# Patient Record
Sex: MALE | Race: BLACK OR AFRICAN AMERICAN | NOT HISPANIC OR LATINO | Employment: FULL TIME | URBAN - METROPOLITAN AREA
[De-identification: names, ages, dates, MRNs, and addresses within clinical notes are randomized per-mention and may not be internally consistent; named-entity substitution may affect disease eponyms.]

---

## 2018-12-19 ENCOUNTER — OUTSIDE PLACE OF SERVICE (OUTPATIENT)
Dept: CARDIOLOGY | Facility: CLINIC | Age: 48
End: 2018-12-19
Payer: MEDICAID

## 2018-12-19 PROCEDURE — 93306 TTE W/DOPPLER COMPLETE: CPT | Mod: 26,S$PBB,, | Performed by: STUDENT IN AN ORGANIZED HEALTH CARE EDUCATION/TRAINING PROGRAM

## 2020-02-07 ENCOUNTER — CLINICAL SUPPORT (OUTPATIENT)
Dept: CARDIOLOGY | Facility: CLINIC | Age: 50
End: 2020-02-07
Attending: INTERNAL MEDICINE
Payer: MEDICARE

## 2020-02-07 ENCOUNTER — HOSPITAL ENCOUNTER (OUTPATIENT)
Dept: TELEMEDICINE | Facility: HOSPITAL | Age: 50
Discharge: HOME | End: 2020-02-07
Payer: MEDICARE

## 2020-02-07 ENCOUNTER — OUTSIDE PLACE OF SERVICE (OUTPATIENT)
Dept: CARDIOLOGY | Facility: CLINIC | Age: 50
End: 2020-02-07
Payer: MEDICARE

## 2020-02-07 DIAGNOSIS — I63.9 CEREBROVASCULAR ACCIDENT (CVA), UNSPECIFIED MECHANISM: ICD-10-CM

## 2020-02-07 DIAGNOSIS — G45.9 TIA (TRANSIENT ISCHEMIC ATTACK): ICD-10-CM

## 2020-02-07 LAB
AORTIC VALVE CUSP SEPERATION: 2 CM
ASCENDING AORTA: 2.8 CM
AV INDEX (PROSTH): 0.67
AV MEAN GRADIENT: 4 MMHG
AV PEAK GRADIENT: 7 MMHG
AV REGURGITATION PRESSURE HALF TIME: 668 MS
AV VALVE AREA: 2.32 CM2
AV VELOCITY RATIO: 0.69
CV ECHO LV RWT: 0.41 CM
DOP CALC AO PEAK VEL: 1.3 M/S
DOP CALC AO VTI: 28.4 CM
DOP CALC LVOT AREA: 3.5 CM2
DOP CALC LVOT DIAMETER: 2.1 CM
DOP CALC LVOT PEAK VEL: 0.9 M/S
DOP CALC LVOT STROKE VOLUME: 65.78 CM3
DOP CALC MV VTI: 20 CM
DOP CALCLVOT PEAK VEL VTI: 19 CM
E WAVE DECELERATION TIME: 186 MSEC
E/A RATIO: 1.12
E/E' RATIO: 10 M/S
ECHO LV POSTERIOR WALL: 1.2 CM (ref 0.6–1.1)
FRACTIONAL SHORTENING: 22 % (ref 28–44)
INTERVENTRICULAR SEPTUM: 1.6 CM (ref 0.6–1.1)
IVC PROX: 1.5 CM
IVRT: 166 MSEC
LEFT INTERNAL DIMENSION IN SYSTOLE: 4.6 CM (ref 2.1–4)
LEFT VENTRICULAR INTERNAL DIMENSION IN DIASTOLE: 5.9 CM (ref 3.5–6)
LEFT VENTRICULAR MASS: 377.6 G
LV LATERAL E/E' RATIO: 11 M/S
LV SEPTAL E/E' RATIO: 9.17 M/S
MV A" WAVE DURATION": 125 MSEC
MV MEAN GRADIENT: 0 MMHG
MV PEAK A VEL: 0.49 M/S
MV PEAK E VEL: 0.55 M/S
MV PEAK GRADIENT: 1 MMHG
MV STENOSIS PRESSURE HALF TIME: 60 MS
MV VALVE AREA BY CONTINUITY EQUATION: 3.29 CM2
MV VALVE AREA P 1/2 METHOD: 3.67 CM2
PISA TR MAX VEL: 2.72 M/S
PROX AORTA: 2.3 CM
PULM VEIN A" WAVE DURATION": 129 MSEC
PULM VEIN S/D RATIO: 1.39
PV PEAK D VEL: 0.41 M/S
PV PEAK S VEL: 0.57 M/S
PV PEAK VELOCITY: 0.73 CM/S
RA PRESSURE: 3 MMHG
RIGHT VENTRICULAR END-DIASTOLIC DIMENSION: 2.9 CM
SINUS: 3.4 CM
STJ: 2.8 CM
TDI LATERAL: 0.05 M/S
TDI SEPTAL: 0.06 M/S
TDI: 0.06 M/S
TR MAX PG: 30 MMHG
TRICUSPID ANNULAR PLANE SYSTOLIC EXCURSION: 2.3 CM
TV REST PULMONARY ARTERY PRESSURE: 33 MMHG

## 2020-02-07 PROCEDURE — G0425 INPT/ED TELECONSULT30: HCPCS | Mod: GT,G0,, | Performed by: PSYCHIATRY & NEUROLOGY

## 2020-02-07 PROCEDURE — 93321 PR DOPPLER ECHO HEART,LIMITED,F/U: ICD-10-PCS | Mod: 26,,, | Performed by: INTERNAL MEDICINE

## 2020-02-07 PROCEDURE — G0425 PR INPT TELEHEALTH CONSULT 30M: ICD-10-PCS | Mod: GT,G0,, | Performed by: PSYCHIATRY & NEUROLOGY

## 2020-02-07 PROCEDURE — 93325 PR DOPPLER COLOR FLOW VELOCITY MAP: ICD-10-PCS | Mod: 26,,, | Performed by: INTERNAL MEDICINE

## 2020-02-07 PROCEDURE — 93308 ECHO (CUPID ONLY): ICD-10-PCS | Mod: 26,,, | Performed by: INTERNAL MEDICINE

## 2020-02-07 PROCEDURE — 93010 PR ELECTROCARDIOGRAM REPORT: ICD-10-PCS | Mod: S$GLB,,, | Performed by: STUDENT IN AN ORGANIZED HEALTH CARE EDUCATION/TRAINING PROGRAM

## 2020-02-07 PROCEDURE — 93325 DOPPLER ECHO COLOR FLOW MAPG: CPT | Mod: 26,,, | Performed by: INTERNAL MEDICINE

## 2020-02-07 PROCEDURE — 93308 TTE F-UP OR LMTD: CPT | Mod: 26,,, | Performed by: INTERNAL MEDICINE

## 2020-02-07 PROCEDURE — 93321 DOPPLER ECHO F-UP/LMTD STD: CPT | Mod: 26,,, | Performed by: INTERNAL MEDICINE

## 2020-02-07 PROCEDURE — 93010 ELECTROCARDIOGRAM REPORT: CPT | Mod: S$GLB,,, | Performed by: STUDENT IN AN ORGANIZED HEALTH CARE EDUCATION/TRAINING PROGRAM

## 2020-02-07 NOTE — CONSULTS
[facility]  Vascular Neurology  Comprehensive Stroke Center  Tele-Consultation Note      Consults    Consulting Provider: HELIO COE  Current Providers  No providers found    Patient Location: [facility] - TELEMEDIC[facility] Emergency Department  Spoke hospital nurse at bedside with patient assisting consultant.     Patient information was obtained from patient and ED nurse.         Assessment/Plan:   48 y/o with HTN, DM, presents with acute onset L sided weakness-numbness and slurred speech noticed upon awakening. Had similar symptoms yesterday which lasted 1 hour and resolved.  NIHSS 6, CTH without acute abnormality.  Suspect acute R brain infarct. No iv alteplase due to woke up stroke.  Recommend STAT CTA brain and neck searching for LVO and eligibility for EVT.  Transfer to main campus if CTA confirms LVO. Otherwise, patient can stay at the spoken facility or transfer to the nearest appropriate facility for completion of stroke work up.           STROKE DOCUMENTATION     Acute Stroke Times:   Acute Stroke Times   Last Known Normal Date: 02/07/20  Last Known Normal Time: 1900  Symptom Onset Date: (unclear, woke up with symptoms)  Symptom Onset Time: (unclear, woke up stroke)  Stroke Team Called Date: 02/07/20  Stroke Team Called Time: 0705  Stroke Team Arrival Date: 02/07/20  Stroke Team Arrival Time: 0710  CT Interpretation Time: 0710  Decision to Treat Time for Alteplase: (No iv alteplase)  Decision to Treat Time for IR: (Pending cta brain)    NIH Scale:  Interval: baseline  1a. Level of Consciousness: 0-->Alert, keenly responsive  1b. LOC Questions: 0-->Answers both questions correctly  1c. LOC Commands: 0-->Performs both tasks correctly  2. Best Gaze: 0-->Normal  3. Visual: 0-->No visual loss  4. Facial Palsy: 0-->Normal symmetrical movements  5a. Motor Arm, Left: 1-->Drift, limb holds 90 (or 45) degrees, but drifts down before full  10 seconds, does not hit bed or other support  5b. Motor Arm, Right: 0-->No drift, limb holds 90 (or 45) degrees for full 10 secs  6a. Motor Leg, Left: 2-->Some effort against gravity, leg falls to bed by 5 secs, but has some effort against gravity  6b. Motor Leg, Right: 0-->No drift, leg holds 30 degree position for full 5 secs  7. Limb Ataxia: 0-->Absent  8. Sensory: 1-->Mild-to-moderate sensory loss, patient feels pinprick is less sharp or is dull on the affected side, or there is a loss of superficial pain with pinprick, but patient is aware of being touched  9. Best Language: 0-->No aphasia, normal  10. Dysarthria: 2-->Severe dysarthria, patients speech is so slurred as to be unintelligible in the absence of or out of proportion to any dysphasia, or is mute/anarthric  11. Extinction and Inattention (formerly Neglect): 0-->No abnormality  Total (NIH Stroke Scale): 6     Modified Rios Score: 0  Nalini Coma Scale:15   ABCD2 Score:    [id number] Score:   HAS -BLED Score:   ICH Score:   Hunt & Eaton Classification:       Diagnoses: L sided weakness. Slurred speech.  No new Assessment & Plan notes have been filed under this hospital service since the last note was generated.  Service: Vascular Neurology      There were no vitals taken for this visit.  Alteplase Eligible?: No  Alteplase Recommendation: Alteplase not recommended due to Outside of treatment window   Possible Interventional Revascularization Candidate? pending CTA brain and neck    Disposition Recommendation: pending further studies    Subjective:     History of Present Illness: 50 y/o with HTN, DM, presents with acute onset L sided weakness-numbness and slurred speech noticed upon awakening. Had similar symptoms yesterday which lasted 1 hour and resolved.  No improving.         No notes on file      Woke up with symptoms?: yes    Recent bleeding noted: no  Does the patient take any Blood Thinners? no  Medications: Antiplatelets:  none      Past  Medical History: hypertension and diabetes    Past Surgical History: no major surgeries within the last 2 weeks    Family History: no relevant history    Social History: no smoking, no drinking, no drugs    Allergies: No Known Allergies No known drug allergies    Review of Systems   Constitutional: Negative for chills, diaphoresis and fever.   HENT: Negative for hearing loss, trouble swallowing and voice change.    Eyes: Negative for visual disturbance.   Respiratory: Negative for shortness of breath.    Cardiovascular: Negative for chest pain and palpitations.   Gastrointestinal: Negative for vomiting.   Endocrine: Negative for cold intolerance.   Genitourinary: Negative for hematuria.   Musculoskeletal: Negative for gait problem, neck pain and neck stiffness.   Skin: Negative for rash.   Allergic/Immunologic: Negative for immunocompromised state.   Neurological: Positive for speech difficulty, weakness and numbness. Negative for dizziness, facial asymmetry and headaches.   Hematological: Does not bruise/bleed easily.   Psychiatric/Behavioral: Negative for agitation, behavioral problems and confusion.     Objective:   Vitals: There were no vitals taken for this visit. BP: 152/93, Respiratory Rate: 17 and Heart Rate: 78    CT READ: Yes  No hemmorhage. No mass effect. No early infarct signs.     Physical Exam   Constitutional: He is oriented to person, place, and time. He appears well-developed and well-nourished.   HENT:   Head: Normocephalic and atraumatic.   Eyes: Pupils are equal, round, and reactive to light. EOM are normal.   Neck: Normal range of motion. Neck supple.   Cardiovascular: Normal rate and regular rhythm.   Pulmonary/Chest: No respiratory distress.   Abdominal: He exhibits no mass.   Genitourinary:   Genitourinary Comments: No performed   Musculoskeletal: He exhibits no edema or deformity.   Neurological: He is alert and oriented to person, place, and time. A sensory deficit is present. No cranial  nerve deficit. Coordination normal.   Skin: No rash noted. He is not diaphoretic. No erythema.   Psychiatric: He has a normal mood and affect. His behavior is normal.   Nursing note and vitals reviewed.            Recommended the emergency room physician to have a brief discussion with the patient and/or family if available regarding the risks and benefits of treatment, and to briefly document the occurrence of that discussion in his clinical encounter note.     The attending portion of this evaluation, treatment, and documentation was performed per Dario Jordan MD via audiovisual.    Billing code:  (moderate to severe stroke, large areas of edema, some mimics)    · This patient has a critical neurological condition/illness, with high morbidity and mortality.  · There is a high probability for acute neurological change leading to clinical and possibly life-threatening deterioration requiring highest level of physician preparedness for urgent intervention.  · Care was coordinated with other physicians involved in the patient's care.  · Radiologic studies and laboratory data were reviewed and interpreted, and plan of care was re-assessed based on the results.  · Diagnosis, treatment options and prognosis may have been discussed with the patient and/or family members or caregiver.  · Further advanced medical management and further evaluation is warranted for his care.      In your opinion, this was a: Tier 1 Van Negative    Consult End Time: 717 am    Dario Jordan MD  Comprehensive Stroke Center  Vascular Neurology   [facility]

## 2020-02-07 NOTE — SUBJECTIVE & OBJECTIVE
Woke up with symptoms?: yes    Recent bleeding noted: no  Does the patient take any Blood Thinners? no  Medications: Antiplatelets:  none      Past Medical History: hypertension and diabetes    Past Surgical History: no major surgeries within the last 2 weeks    Family History: no relevant history    Social History: no smoking, no drinking, no drugs    Allergies: No Known Allergies No known drug allergies    Review of Systems   Constitutional: Negative for chills, diaphoresis and fever.   HENT: Negative for hearing loss, trouble swallowing and voice change.    Eyes: Negative for visual disturbance.   Respiratory: Negative for shortness of breath.    Cardiovascular: Negative for chest pain and palpitations.   Gastrointestinal: Negative for vomiting.   Endocrine: Negative for cold intolerance.   Genitourinary: Negative for hematuria.   Musculoskeletal: Negative for gait problem, neck pain and neck stiffness.   Skin: Negative for rash.   Allergic/Immunologic: Negative for immunocompromised state.   Neurological: Positive for speech difficulty, weakness and numbness. Negative for dizziness, facial asymmetry and headaches.   Hematological: Does not bruise/bleed easily.   Psychiatric/Behavioral: Negative for agitation, behavioral problems and confusion.     Objective:   Vitals: There were no vitals taken for this visit. BP: 152/93, Respiratory Rate: 17 and Heart Rate: 78    CT READ: Yes  No hemmorhage. No mass effect. No early infarct signs.     Physical Exam   Constitutional: He is oriented to person, place, and time. He appears well-developed and well-nourished.   HENT:   Head: Normocephalic and atraumatic.   Eyes: Pupils are equal, round, and reactive to light. EOM are normal.   Neck: Normal range of motion. Neck supple.   Cardiovascular: Normal rate and regular rhythm.   Pulmonary/Chest: No respiratory distress.   Abdominal: He exhibits no mass.   Genitourinary:   Genitourinary Comments: No performed    Musculoskeletal: He exhibits no edema or deformity.   Neurological: He is alert and oriented to person, place, and time. A sensory deficit is present. No cranial nerve deficit. Coordination normal.   Skin: No rash noted. He is not diaphoretic. No erythema.   Psychiatric: He has a normal mood and affect. His behavior is normal.   Nursing note and vitals reviewed.

## 2020-03-11 ENCOUNTER — OUTSIDE PLACE OF SERVICE (OUTPATIENT)
Dept: CARDIOLOGY | Facility: CLINIC | Age: 50
End: 2020-03-11
Payer: MEDICARE

## 2020-03-11 PROCEDURE — 93010 ELECTROCARDIOGRAM REPORT: CPT | Mod: ,,, | Performed by: INTERNAL MEDICINE

## 2020-03-11 PROCEDURE — 93010 PR ELECTROCARDIOGRAM REPORT: ICD-10-PCS | Mod: ,,, | Performed by: INTERNAL MEDICINE

## 2020-08-06 ENCOUNTER — OUTSIDE PLACE OF SERVICE (OUTPATIENT)
Dept: CARDIOLOGY | Facility: CLINIC | Age: 50
End: 2020-08-06
Payer: MEDICARE

## 2020-08-06 PROCEDURE — 93010 ELECTROCARDIOGRAM REPORT: CPT | Mod: ,,, | Performed by: INTERNAL MEDICINE

## 2020-08-06 PROCEDURE — 93010 PR ELECTROCARDIOGRAM REPORT: ICD-10-PCS | Mod: ,,, | Performed by: INTERNAL MEDICINE

## 2022-09-12 ENCOUNTER — CLINICAL SUPPORT (OUTPATIENT)
Dept: CARDIOLOGY | Facility: CLINIC | Age: 52
End: 2022-09-12
Attending: INTERNAL MEDICINE
Payer: MEDICARE

## 2022-09-12 ENCOUNTER — DOCUMENTATION ONLY (OUTPATIENT)
Dept: HEPATOLOGY | Facility: HOSPITAL | Age: 52
End: 2022-09-12
Payer: MEDICARE

## 2022-09-12 ENCOUNTER — OUTSIDE PLACE OF SERVICE (OUTPATIENT)
Dept: CARDIOLOGY | Facility: CLINIC | Age: 52
End: 2022-09-12
Payer: MEDICARE

## 2022-09-12 VITALS — WEIGHT: 212.06 LBS | BODY MASS INDEX: 27.22 KG/M2 | HEIGHT: 74 IN

## 2022-09-12 DIAGNOSIS — D72.829 LEUKOCYTOSIS, UNSPECIFIED TYPE: ICD-10-CM

## 2022-09-12 LAB
AORTIC ROOT ANNULUS: 2.77 CM
AORTIC VALVE CUSP SEPERATION: 2 CM
ASCENDING AORTA: 2.83 CM
AV INDEX (PROSTH): 0.65
AV MEAN GRADIENT: 5 MMHG
AV PEAK GRADIENT: 9 MMHG
AV VALVE AREA: 3.02 CM2
AV VELOCITY RATIO: 0.73
BSA FOR ECHO PROCEDURE: 2.24 M2
CV ECHO LV RWT: 0.37 CM
DOP CALC AO PEAK VEL: 1.46 M/S
DOP CALC AO VTI: 26.9 CM
DOP CALC LVOT AREA: 4.7 CM2
DOP CALC LVOT DIAMETER: 2.44 CM
DOP CALC LVOT PEAK VEL: 1.07 M/S
DOP CALC LVOT STROKE VOLUME: 81.32 CM3
DOP CALC MV VTI: 14.9 CM
DOP CALCLVOT PEAK VEL VTI: 17.4 CM
E WAVE DECELERATION TIME: 171.18 MSEC
E/A RATIO: 1.04
E/E' RATIO: 12 M/S
ECHO LV POSTERIOR WALL: 1 CM (ref 0.6–1.1)
EJECTION FRACTION: 65 %
FRACTIONAL SHORTENING: 30 % (ref 28–44)
INTERVENTRICULAR SEPTUM: 1.45 CM (ref 0.6–1.1)
IVC DIAMETER: 1.13 CM
IVRT: 134.95 MSEC
LA MAJOR: 5.79 CM
LA MINOR: 4.47 CM
LEFT INTERNAL DIMENSION IN SYSTOLE: 3.77 CM (ref 2.1–4)
LEFT VENTRICLE DIASTOLIC VOLUME INDEX: 63.95 ML/M2
LEFT VENTRICLE DIASTOLIC VOLUME: 142.61 ML
LEFT VENTRICLE MASS INDEX: 123 G/M2
LEFT VENTRICLE SYSTOLIC VOLUME INDEX: 27.2 ML/M2
LEFT VENTRICLE SYSTOLIC VOLUME: 60.65 ML
LEFT VENTRICULAR INTERNAL DIMENSION IN DIASTOLE: 5.42 CM (ref 3.5–6)
LEFT VENTRICULAR MASS: 273.68 G
LV LATERAL E/E' RATIO: 12 M/S
LV SEPTAL E/E' RATIO: 12 M/S
LVOT MG: 2.07 MMHG
LVOT MV: 0.63 CM/S
MV A" WAVE DURATION": 114.19 MSEC
MV MEAN GRADIENT: 1 MMHG
MV PEAK A VEL: 0.69 M/S
MV PEAK E VEL: 0.72 M/S
MV PEAK GRADIENT: 2 MMHG
MV VALVE AREA BY CONTINUITY EQUATION: 5.46 CM2
PULM VEIN S/D RATIO: 1.61
PV PEAK D VEL: 0.46 M/S
PV PEAK S VEL: 0.74 M/S
PV PEAK VELOCITY: 1.04 CM/S
RA MAJOR: 4.83 CM
RA PRESSURE: 3 MMHG
RIGHT VENTRICULAR END-DIASTOLIC DIMENSION: 3.1 CM
RV TISSUE DOPPLER FREE WALL SYSTOLIC VELOCITY 1 (APICAL 4 CHAMBER VIEW): 0.02 CM/S
TDI LATERAL: 0.06 M/S
TDI SEPTAL: 0.06 M/S
TDI: 0.06 M/S
TV PEAK GRADIENT: 1.54 MMHG

## 2022-09-12 PROCEDURE — 93306 ECHO (CUPID ONLY): ICD-10-PCS | Mod: 26,,, | Performed by: INTERNAL MEDICINE

## 2022-09-12 PROCEDURE — 93306 TTE W/DOPPLER COMPLETE: CPT | Mod: 26,,, | Performed by: INTERNAL MEDICINE

## 2022-09-13 NOTE — PROGRESS NOTES
Outside Transfer Acceptance Note / Regional Referral Center    Referring facility: [facility]   Referring provider: TANA TAVERA  Accepting facility: [facility]  Accepting provider: Ekaterina Sellers MD  Admitting provider: Ekaterina Sellers MD  Reason for transfer:  Diabetic foot ulcer with abscess  Transfer diagnosis: Diabetic foot ulcer with abscess  Transfer specialty requested: Podiatry, ID  Transfer specialty notified: yes  Transfer level: NUMBER 1-5: 2  Bed type requested: Med Surg  Admission class or status: IP- Inpatient      Narrative     Transfer Diagnosis:  Diabetic foot ulcer with abscess  Reason for Transfer:  Diabetic foot ulcer with abscess  Consultants:  Podiatry, ID  Transferring Facility:  [facility]  Transferring Destination:     Mr. Henry Fernandez is a 52 y.o. male with poorly controlled T2DM, diabetic gastroparesis, history of CVA with left sided hemiparesis, and a known right diabetic foot ulcer (follows with wound care), who [facility] [place] [facility] for evaluation of nausea, vomiting, and diarrhea for 3 days.  He was initially admitted for diabetic gastroparesis.  Initial workup showed an elevated Lactic 3 that improved with IVF.  Throat Strep screen was positive and he was given IM Penicillin.  Wound care was consulted for his diabetic foot ulcer, who were concerned about an infection.  He was started on Vanc and Ceftriaxone, and then changed to Zosyn as his WBC increased from 17 to 21.  ESR >140,  Blood cultures returned positive for Staph schleiferi.  MRI of the RLE showed an 8.4cm dorsal fluid collection, with a possibilty of osteo.  Transfer is requested for a HLOC, for Podiatry and ID evaluation.    Consult Podiatry  Consult ID  Repeat cultures      Instructions      [facility]  Admit to Hospital Medicine  Upon patient arrival to floor, please contact Hospital Medicine on call.     Ekaterina Sellers MD, [facility]  Senior  Physician  [facility] Medicine

## 2022-09-14 ENCOUNTER — HOSPITAL ENCOUNTER (INPATIENT)
Facility: HOSPITAL | Age: 52
LOS: 9 days | Discharge: NURSING, REHAB OR LONG-TERM CARE | DRG: 623 | End: 2022-09-23
Attending: EMERGENCY MEDICINE | Admitting: INTERNAL MEDICINE
Payer: MEDICARE

## 2022-09-14 DIAGNOSIS — E11.621 DIABETIC FOOT ULCER: ICD-10-CM

## 2022-09-14 DIAGNOSIS — L97.509 DIABETIC FOOT ULCER: ICD-10-CM

## 2022-09-14 DIAGNOSIS — E11.621 DIABETIC ULCER OF RIGHT MIDFOOT ASSOCIATED WITH TYPE 2 DIABETES MELLITUS, WITH NECROSIS OF MUSCLE: ICD-10-CM

## 2022-09-14 DIAGNOSIS — L97.413 DIABETIC ULCER OF RIGHT MIDFOOT ASSOCIATED WITH TYPE 2 DIABETES MELLITUS, WITH NECROSIS OF MUSCLE: ICD-10-CM

## 2022-09-14 DIAGNOSIS — E11.621 DIABETIC ULCER OF RIGHT FOOT ASSOCIATED WITH TYPE 2 DIABETES MELLITUS, WITH BONE INVOLVEMENT WITHOUT EVIDENCE OF NECROSIS, UNSPECIFIED PART OF FOOT: Primary | ICD-10-CM

## 2022-09-14 DIAGNOSIS — L97.519 DIABETIC ULCER OF OTHER PART OF RIGHT FOOT ASSOCIATED WITH DIABETES MELLITUS DUE TO UNDERLYING CONDITION, UNSPECIFIED ULCER STAGE: ICD-10-CM

## 2022-09-14 DIAGNOSIS — M14.671 CHARCOT'S JOINT OF FOOT, RIGHT: ICD-10-CM

## 2022-09-14 DIAGNOSIS — E08.621 DIABETIC ULCER OF OTHER PART OF RIGHT FOOT ASSOCIATED WITH DIABETES MELLITUS DUE TO UNDERLYING CONDITION, UNSPECIFIED ULCER STAGE: ICD-10-CM

## 2022-09-14 DIAGNOSIS — L97.516 DIABETIC ULCER OF RIGHT FOOT ASSOCIATED WITH TYPE 2 DIABETES MELLITUS, WITH BONE INVOLVEMENT WITHOUT EVIDENCE OF NECROSIS, UNSPECIFIED PART OF FOOT: Primary | ICD-10-CM

## 2022-09-14 DIAGNOSIS — L02.611 ABSCESS OF FOOT WITHOUT TOES, RIGHT: ICD-10-CM

## 2022-09-14 DIAGNOSIS — R07.9 CHEST PAIN: ICD-10-CM

## 2022-09-14 PROBLEM — Z86.73 HISTORY OF STROKE: Status: ACTIVE | Noted: 2022-09-14

## 2022-09-14 PROBLEM — R78.81 BACTEREMIA: Status: ACTIVE | Noted: 2022-09-14

## 2022-09-14 LAB
ALBUMIN SERPL BCP-MCNC: 1.9 G/DL (ref 3.5–5.2)
ALP SERPL-CCNC: 98 U/L (ref 55–135)
ALT SERPL W/O P-5'-P-CCNC: 18 U/L (ref 10–44)
ANION GAP SERPL CALC-SCNC: 12 MMOL/L (ref 8–16)
APTT BLDCRRT: 27.9 SEC (ref 21–32)
AST SERPL-CCNC: 22 U/L (ref 10–40)
BASOPHILS NFR BLD: 0 % (ref 0–1.9)
BILIRUB SERPL-MCNC: 0.8 MG/DL (ref 0.1–1)
BUN SERPL-MCNC: 16 MG/DL (ref 6–20)
CALCIUM SERPL-MCNC: 8.3 MG/DL (ref 8.7–10.5)
CHLORIDE SERPL-SCNC: 100 MMOL/L (ref 95–110)
CO2 SERPL-SCNC: 21 MMOL/L (ref 23–29)
CREAT SERPL-MCNC: 1.7 MG/DL (ref 0.5–1.4)
CRP SERPL-MCNC: 204.6 MG/L (ref 0–8.2)
DIFFERENTIAL METHOD: ABNORMAL
EOSINOPHIL NFR BLD: 3 % (ref 0–8)
ERYTHROCYTE [DISTWIDTH] IN BLOOD BY AUTOMATED COUNT: 13.1 % (ref 11.5–14.5)
ERYTHROCYTE [SEDIMENTATION RATE] IN BLOOD BY WESTERGREN METHOD: 120 MM/HR (ref 0–10)
EST. GFR  (NO RACE VARIABLE): 48 ML/MIN/1.73 M^2
GLUCOSE SERPL-MCNC: 256 MG/DL (ref 70–110)
HCT VFR BLD AUTO: 32.5 % (ref 40–54)
HGB BLD-MCNC: 10.6 G/DL (ref 14–18)
IMM GRANULOCYTES # BLD AUTO: ABNORMAL K/UL (ref 0–0.04)
IMM GRANULOCYTES NFR BLD AUTO: ABNORMAL % (ref 0–0.5)
INR PPP: 1 (ref 0.8–1.2)
LACTATE SERPL-SCNC: 1.2 MMOL/L (ref 0.5–2.2)
LYMPHOCYTES NFR BLD: 9 % (ref 18–48)
MCH RBC QN AUTO: 29.1 PG (ref 27–31)
MCHC RBC AUTO-ENTMCNC: 32.6 G/DL (ref 32–36)
MCV RBC AUTO: 89 FL (ref 82–98)
MONOCYTES NFR BLD: 12 % (ref 4–15)
NEUTROPHILS NFR BLD: 76 % (ref 38–73)
NRBC BLD-RTO: 0 /100 WBC
PLATELET # BLD AUTO: 429 K/UL (ref 150–450)
PLATELET BLD QL SMEAR: ABNORMAL
PMV BLD AUTO: 8.7 FL (ref 9.2–12.9)
POCT GLUCOSE: 243 MG/DL (ref 70–110)
POCT GLUCOSE: 303 MG/DL (ref 70–110)
POTASSIUM SERPL-SCNC: 3.7 MMOL/L (ref 3.5–5.1)
PROCALCITONIN SERPL IA-MCNC: 0.65 NG/ML
PROT SERPL-MCNC: 6.9 G/DL (ref 6–8.4)
PROTHROMBIN TIME: 10.8 SEC (ref 9–12.5)
RBC # BLD AUTO: 3.64 M/UL (ref 4.6–6.2)
SODIUM SERPL-SCNC: 133 MMOL/L (ref 136–145)
WBC # BLD AUTO: 21.51 K/UL (ref 3.9–12.7)

## 2022-09-14 PROCEDURE — 10060 I&D ABSCESS SIMPLE/SINGLE: CPT | Mod: ,,, | Performed by: PODIATRIST

## 2022-09-14 PROCEDURE — 36415 COLL VENOUS BLD VENIPUNCTURE: CPT | Performed by: NURSE PRACTITIONER

## 2022-09-14 PROCEDURE — 86140 C-REACTIVE PROTEIN: CPT | Performed by: NURSE PRACTITIONER

## 2022-09-14 PROCEDURE — 85730 THROMBOPLASTIN TIME PARTIAL: CPT | Performed by: NURSE PRACTITIONER

## 2022-09-14 PROCEDURE — 21400001 HC TELEMETRY ROOM

## 2022-09-14 PROCEDURE — 83036 HEMOGLOBIN GLYCOSYLATED A1C: CPT | Performed by: NURSE PRACTITIONER

## 2022-09-14 PROCEDURE — 83605 ASSAY OF LACTIC ACID: CPT | Performed by: NURSE PRACTITIONER

## 2022-09-14 PROCEDURE — 63600175 PHARM REV CODE 636 W HCPCS: Performed by: NURSE PRACTITIONER

## 2022-09-14 PROCEDURE — 84145 PROCALCITONIN (PCT): CPT | Performed by: NURSE PRACTITIONER

## 2022-09-14 PROCEDURE — 10060 INCISION AND DRAINAGE: ICD-10-PCS | Mod: ,,, | Performed by: PODIATRIST

## 2022-09-14 PROCEDURE — 87081 CULTURE SCREEN ONLY: CPT | Performed by: INTERNAL MEDICINE

## 2022-09-14 PROCEDURE — 87040 BLOOD CULTURE FOR BACTERIA: CPT | Mod: 59 | Performed by: INTERNAL MEDICINE

## 2022-09-14 PROCEDURE — 99223 PR INITIAL HOSPITAL CARE,LEVL III: ICD-10-PCS | Mod: 25,,, | Performed by: PODIATRIST

## 2022-09-14 PROCEDURE — 99223 1ST HOSP IP/OBS HIGH 75: CPT | Mod: 25,,, | Performed by: PODIATRIST

## 2022-09-14 PROCEDURE — 25000003 PHARM REV CODE 250: Performed by: NURSE PRACTITIONER

## 2022-09-14 PROCEDURE — 25000003 PHARM REV CODE 250: Performed by: INTERNAL MEDICINE

## 2022-09-14 PROCEDURE — 80053 COMPREHEN METABOLIC PANEL: CPT | Performed by: NURSE PRACTITIONER

## 2022-09-14 PROCEDURE — 85027 COMPLETE CBC AUTOMATED: CPT | Performed by: NURSE PRACTITIONER

## 2022-09-14 PROCEDURE — 85007 BL SMEAR W/DIFF WBC COUNT: CPT | Performed by: NURSE PRACTITIONER

## 2022-09-14 PROCEDURE — 85651 RBC SED RATE NONAUTOMATED: CPT | Performed by: NURSE PRACTITIONER

## 2022-09-14 PROCEDURE — 63600175 PHARM REV CODE 636 W HCPCS: Performed by: INTERNAL MEDICINE

## 2022-09-14 PROCEDURE — 85610 PROTHROMBIN TIME: CPT | Performed by: NURSE PRACTITIONER

## 2022-09-14 PROCEDURE — 36415 COLL VENOUS BLD VENIPUNCTURE: CPT | Performed by: INTERNAL MEDICINE

## 2022-09-14 RX ORDER — IBUPROFEN 200 MG
24 TABLET ORAL
Status: DISCONTINUED | OUTPATIENT
Start: 2022-09-14 | End: 2022-09-23 | Stop reason: HOSPADM

## 2022-09-14 RX ORDER — AMLODIPINE BESYLATE 10 MG/1
10 TABLET ORAL DAILY
COMMUNITY
Start: 2022-06-08

## 2022-09-14 RX ORDER — ACETAMINOPHEN 325 MG/1
650 TABLET ORAL EVERY 4 HOURS PRN
Status: DISCONTINUED | OUTPATIENT
Start: 2022-09-14 | End: 2022-09-23 | Stop reason: HOSPADM

## 2022-09-14 RX ORDER — INSULIN ASPART 100 [IU]/ML
25 INJECTION, SOLUTION INTRAVENOUS; SUBCUTANEOUS
Status: ON HOLD | COMMUNITY
End: 2022-09-23 | Stop reason: HOSPADM

## 2022-09-14 RX ORDER — MAG HYDROX/ALUMINUM HYD/SIMETH 200-200-20
30 SUSPENSION, ORAL (FINAL DOSE FORM) ORAL 4 TIMES DAILY PRN
Status: DISCONTINUED | OUTPATIENT
Start: 2022-09-14 | End: 2022-09-23 | Stop reason: HOSPADM

## 2022-09-14 RX ORDER — SIMETHICONE 80 MG
1 TABLET,CHEWABLE ORAL 4 TIMES DAILY PRN
Status: DISCONTINUED | OUTPATIENT
Start: 2022-09-14 | End: 2022-09-23 | Stop reason: HOSPADM

## 2022-09-14 RX ORDER — POLYETHYLENE GLYCOL 3350 17 G/17G
17 POWDER, FOR SOLUTION ORAL DAILY PRN
Status: DISCONTINUED | OUTPATIENT
Start: 2022-09-14 | End: 2022-09-23 | Stop reason: HOSPADM

## 2022-09-14 RX ORDER — ATORVASTATIN CALCIUM 20 MG/1
TABLET, FILM COATED ORAL
COMMUNITY

## 2022-09-14 RX ORDER — NALOXONE HCL 0.4 MG/ML
0.02 VIAL (ML) INJECTION
Status: DISCONTINUED | OUTPATIENT
Start: 2022-09-14 | End: 2022-09-23 | Stop reason: HOSPADM

## 2022-09-14 RX ORDER — AMLODIPINE BESYLATE 10 MG/1
10 TABLET ORAL DAILY
Status: DISCONTINUED | OUTPATIENT
Start: 2022-09-15 | End: 2022-09-14

## 2022-09-14 RX ORDER — GLUCAGON 1 MG
1 KIT INJECTION
Status: DISCONTINUED | OUTPATIENT
Start: 2022-09-14 | End: 2022-09-23 | Stop reason: HOSPADM

## 2022-09-14 RX ORDER — CLOPIDOGREL BISULFATE 75 MG/1
TABLET ORAL
COMMUNITY

## 2022-09-14 RX ORDER — IBUPROFEN 200 MG
16 TABLET ORAL
Status: DISCONTINUED | OUTPATIENT
Start: 2022-09-14 | End: 2022-09-23 | Stop reason: HOSPADM

## 2022-09-14 RX ORDER — ATORVASTATIN CALCIUM 10 MG/1
20 TABLET, FILM COATED ORAL DAILY
Status: DISCONTINUED | OUTPATIENT
Start: 2022-09-15 | End: 2022-09-23 | Stop reason: HOSPADM

## 2022-09-14 RX ORDER — IPRATROPIUM BROMIDE AND ALBUTEROL SULFATE 2.5; .5 MG/3ML; MG/3ML
3 SOLUTION RESPIRATORY (INHALATION) EVERY 6 HOURS PRN
Status: DISCONTINUED | OUTPATIENT
Start: 2022-09-14 | End: 2022-09-23 | Stop reason: HOSPADM

## 2022-09-14 RX ORDER — INSULIN LISPRO 100 [IU]/ML
25 INJECTION, SOLUTION INTRAVENOUS; SUBCUTANEOUS
Status: ON HOLD | COMMUNITY
End: 2022-09-23 | Stop reason: HOSPADM

## 2022-09-14 RX ORDER — SODIUM CHLORIDE 0.9 % (FLUSH) 0.9 %
10 SYRINGE (ML) INJECTION EVERY 8 HOURS PRN
Status: DISCONTINUED | OUTPATIENT
Start: 2022-09-14 | End: 2022-09-23 | Stop reason: HOSPADM

## 2022-09-14 RX ORDER — ONDANSETRON 2 MG/ML
4 INJECTION INTRAMUSCULAR; INTRAVENOUS EVERY 8 HOURS PRN
Status: DISCONTINUED | OUTPATIENT
Start: 2022-09-14 | End: 2022-09-23 | Stop reason: HOSPADM

## 2022-09-14 RX ORDER — INSULIN ASPART 100 [IU]/ML
1-10 INJECTION, SOLUTION INTRAVENOUS; SUBCUTANEOUS
Status: DISCONTINUED | OUTPATIENT
Start: 2022-09-14 | End: 2022-09-23 | Stop reason: HOSPADM

## 2022-09-14 RX ORDER — AMLODIPINE BESYLATE 10 MG/1
10 TABLET ORAL DAILY
Status: DISCONTINUED | OUTPATIENT
Start: 2022-09-14 | End: 2022-09-23 | Stop reason: HOSPADM

## 2022-09-14 RX ORDER — TALC
6 POWDER (GRAM) TOPICAL NIGHTLY PRN
Status: DISCONTINUED | OUTPATIENT
Start: 2022-09-14 | End: 2022-09-23 | Stop reason: HOSPADM

## 2022-09-14 RX ADMIN — INSULIN DETEMIR 25 UNITS: 100 INJECTION, SOLUTION SUBCUTANEOUS at 09:09

## 2022-09-14 RX ADMIN — INSULIN ASPART 4 UNITS: 100 INJECTION, SOLUTION INTRAVENOUS; SUBCUTANEOUS at 09:09

## 2022-09-14 RX ADMIN — AMLODIPINE BESYLATE 10 MG: 10 TABLET ORAL at 05:09

## 2022-09-14 RX ADMIN — INSULIN ASPART 4 UNITS: 100 INJECTION, SOLUTION INTRAVENOUS; SUBCUTANEOUS at 05:09

## 2022-09-14 RX ADMIN — CEFTRIAXONE 2 G: 2 INJECTION, SOLUTION INTRAVENOUS at 05:09

## 2022-09-14 RX ADMIN — VANCOMYCIN HYDROCHLORIDE 2000 MG: 10 INJECTION, POWDER, LYOPHILIZED, FOR SOLUTION INTRAVENOUS at 06:09

## 2022-09-14 NOTE — H&P
[facility] Tele[facility] ([facility])  [facility] Medicine  History & Physical    Patient Name: Henry Fernandez  MRN: [id number]  Patient Class: IP- Inpatient  Admission Date: 9/14/2022  Attending Physician: Wyatt العراقي MD   Primary Care Provider: No Maher MD         Patient information was obtained from patient and ER records.     Subjective:     Principal Problem:Diabetic ulcer of right foot    Chief Complaint:      HPI: Mr Fernandez is a 52 year old male with PMHx of HTN, Stroke, DM, HLD and gastroparesis and cholecystectomy who [place] [facility] [place] [facility] for further treatment of Right diabetic foot wound. He ws transferred for Podiatry and Infectious Disease services. Denies associated symptoms of chest pain, shortness of breath, fever or chills. According to [facility] Medicine  MD, Blood cultures at previous hospital positive for Staph schleiferi. He was being treated with Rocephin and Vancomycin.  MRI of the RLE showed an 8.4cm dorsal fluid collection, with a possibilty of osteo. Patient is a full code, sighficant other and mother are surrogate Decision maker. Patient is being admitted under the care of [facility],.       Past Medical History:   Diagnosis Date    Diabetes mellitus     Gastroparesis     Hypertension     Stroke        Past Surgical History:   Procedure Laterality Date    CHOLECYSTECTOMY         Review of patient's allergies indicates:  No Known Allergies    No current facility-administered medications on file prior to encounter.     Current Outpatient Medications on File Prior to Encounter   Medication Sig    amLODIPine (NORVASC) 10 MG tablet Take 10 mg by mouth once daily.    atorvastatin (LIPITOR) 20 MG tablet atorvastatin 20 mg tablet    clopidogreL (PLAVIX) 75 mg tablet clopidogrel 75 mg tablet    insulin aspart U-100 (NOVOLOG) 100 unit/mL injection Inject 25 Units into the skin 3 (three) times daily before meals.    metoclopramide HCl (REGLAN) 10 MG  tablet Take 1 tablet (10 mg total) by mouth 4 (four) times daily.    ondansetron (ZOFRAN) 4 MG tablet Take 1 tablet (4 mg total) by mouth every 8 (eight) hours as needed for Nausea.    insulin glargine (LANTUS SOLOSTAR) 100 unit/mL (3 mL) InPn pen Inject 20 Units into the skin every evening. (Patient taking differently: Inject 50 Units into the skin every evening.)    insulin lispro (ADMELOG U-100 INSULIN LISPRO) 100 unit/mL injection 25 Units 3 (three) times daily after meals.    lisinopril (PRINIVIL,ZESTRIL) 20 MG tablet Take 1 tablet (20 mg total) by mouth once daily. (Patient taking differently: Take 40 mg by mouth once daily.)    ondansetron (ZOFRAN) 4 MG tablet Take 1 tablet (4 mg total) by mouth every 8 (eight) hours as needed.    ondansetron (ZOFRAN-ODT) 4 MG TbDL Take 1 tablet (4 mg total) by mouth every 8 (eight) hours as needed.    promethazine (PHENERGAN) 12.5 MG Tab Take 1 tablet (12.5 mg total) by mouth every 6 (six) hours as needed.    promethazine (PHENERGAN) 25 MG suppository Place 1 suppository (25 mg total) rectally every 6 (six) hours as needed for Nausea.    promethazine (PHENERGAN) 25 MG tablet Take 1 tablet (25 mg total) by mouth every 6 (six) hours as needed for Nausea.     Family History       Problem Relation (Age of Onset)    No Known Problems Mother, Father          Tobacco Use    Smoking status: Never    Smokeless tobacco: Never   Substance and Sexual Activity    Alcohol use: No    Drug use: No    Sexual activity: Yes     Partners: Female     Review of Systems   Constitutional:  Negative for chills, diaphoresis, fatigue and fever.   HENT:  Negative for congestion, ear discharge, rhinorrhea, sinus pressure, sore throat and trouble swallowing.    Eyes:  Negative for discharge and visual disturbance.   Respiratory:  Negative for apnea, cough, choking, chest tightness, shortness of breath, wheezing and stridor.    Cardiovascular:  Positive for leg swelling (right leg swelling).  Negative for chest pain and palpitations.   Gastrointestinal:  Negative for abdominal distention, abdominal pain, diarrhea, nausea and vomiting.   Endocrine: Negative for cold intolerance and heat intolerance.   Genitourinary:  Negative for dysuria, frequency and hematuria.   Musculoskeletal:  Negative for arthralgias, back pain, myalgias and neck pain.        Left side weakness    Skin:  Positive for wound (right foot diabetic wound). Negative for pallor and rash.   Neurological:  Negative for dizziness, seizures, syncope, weakness and headaches.   Psychiatric/Behavioral:  Negative for agitation, confusion and sleep disturbance.    Objective:     Vital Signs (Most Recent):  Temp: 97.1 °F (36.2 °C) (22 1400)  Pulse: 95 (22 1400)  Resp: 16 (22)  BP: (!) 174/92 (22)  SpO2: 97 % (22)   Vital Signs (24h Range):  Temp:  [97.1 °F (36.2 °C)-98.6 °F (37 °C)] 97.1 °F (36.2 °C)  Pulse:  [] 95  Resp:  [16-18] 16  SpO2:  [97 %-99 %] 97 %  BP: (140-174)/(88-97) 174/92     Weight: 106.6 kg (235 lb)  Body mass index is 30.17 kg/m².    Physical Exam  Vitals and nursing note reviewed.   Eyes:      General:         Right eye: No discharge.         Left eye: No discharge.   Cardiovascular:      Heart sounds: No murmur heard.    No friction rub. No gallop.   Pulmonary:      Effort: No respiratory distress.      Breath sounds: No stridor. No wheezing or rhonchi.   Chest:      Chest wall: No tenderness.   Abdominal:      General: There is no distension.      Palpations: There is no mass.      Tenderness: There is no abdominal tenderness. There is no right CVA tenderness, left CVA tenderness, guarding or rebound.      Hernia: No hernia is present.   Musculoskeletal:         General: No swelling, tenderness, deformity or signs of injury.      Right lower le+ Edema present.      Left lower leg: No edema.      Comments: Rt foot with kerlix dressing    Skin:     Coloration: Skin is not  jaundiced or pale.      Findings: No bruising, erythema, lesion or rash.             Assessment/Plan:     * Diabetic ulcer of right foot with abscess   Patient's FSGs are uncontrolled due to hyperglycemia on current medication regimen.  Last A1c reviewed-   Lab Results   Component Value Date    HGBA1C 13.5 (H) 04/03/2016     Most recent fingerstick glucose reviewed-   Recent Labs   Lab 09/14/22  1653   POCTGLUCOSE 243*     Current correctional scale  High  Maintain anti-hyperglycemic dose as follows-   Antihyperglycemics (From admission, onward)    Start     Stop Route Frequency Ordered    09/14/22 1758  insulin aspart U-100 pen 1-10 Units         -- SubQ Before meals & nightly PRN 09/14/22 1658        Hold Oral hypoglycemics while patient is in the hospital.    Consult Podiatry   Arterial ultrasound of lower extremities   Wound care     Bacteremia  Blood culture at previous hospital positive for Staph Schleiferi   Repeat Blood culture   Consult ID   Continue Vanco and Rocephin       History of stroke  History of Stroke with  Left sided weakness   Continue Statin   Lipid panel   Hold Plavix for possible surgery     Hypertension    Continue home meds   Hydralazine IV PRN     Diabetes mellitus  Patient's FSGs are uncontrolled due to hyperglycemia on current medication regimen.  Last A1c reviewed-   Lab Results   Component Value Date    HGBA1C 13.5 (H) 04/03/2016     Most recent fingerstick glucose reviewed-   Recent Labs   Lab 09/14/22  1653   POCTGLUCOSE 243*     Current correctional scale  Medium  Maintain anti-hyperglycemic dose as follows-   Antihyperglycemics (From admission, onward)    Start     Stop Route Frequency Ordered    09/14/22 2100  insulin detemir U-100 pen 25 Units         -- SubQ Nightly 09/14/22 1701    09/14/22 1758  insulin aspart U-100 pen 1-10 Units         -- SubQ Before meals & nightly PRN 09/14/22 1658        Hold Oral hypoglycemics while patient is in the hospital.    Diabetic  gastroparesis  Patient's FSGs are uncontrolled due to hyperglycemia on current medication regimen.  Last A1c reviewed-   Lab Results   Component Value Date    HGBA1C 13.5 (H) 04/03/2016     Most recent fingerstick glucose reviewed-   Recent Labs   Lab 09/14/22  1653   POCTGLUCOSE 243*     Current correctional scale  Medium  Maintain anti-hyperglycemic dose as follows-   Antihyperglycemics (From admission, onward)    Start     Stop Route Frequency Ordered    09/14/22 2100  insulin detemir U-100 pen 25 Units         -- SubQ Nightly 09/14/22 1701    09/14/22 1758  insulin aspart U-100 pen 1-10 Units         -- SubQ Before meals & nightly PRN 09/14/22 1658        Hold Oral hypoglycemics while patient is in the hospital.    Antemetic as needed     VTE Risk Mitigation (From admission, onward)         Ordered     IP VTE HIGH RISK PATIENT  Once         09/14/22 1652     Place sequential compression device  Until discontinued         09/14/22 1652                   Wero Alfonso NP  Department of Hospital Medicine   [facility] - Telemetry ([facility])

## 2022-09-14 NOTE — ASSESSMENT & PLAN NOTE
Blood culture at previous hospital positive for Staph Schleiferi   Repeat Blood culture   Consult ID   Continue Vanco and Rocephin

## 2022-09-14 NOTE — ASSESSMENT & PLAN NOTE
Patient's FSGs are uncontrolled due to hyperglycemia on current medication regimen.  Last A1c reviewed-   Lab Results   Component Value Date    HGBA1C 13.5 (H) 04/03/2016     Most recent fingerstick glucose reviewed-   Recent Labs   Lab 09/14/22  1653   POCTGLUCOSE 243*     Current correctional scale  Medium  Maintain anti-hyperglycemic dose as follows-   Antihyperglycemics (From admission, onward)    Start     Stop Route Frequency Ordered    09/14/22 2100  insulin detemir U-100 pen 25 Units         -- SubQ Nightly 09/14/22 1701    09/14/22 1758  insulin aspart U-100 pen 1-10 Units         -- SubQ Before meals & nightly PRN 09/14/22 1658        Hold Oral hypoglycemics while patient is in the hospital.

## 2022-09-14 NOTE — ASSESSMENT & PLAN NOTE
Patient's FSGs are uncontrolled due to hyperglycemia on current medication regimen.  Last A1c reviewed-   Lab Results   Component Value Date    HGBA1C 13.5 (H) 04/03/2016     Most recent fingerstick glucose reviewed-   Recent Labs   Lab 09/14/22  1653   POCTGLUCOSE 243*     Current correctional scale  High  Maintain anti-hyperglycemic dose as follows-   Antihyperglycemics (From admission, onward)    Start     Stop Route Frequency Ordered    09/14/22 1758  insulin aspart U-100 pen 1-10 Units         -- SubQ Before meals & nightly PRN 09/14/22 1658        Hold Oral hypoglycemics while patient is in the hospital.    Consult Podiatry   Arterial ultrasound of lower extremities

## 2022-09-14 NOTE — SUBJECTIVE & OBJECTIVE
Past Medical History:   Diagnosis Date    Diabetes mellitus     Gastroparesis     Hypertension     Stroke        Past Surgical History:   Procedure Laterality Date    CHOLECYSTECTOMY         Review of patient's allergies indicates:  No Known Allergies    No current facility-administered medications on file prior to encounter.     Current Outpatient Medications on File Prior to Encounter   Medication Sig    amLODIPine (NORVASC) 10 MG tablet Take 10 mg by mouth once daily.    atorvastatin (LIPITOR) 20 MG tablet atorvastatin 20 mg tablet    clopidogreL (PLAVIX) 75 mg tablet clopidogrel 75 mg tablet    insulin aspart U-100 (NOVOLOG) 100 unit/mL injection Inject 25 Units into the skin 3 (three) times daily before meals.    metoclopramide HCl (REGLAN) 10 MG tablet Take 1 tablet (10 mg total) by mouth 4 (four) times daily.    ondansetron (ZOFRAN) 4 MG tablet Take 1 tablet (4 mg total) by mouth every 8 (eight) hours as needed for Nausea.    insulin glargine (LANTUS SOLOSTAR) 100 unit/mL (3 mL) InPn pen Inject 20 Units into the skin every evening. (Patient taking differently: Inject 50 Units into the skin every evening.)    insulin lispro (ADMELOG U-100 INSULIN LISPRO) 100 unit/mL injection 25 Units 3 (three) times daily after meals.    lisinopril (PRINIVIL,ZESTRIL) 20 MG tablet Take 1 tablet (20 mg total) by mouth once daily. (Patient taking differently: Take 40 mg by mouth once daily.)    ondansetron (ZOFRAN) 4 MG tablet Take 1 tablet (4 mg total) by mouth every 8 (eight) hours as needed.    ondansetron (ZOFRAN-ODT) 4 MG TbDL Take 1 tablet (4 mg total) by mouth every 8 (eight) hours as needed.    promethazine (PHENERGAN) 12.5 MG Tab Take 1 tablet (12.5 mg total) by mouth every 6 (six) hours as needed.    promethazine (PHENERGAN) 25 MG suppository Place 1 suppository (25 mg total) rectally every 6 (six) hours as needed for Nausea.    promethazine (PHENERGAN) 25 MG tablet Take 1 tablet (25 mg total) by mouth every 6 (six)  hours as needed for Nausea.     Family History       Problem Relation (Age of Onset)    No Known Problems Mother, Father          Tobacco Use    Smoking status: Never    Smokeless tobacco: Never   Substance and Sexual Activity    Alcohol use: No    Drug use: No    Sexual activity: Yes     Partners: Female     Review of Systems   Constitutional:  Negative for chills, diaphoresis, fatigue and fever.   HENT:  Negative for congestion, ear discharge, rhinorrhea, sinus pressure, sore throat and trouble swallowing.    Eyes:  Negative for discharge and visual disturbance.   Respiratory:  Negative for apnea, cough, choking, chest tightness, shortness of breath, wheezing and stridor.    Cardiovascular:  Positive for leg swelling (right leg swelling). Negative for chest pain and palpitations.   Gastrointestinal:  Negative for abdominal distention, abdominal pain, diarrhea, nausea and vomiting.   Endocrine: Negative for cold intolerance and heat intolerance.   Genitourinary:  Negative for dysuria, frequency and hematuria.   Musculoskeletal:  Negative for arthralgias, back pain, myalgias and neck pain.        Left side weakness    Skin:  Positive for wound (right foot diabetic wound). Negative for pallor and rash.   Neurological:  Negative for dizziness, seizures, syncope, weakness and headaches.   Psychiatric/Behavioral:  Negative for agitation, confusion and sleep disturbance.    Objective:     Vital Signs (Most Recent):  Temp: 97.1 °F (36.2 °C) (09/14/22 1400)  Pulse: 95 (09/14/22 1400)  Resp: 16 (09/14/22 1400)  BP: (!) 174/92 (09/14/22 1400)  SpO2: 97 % (09/14/22 1400)   Vital Signs (24h Range):  Temp:  [97.1 °F (36.2 °C)-98.6 °F (37 °C)] 97.1 °F (36.2 °C)  Pulse:  [] 95  Resp:  [16-18] 16  SpO2:  [97 %-99 %] 97 %  BP: (140-174)/(88-97) 174/92     Weight: 106.6 kg (235 lb)  Body mass index is 30.17 kg/m².    Physical Exam  Vitals and nursing note reviewed.   Eyes:      General:         Right eye: No discharge.          Left eye: No discharge.   Cardiovascular:      Heart sounds: No murmur heard.    No friction rub. No gallop.   Pulmonary:      Effort: No respiratory distress.      Breath sounds: No stridor. No wheezing or rhonchi.   Chest:      Chest wall: No tenderness.   Abdominal:      General: There is no distension.      Palpations: There is no mass.      Tenderness: There is no abdominal tenderness. There is no right CVA tenderness, left CVA tenderness, guarding or rebound.      Hernia: No hernia is present.   Musculoskeletal:         General: No swelling, tenderness, deformity or signs of injury.      Right lower le+ Edema present.      Left lower leg: No edema.      Comments: Rt foot with kerlix dressing    Skin:     Coloration: Skin is not jaundiced or pale.      Findings: No bruising, erythema, lesion or rash.

## 2022-09-14 NOTE — ASSESSMENT & PLAN NOTE
History of Stroke with  Left sided weakness   Continue Statin   Lipid panel   Hold Plavix for possible surgery

## 2022-09-14 NOTE — HPI
Mr Fernandez is a 52 year old male with PMHx of HTN, Stroke, DM, HLD and gastroparesis and cholecystectomy who presents to [facility] [place]- from [facility] for further treatment of Right diabetic foot wound. He ws transferred for Podiatry and Infectious Disease services. Denies associated symptoms of chest pain, shortness of breath, fever or chills. According to [facility]  MD, Blood cultures at previous hospital positive for Staph schleiferi. He was being treated with Rocephin and Vancomycin.  MRI of the RLE showed an 8.4cm dorsal fluid collection, with a possibilty of osteo. Patient is a full code, sighficant other and mother are surrogate Decision maker. Patient is being admitted under the care of Hospital Medicine,.

## 2022-09-14 NOTE — ASSESSMENT & PLAN NOTE
Patient's FSGs are uncontrolled due to hyperglycemia on current medication regimen.  Last A1c reviewed-   Lab Results   Component Value Date    HGBA1C 13.5 (H) 04/03/2016     Most recent fingerstick glucose reviewed-   Recent Labs   Lab 09/14/22  1653   POCTGLUCOSE 243*     Current correctional scale  Medium  Maintain anti-hyperglycemic dose as follows-   Antihyperglycemics (From admission, onward)    Start     Stop Route Frequency Ordered    09/14/22 2100  insulin detemir U-100 pen 25 Units         -- SubQ Nightly 09/14/22 1701    09/14/22 1758  insulin aspart U-100 pen 1-10 Units         -- SubQ Before meals & nightly PRN 09/14/22 1658        Hold Oral hypoglycemics while patient is in the hospital.    Antemetic as needed

## 2022-09-15 ENCOUNTER — ANESTHESIA EVENT (OUTPATIENT)
Dept: SURGERY | Facility: HOSPITAL | Age: 52
DRG: 623 | End: 2022-09-15
Payer: MEDICARE

## 2022-09-15 ENCOUNTER — ANESTHESIA (OUTPATIENT)
Dept: SURGERY | Facility: HOSPITAL | Age: 52
DRG: 623 | End: 2022-09-15
Payer: MEDICARE

## 2022-09-15 PROBLEM — N18.30 CKD (CHRONIC KIDNEY DISEASE) STAGE 3, GFR 30-59 ML/MIN: Status: ACTIVE | Noted: 2022-09-15

## 2022-09-15 LAB
CHOLEST SERPL-MCNC: 99 MG/DL (ref 120–199)
CHOLEST/HDLC SERPL: 6.2 {RATIO} (ref 2–5)
CREAT SERPL-MCNC: 1.5 MG/DL (ref 0.5–1.4)
EST. GFR  (NO RACE VARIABLE): 56 ML/MIN/1.73 M^2
ESTIMATED AVG GLUCOSE: 226 MG/DL (ref 68–131)
HBA1C MFR BLD: 9.5 % (ref 4–5.6)
HDLC SERPL-MCNC: 16 MG/DL (ref 40–75)
HDLC SERPL: 16.2 % (ref 20–50)
LDLC SERPL CALC-MCNC: 60.8 MG/DL (ref 63–159)
NONHDLC SERPL-MCNC: 83 MG/DL
POCT GLUCOSE: 165 MG/DL (ref 70–110)
POCT GLUCOSE: 185 MG/DL (ref 70–110)
POCT GLUCOSE: 193 MG/DL (ref 70–110)
POCT GLUCOSE: 241 MG/DL (ref 70–110)
TRIGL SERPL-MCNC: 111 MG/DL (ref 30–150)
VANCOMYCIN SERPL-MCNC: 14 UG/ML
VANCOMYCIN SERPL-MCNC: 23.7 UG/ML

## 2022-09-15 PROCEDURE — 88311 DECALCIFY TISSUE: CPT | Performed by: PATHOLOGY

## 2022-09-15 PROCEDURE — 11045 PR DEB SUBQ TISSUE ADD-ON: ICD-10-PCS | Mod: 59,,, | Performed by: PODIATRIST

## 2022-09-15 PROCEDURE — 11042 DBRDMT SUBQ TIS 1ST 20SQCM/<: CPT | Mod: 79,51,, | Performed by: PODIATRIST

## 2022-09-15 PROCEDURE — 88311 PR  DECALCIFY TISSUE: ICD-10-PCS | Mod: 26,,, | Performed by: PATHOLOGY

## 2022-09-15 PROCEDURE — 25000003 PHARM REV CODE 250: Performed by: INTERNAL MEDICINE

## 2022-09-15 PROCEDURE — 88305 TISSUE EXAM BY PATHOLOGIST: CPT | Performed by: PATHOLOGY

## 2022-09-15 PROCEDURE — 80202 ASSAY OF VANCOMYCIN: CPT | Performed by: INTERNAL MEDICINE

## 2022-09-15 PROCEDURE — 63600175 PHARM REV CODE 636 W HCPCS: Performed by: INTERNAL MEDICINE

## 2022-09-15 PROCEDURE — 94761 N-INVAS EAR/PLS OXIMETRY MLT: CPT

## 2022-09-15 PROCEDURE — 88307 PR  SURG PATH,LEVEL V: ICD-10-PCS | Mod: 26,,, | Performed by: PATHOLOGY

## 2022-09-15 PROCEDURE — 87205 SMEAR GRAM STAIN: CPT | Performed by: PODIATRIST

## 2022-09-15 PROCEDURE — 36000707: Performed by: PODIATRIST

## 2022-09-15 PROCEDURE — 36415 COLL VENOUS BLD VENIPUNCTURE: CPT | Performed by: NURSE PRACTITIONER

## 2022-09-15 PROCEDURE — 88307 TISSUE EXAM BY PATHOLOGIST: CPT | Mod: 26,,, | Performed by: PATHOLOGY

## 2022-09-15 PROCEDURE — 37000008 HC ANESTHESIA 1ST 15 MINUTES: Performed by: PODIATRIST

## 2022-09-15 PROCEDURE — 87186 SC STD MICRODIL/AGAR DIL: CPT | Performed by: PODIATRIST

## 2022-09-15 PROCEDURE — 80202 ASSAY OF VANCOMYCIN: CPT | Mod: 91 | Performed by: PODIATRIST

## 2022-09-15 PROCEDURE — 63600175 PHARM REV CODE 636 W HCPCS: Performed by: NURSE ANESTHETIST, CERTIFIED REGISTERED

## 2022-09-15 PROCEDURE — 21400001 HC TELEMETRY ROOM

## 2022-09-15 PROCEDURE — 25000003 PHARM REV CODE 250: Performed by: NURSE PRACTITIONER

## 2022-09-15 PROCEDURE — 87070 CULTURE OTHR SPECIMN AEROBIC: CPT | Performed by: PODIATRIST

## 2022-09-15 PROCEDURE — 11042 PR DEBRIDEMENT, SKIN, SUB-Q TISSUE,=<20 SQ CM: ICD-10-PCS | Mod: 79,51,, | Performed by: PODIATRIST

## 2022-09-15 PROCEDURE — 37000009 HC ANESTHESIA EA ADD 15 MINS: Performed by: PODIATRIST

## 2022-09-15 PROCEDURE — 20220 BONE BIOPSY TROCAR/NDL SUPFC: CPT | Mod: 79,59,, | Performed by: PODIATRIST

## 2022-09-15 PROCEDURE — 27000221 HC OXYGEN, UP TO 24 HOURS

## 2022-09-15 PROCEDURE — 87075 CULTR BACTERIA EXCEPT BLOOD: CPT | Performed by: PODIATRIST

## 2022-09-15 PROCEDURE — 88311 DECALCIFY TISSUE: CPT | Mod: 26,,, | Performed by: PATHOLOGY

## 2022-09-15 PROCEDURE — 10061 PR DRAIN SKIN ABSCESS COMPLIC: ICD-10-PCS | Mod: 58,,, | Performed by: PODIATRIST

## 2022-09-15 PROCEDURE — 10061 I&D ABSCESS COMP/MULTIPLE: CPT | Mod: 58,,, | Performed by: PODIATRIST

## 2022-09-15 PROCEDURE — 87070 CULTURE OTHR SPECIMN AEROBIC: CPT | Mod: 59 | Performed by: PODIATRIST

## 2022-09-15 PROCEDURE — 36000706: Performed by: PODIATRIST

## 2022-09-15 PROCEDURE — 25000003 PHARM REV CODE 250: Performed by: NURSE ANESTHETIST, CERTIFIED REGISTERED

## 2022-09-15 PROCEDURE — 20220 PR BONE BIOPSY,TROCAR/NEEDLE SUPERF: ICD-10-PCS | Mod: 79,59,, | Performed by: PODIATRIST

## 2022-09-15 PROCEDURE — 71000033 HC RECOVERY, INTIAL HOUR: Performed by: PODIATRIST

## 2022-09-15 PROCEDURE — 82565 ASSAY OF CREATININE: CPT | Performed by: PODIATRIST

## 2022-09-15 PROCEDURE — 11045 DBRDMT SUBQ TISS EACH ADDL: CPT | Mod: 59,,, | Performed by: PODIATRIST

## 2022-09-15 PROCEDURE — 63600175 PHARM REV CODE 636 W HCPCS: Performed by: PODIATRIST

## 2022-09-15 PROCEDURE — 87077 CULTURE AEROBIC IDENTIFY: CPT | Performed by: PODIATRIST

## 2022-09-15 PROCEDURE — 80061 LIPID PANEL: CPT | Performed by: NURSE PRACTITIONER

## 2022-09-15 PROCEDURE — 25000003 PHARM REV CODE 250: Performed by: PODIATRIST

## 2022-09-15 RX ORDER — ONDANSETRON 2 MG/ML
INJECTION INTRAMUSCULAR; INTRAVENOUS
Status: DISCONTINUED | OUTPATIENT
Start: 2022-09-15 | End: 2022-09-15

## 2022-09-15 RX ORDER — LIDOCAINE HYDROCHLORIDE 10 MG/ML
INJECTION, SOLUTION EPIDURAL; INFILTRATION; INTRACAUDAL; PERINEURAL
Status: DISCONTINUED | OUTPATIENT
Start: 2022-09-15 | End: 2022-09-15

## 2022-09-15 RX ORDER — SODIUM CHLORIDE 9 MG/ML
INJECTION, SOLUTION INTRAVENOUS CONTINUOUS
Status: ACTIVE | OUTPATIENT
Start: 2022-09-15 | End: 2022-09-16

## 2022-09-15 RX ORDER — MIDAZOLAM HYDROCHLORIDE 1 MG/ML
INJECTION, SOLUTION INTRAMUSCULAR; INTRAVENOUS
Status: DISCONTINUED | OUTPATIENT
Start: 2022-09-15 | End: 2022-09-15

## 2022-09-15 RX ORDER — ONDANSETRON 2 MG/ML
4 INJECTION INTRAMUSCULAR; INTRAVENOUS DAILY PRN
Status: DISCONTINUED | OUTPATIENT
Start: 2022-09-15 | End: 2022-09-15 | Stop reason: HOSPADM

## 2022-09-15 RX ORDER — OXYCODONE AND ACETAMINOPHEN 5; 325 MG/1; MG/1
1 TABLET ORAL
Status: DISCONTINUED | OUTPATIENT
Start: 2022-09-15 | End: 2022-09-15 | Stop reason: HOSPADM

## 2022-09-15 RX ORDER — PROPOFOL 10 MG/ML
VIAL (ML) INTRAVENOUS
Status: DISCONTINUED | OUTPATIENT
Start: 2022-09-15 | End: 2022-09-15

## 2022-09-15 RX ORDER — BUPIVACAINE HYDROCHLORIDE 5 MG/ML
INJECTION, SOLUTION EPIDURAL; INTRACAUDAL
Status: DISCONTINUED | OUTPATIENT
Start: 2022-09-15 | End: 2022-09-15 | Stop reason: HOSPADM

## 2022-09-15 RX ORDER — HYDROMORPHONE HYDROCHLORIDE 2 MG/ML
0.2 INJECTION, SOLUTION INTRAMUSCULAR; INTRAVENOUS; SUBCUTANEOUS EVERY 5 MIN PRN
Status: DISCONTINUED | OUTPATIENT
Start: 2022-09-15 | End: 2022-09-15 | Stop reason: HOSPADM

## 2022-09-15 RX ADMIN — PROPOFOL 50 MG: 10 INJECTION, EMULSION INTRAVENOUS at 12:09

## 2022-09-15 RX ADMIN — INSULIN DETEMIR 25 UNITS: 100 INJECTION, SOLUTION SUBCUTANEOUS at 10:09

## 2022-09-15 RX ADMIN — VANCOMYCIN HYDROCHLORIDE 1500 MG: 1.5 INJECTION, POWDER, LYOPHILIZED, FOR SOLUTION INTRAVENOUS at 08:09

## 2022-09-15 RX ADMIN — PROPOFOL 30 MG: 10 INJECTION, EMULSION INTRAVENOUS at 12:09

## 2022-09-15 RX ADMIN — LIDOCAINE HYDROCHLORIDE 50 MG: 10 INJECTION, SOLUTION EPIDURAL; INFILTRATION; INTRACAUDAL; PERINEURAL at 12:09

## 2022-09-15 RX ADMIN — CEFTRIAXONE 2 G: 2 INJECTION, SOLUTION INTRAVENOUS at 05:09

## 2022-09-15 RX ADMIN — ONDANSETRON 4 MG: 2 INJECTION, SOLUTION INTRAMUSCULAR; INTRAVENOUS at 12:09

## 2022-09-15 RX ADMIN — SODIUM CHLORIDE, POTASSIUM CHLORIDE, SODIUM LACTATE AND CALCIUM CHLORIDE: 600; 310; 30; 20 INJECTION, SOLUTION INTRAVENOUS at 12:09

## 2022-09-15 RX ADMIN — INSULIN ASPART 2 UNITS: 100 INJECTION, SOLUTION INTRAVENOUS; SUBCUTANEOUS at 10:09

## 2022-09-15 RX ADMIN — AMLODIPINE BESYLATE 10 MG: 10 TABLET ORAL at 08:09

## 2022-09-15 RX ADMIN — INSULIN ASPART 2 UNITS: 100 INJECTION, SOLUTION INTRAVENOUS; SUBCUTANEOUS at 05:09

## 2022-09-15 RX ADMIN — MIDAZOLAM 2 MG: 1 INJECTION INTRAMUSCULAR; INTRAVENOUS at 12:09

## 2022-09-15 RX ADMIN — SODIUM CHLORIDE: 9 INJECTION, SOLUTION INTRAVENOUS at 06:09

## 2022-09-15 NOTE — CONSULTS
Consult received. Patient in OR. Will revisit tomorrow to apply wound vac per dr hernadez consult.

## 2022-09-15 NOTE — H&P (VIEW-ONLY)
[facility] - Telemetry ([facility])  Podiatry  Consult Note    Patient Name: Henry Fernandez  MRN: [id number]  Admission Date: 9/14/2022  Hospital Length of Stay: 0 days  Attending Physician: Wyatt العراقي MD  Primary Care Provider: No Maher MD     Inpatient consult to Podiatry  Consult performed by: Tatyana Siddiqui DPM  Consult ordered by: Wero Alfonso NP  Reason for consult: Abscess right foot.  Charcot versus osteo  Assessment/Recommendations:     Plan for incision drainage of right foot abscess on 09/15/2022.  Please assess patient's CD record as no MRI was able to be visualized.  No MRI report is in the patient's chart.  This would be helpful to determine which bone/osseous structures would be needed to biopsy to assess for underlying pathology.  He is not interested in surgical amputation at this time so would likely recommend 6-8 weeks of IV antibiotics based on abscess cultures taken from 09/14/2022.    Upon discharge, he is to continue to follow-up at [facility] wound center.  Likely will need a wound VAC prior to discharge as the wound will be packed open        Subjective:     History of Present Illness:  Mr Fernandez is a 52 year old male with PMHx of HTN, Stroke, DM, HLD and gastroparesis and cholecystectomy who [place] [facility] [place] [facility] for further treatment of Right diabetic foot wound. He was seeing [facility] wound care for a charcot ulceration to the plantar aspect of the right foot which she has been managing.  States that he had subsequently developed increased pain and swelling to the foot.  Per his records, there has been discussion of a Streptococcus infection to his knee.  No imaging of the knee appears to be performed.  He ws transferred for Podiatry and Infectious Disease services with a disc for his MRI and blood flow scan.  Unfortunately, the CD provided with the patient does not show the results of his MRI but rather only his arterial  duplex.    According to Hospital Medicine  MD, Blood cultures at previous hospital positive for Staph schleiferi. He was being treated with Rocephin and Vancomycin.  MRI of the RLE showed an 8.4cm dorsal fluid collection, with a possibilty of osteomyelitis.  Unfortunately, the pictures/scans are not available.  Also, the written report is not available in the patient's chart or hard copy medical records provided with the patient on transfer.          Scheduled Meds:   amLODIPine  10 mg Oral Daily    [START ON 9/15/2022] atorvastatin  20 mg Oral Daily    cefTRIAXone (ROCEPHIN) IVPB  2 g Intravenous Q24H    insulin detemir U-100  25 Units Subcutaneous QHS     Continuous Infusions:  PRN Meds:acetaminophen, albuterol-ipratropium, aluminum-magnesium hydroxide-simethicone, glucagon (human recombinant), glucose, glucose, insulin aspart U-100, melatonin, naloxone, ondansetron, polyethylene glycol, simethicone, sodium chloride 0.9%, Pharmacy to dose Vancomycin consult **AND** vancomycin - pharmacy to dose    Review of patient's allergies indicates:  No Known Allergies     Past Medical History:   Diagnosis Date    Diabetes mellitus     Gastroparesis     Hypertension     Stroke      Past Surgical History:   Procedure Laterality Date    CHOLECYSTECTOMY         Family History       Problem Relation (Age of Onset)    No Known Problems Mother, Father          Tobacco Use    Smoking status: Never    Smokeless tobacco: Never   Substance and Sexual Activity    Alcohol use: No    Drug use: No    Sexual activity: Yes     Partners: Female     Review of Systems   Constitutional:  Positive for activity change and appetite change.   HENT:  Negative for congestion and hearing loss.    Respiratory:  Negative for chest tightness.    Cardiovascular:  Positive for leg swelling. Negative for palpitations.   Gastrointestinal:  Positive for nausea and vomiting. Negative for abdominal pain and diarrhea.   Musculoskeletal:   Positive for arthralgias and gait problem. Negative for myalgias.   Skin:  Positive for color change and wound.   Neurological:  Positive for weakness and numbness. Negative for seizures and speech difficulty.   Psychiatric/Behavioral:  Negative for decreased concentration and dysphoric mood.    Objective:     Vital Signs (Most Recent):  Temp: 99 °F (37.2 °C) (09/14/22 2105)  Pulse: 100 (09/14/22 2105)  Resp: 20 (09/14/22 2105)  BP: (!) 179/88 (09/14/22 2105)  SpO2: (!) 93 % (09/14/22 2105)   Vital Signs (24h Range):  Temp:  [97.1 °F (36.2 °C)-99 °F (37.2 °C)] 99 °F (37.2 °C)  Pulse:  [] 100  Resp:  [16-20] 20  SpO2:  [93 %-99 %] 93 %  BP: (140-179)/(88-97) 179/88     Weight: 106.6 kg (235 lb)  Body mass index is 30.17 kg/m².    Foot Exam  CONSTITUTIONAL:  Patient is awake, alert, and oriented to person, place, and time.  Patient is well groomed with normal appearance.  No activity change.  No appetite change.    EYES:  Pupils are equal and reactive to light.  No icterus    ENT:  Mucous membranes are moist.  Dentition intact.    PULMONARY:  Breathing is nonlabored.  No wheezing or rales    VASCULAR:  The right dorsalis pedis pulse 2/4 and the right posterior tibial pulse 1/4.  The left dorsalis pedis pulse 2/4 and posterior tibial pulse on the left is 1/4.  Capillary refill is intact.  Pedal hair growth decreased.     NEUROLOGICAL:  Protective sensation is not intact to the right left foot.  Vibratory sensation is diminished.  Proprioception is intact.  Sharp/dull is reduced.    DERMATOLOGICAL:  Skin is erythematous with underlying fluctuance most notably at the medial aspect of the right foot.  Increased swelling to the right lower extremity.  Increase in warmth.  Charcot ulceration present to the plantar aspect of the foot.  Ulcer measures approximately 1.5 cm circumferentially and limited to the fat layer.  Does not appear to probe to bone plantarly.              MUSCULOSKELETAL:  History of Charcot neuro  arthropathy of the right foot with prominent navicular tuberosity plantarly.      Laboratory:  A1C: No results for input(s): HGBA1C in the last 4320 hours.  ABGs: No results for input(s): PH, PCO2, HCO3, POCSATURATED, BE in the last 168 hours.  Blood Cultures: No results for input(s): LABBLOO in the last 48 hours.  BMP:   Recent Labs   Lab 09/14/22  1630   *   *   K 3.7      CO2 21*   BUN 16   CREATININE 1.7*   CALCIUM 8.3*     Cardiac Markers: No results for input(s): CKMB, TROPONINT, MYOGLOBIN in the last 168 hours.  CBC:   Recent Labs   Lab 09/14/22  1629   WBC 21.51*   RBC 3.64*   HGB 10.6*   HCT 32.5*      MCV 89   MCH 29.1   MCHC 32.6     CMP:   Recent Labs   Lab 09/14/22  1630   *   CALCIUM 8.3*   ALBUMIN 1.9*   PROT 6.9   *   K 3.7   CO2 21*      BUN 16   CREATININE 1.7*   ALKPHOS 98   ALT 18   AST 22   BILITOT 0.8     CRP:   Recent Labs   Lab 09/14/22  1630   .6*     ESR:   Recent Labs   Lab 09/14/22  1630   SEDRATE 120*     Wound Cultures: No results for input(s): LABAERO in the last 4320 hours.  Microbiology Results (last 7 days)       Procedure Component Value Units Date/Time    Aerobic culture [[id number]]     Order Status: No result Specimen: Abscess from Foot, Right     Blood culture [[id number]] Collected: 09/14/22 1918    Order Status: Sent Specimen: Blood from Peripheral, Right Arm Updated: 09/14/22 1918    Narrative:      Collection has been rescheduled by ADM1 at 09/14/2022 17:27 Reason:   Talk With Nurse Annie Will Come Back In 30 for Second Set  Collection has been rescheduled by ADM1 at 09/14/2022 17:27 Reason:   Talk With Nurse Annie Will Come Back In 30 for Second Set    Culture, MRSA [[id number]] Collected: 09/14/22 1719    Order Status: Sent Specimen: MRSA source from Nares, Left Updated: 09/14/22 1751    Blood culture [[id number]] Collected: 09/14/22 1726    Order Status: Sent Specimen: Blood from Peripheral, Right Arm Updated: 09/14/22  1727          Specimen (24h ago, onward)      None          No results for input(s): COLORU, CLARITYU, SPECGRAV, PHUR, PROTEINUA, GLUCOSEU, BILIRUBINCON, BLOODU, WBCU, RBCU, BACTERIA, MUCUS, NITRITE, LEUKOCYTESUR, UROBILINOGEN, HYALINECASTS in the last 168 hours.    Diagnostic Results:  MRI taken at outside facility is unable to be viewed as it is not on the CD disc provided.  Arterial duplex is present in patient's CD from outside hospital.    Clinical Findings:  Cellulitis, Charcot neuro arthropathy with underlying abscess.      All labs and imaging were reviewed in detail with the patient in the room.    Medical history and chart was thoroughly reviewed.    Plan discussed with attending provider           Assessment/Plan:     * Diabetic ulcer of right foot with abscess   Ulceration to the plantar aspect foot associated with Charcot neuro arthropathy.  There is no evidence of probe to bone at this location.  MRI which is noted to be have been performed in outside facility is unable to be assessed, viewed, or the report read as this is not in the patient's current medical record or hard copy of the patient's chart provided with him on transfer.    Did discussed performing bedside I and D of the right foot to obtain wound cultures and determine pathology specimen causing abscess.  Patient was agreeable and did consent to a bedside I and D to decompress the foot.  Approximately 20 cc of purulent material was expressed.  Culture was obtained.  The wound was packed open with Nu Gauze packing.  Will plan surgical formal incision drainage on 09/15/2022 as patient is not currently NPO    The procedure of (incision drainage of right foot, possible bone biopsy) was thoroughly explained to the patient. Its necessity and/or purpose and the implications therein were outlined, including any pertinent advantages and/or disadvantages, and possible complications, if any. Possible complications include recurrence of pathology  and/or deformity, infection (cellulitis, drainage, purulence, malodor, etc...), pain, numbness, neuritis, edema, burning, loss of function, need for further surgery, possible need for removal of any implanted hardware, soft tissue contracture and/or scarring, etc... No guarantees were given and/or implied. Post-operative expectations and weightbearing protocol is thoroughly explained the patient, who acknowledges understanding.     Bacteremia  Infectious disease consulted.  Would appreciate ID opinion on underlying osteomyelitis versus Charcot arthropathy and bone biopsy.  Patient reports he is not interested in surgical amputation on 09/15/2022.  Likely will perform incision and drainage to flush the area as the appropriate wound culture was taken on 09/14/2022 of the abscess.    Diabetes mellitus  Discussed the importance of appropriate A1c as patient does have uncontrolled diabetes mellitus with last A1c of 13.5.  This does pose risk for surgical healing complications.        Thank you for your consult  NPO at midnight  Please obtain MRI report to determine which osseous structures showed increased marrow edema concerning for possible osteo versus Charcot arthropathy.    Tatyana Siddiqui DPM  Podiatry  [facility] - Telemetry ([facility])

## 2022-09-15 NOTE — ASSESSMENT & PLAN NOTE
Patient's FSGs are uncontrolled due to hyperglycemia on current medication regimen.  Last A1c reviewed-   Lab Results   Component Value Date    HGBA1C 9.5 (H) 09/14/2022     Most recent fingerstick glucose reviewed-   Recent Labs   Lab 09/14/22  2108 09/15/22  0605 09/15/22  1303   POCTGLUCOSE 303* 193* 165*     Current correctional scale  Medium  Maintain anti-hyperglycemic dose as follows-   Antihyperglycemics (From admission, onward)    Start     Stop Route Frequency Ordered    09/14/22 2100  insulin detemir U-100 pen 25 Units         -- SubQ Nightly 09/14/22 2015 09/14/22 1758  insulin aspart U-100 pen 1-10 Units         -- SubQ Before meals & nightly PRN 09/14/22 1658        Hold Oral hypoglycemics while patient is in the hospital.    Antemetic as needed

## 2022-09-15 NOTE — ASSESSMENT & PLAN NOTE
Ulceration to the plantar aspect foot associated with Charcot neuro arthropathy.  There is no evidence of probe to bone at this location.  MRI which is noted to be have been performed in outside facility is unable to be assessed, viewed, or the report read as this is not in the patient's current medical record or hard copy of the patient's chart provided with him on transfer.    Did discussed performing bedside I and D of the right foot to obtain wound cultures and determine pathology specimen causing abscess.  Patient was agreeable and did consent to a bedside I and D to decompress the foot.  Approximately 20 cc of purulent material was expressed.  Culture was obtained.  The wound was packed open with Nu Gauze packing.  Will plan surgical formal incision drainage on 09/15/2022 as patient is not currently NPO    The procedure of (incision drainage of right foot, possible bone biopsy) was thoroughly explained to the patient. Its necessity and/or purpose and the implications therein were outlined, including any pertinent advantages and/or disadvantages, and possible complications, if any. Possible complications include recurrence of pathology and/or deformity, infection (cellulitis, drainage, purulence, malodor, etc...), pain, numbness, neuritis, edema, burning, loss of function, need for further surgery, possible need for removal of any implanted hardware, soft tissue contracture and/or scarring, etc... No guarantees were given and/or implied. Post-operative expectations and weightbearing protocol is thoroughly explained the patient, who acknowledges understanding.

## 2022-09-15 NOTE — CONSULTS
Pharmacokinetic Initial Assessment: IV Vancomycin    Assessment/Plan:    Initiate intravenous vancomycin with loading dose of 2000 mg once with subsequent doses when random concentrations are less than 20 mcg/mL. Desired empiric serum trough concentration is 15 to 20 mcg/mL. Draw vancomycin random level on 9/15 at 0600.    Pharmacy will continue to follow and monitor vancomycin.      Please contact pharmacy at [phone] with any questions regarding this assessment.     Thank you for the consult,   Andrei Caldwell, PharmD 9/14/2022 7:05 PM       Patient brief summary:  Henry Fernandez is a 52 y.o. male initiated on antimicrobial therapy with IV Vancomycin for treatment of suspected  Staph bacteremia and diabetic foot ulcer w/ abscess.    Drug Allergies:   Review of patient's allergies indicates:  No Known Allergies    Actual Body Weight:   106.6 kg    Renal Function:   Estimated Creatinine Clearance: 66.9 mL/min (A) (based on SCr of 1.7 mg/dL (H)).,     Dialysis Method (if applicable):  N/A    CBC (last 72 hours):  Recent Labs   Lab Result Units 09/14/22  1629   WBC K/uL 21.51*   Hemoglobin g/dL 10.6*   Hematocrit % 32.5*   Platelets K/uL 429   Gran % % 76.0*   Lymph % % 9.0*   Mono % % 12.0   Eosinophil % % 3.0   Basophil % % 0.0   Differential Method  Manual       Metabolic Panel (last 72 hours):  Recent Labs   Lab Result Units 09/14/22  1630   Sodium mmol/L 133*   Potassium mmol/L 3.7   Chloride mmol/L 100   CO2 mmol/L 21*   Glucose mg/dL 256*   BUN mg/dL 16   Creatinine mg/dL 1.7*   Albumin g/dL 1.9*   Total Bilirubin mg/dL 0.8   Alkaline Phosphatase U/L 98   AST U/L 22   ALT U/L 18       Drug levels (last 3 results):  No results for input(s): VANCOMYCINRA, VANCORANDOM, VANCOMYCINPE, VANCOPEAK, VANCOMYCINTR, VANCOTROUGH in the last 72 hours.    Microbiologic Results:  Microbiology Results (last 7 days)       Procedure Component Value Units Date/Time    Culture, MRSA [[id number]] Collected: 09/14/22 1719    Order  Status: Sent Specimen: MRSA source from Nares, Left Updated: 09/14/22 1751    Blood culture [[id number]] Collected: 09/14/22 1726    Order Status: Sent Specimen: Blood from Peripheral, Right Arm Updated: 09/14/22 1727    Blood culture [[id number]]     Order Status: Sent Specimen: Blood

## 2022-09-15 NOTE — ASSESSMENT & PLAN NOTE
9/15   S/P- I & D, Debridement and bone biopsy today   Monitor wound cultures   Possible wound vac in 1-2 days   Continue IV antibiotic  ID consulted

## 2022-09-15 NOTE — SUBJECTIVE & OBJECTIVE
Interval History: S/P  I & D, Debridement and bone biopsy today. Will follow.     Review of Systems   Constitutional:  Positive for activity change and chills. Negative for diaphoresis, fatigue and fever.   HENT:  Negative for congestion, ear discharge, rhinorrhea, sinus pressure, sore throat and trouble swallowing.    Eyes:  Negative for discharge and visual disturbance.   Respiratory:  Negative for apnea, cough, choking, chest tightness, shortness of breath, wheezing and stridor.    Cardiovascular:  Positive for leg swelling (right leg swelling). Negative for chest pain and palpitations.   Gastrointestinal:  Negative for abdominal distention, abdominal pain, diarrhea, nausea and vomiting.   Endocrine: Negative for cold intolerance and heat intolerance.   Genitourinary:  Negative for dysuria, frequency and hematuria.   Musculoskeletal:  Negative for arthralgias, back pain, myalgias and neck pain.        Left side weakness    Skin:  Positive for wound (right foot diabetic wound). Negative for pallor and rash.   Neurological:  Positive for weakness. Negative for dizziness, seizures, syncope and headaches.   Psychiatric/Behavioral:  Negative for agitation, confusion and sleep disturbance.    Objective:     Vital Signs (Most Recent):  Temp: 98 °F (36.7 °C) (09/15/22 1658)  Pulse: 109 (09/15/22 1658)  Resp: 18 (09/15/22 1658)  BP: (!) 168/93 (09/15/22 1658)  SpO2: (!) 94 % (09/15/22 1658)   Vital Signs (24h Range):  Temp:  [97 °F (36.1 °C)-99 °F (37.2 °C)] 98 °F (36.7 °C)  Pulse:  [] 109  Resp:  [15-20] 18  SpO2:  [91 %-99 %] 94 %  BP: (113-179)/(67-93) 168/93     Weight: 103.3 kg (227 lb 11.8 oz)  Body mass index is 29.24 kg/m².    Intake/Output Summary (Last 24 hours) at 9/15/2022 1719  Last data filed at 9/15/2022 1444  Gross per 24 hour   Intake 749.65 ml   Output 3500 ml   Net -2750.35 ml      Physical Exam  Vitals and nursing note reviewed.   Eyes:      General:         Right eye: No discharge.         Left  eye: No discharge.   Cardiovascular:      Heart sounds: No murmur heard.    No friction rub. No gallop.   Pulmonary:      Effort: No respiratory distress.      Breath sounds: No stridor. No wheezing or rhonchi.   Chest:      Chest wall: No tenderness.   Abdominal:      General: There is no distension.      Palpations: There is no mass.      Tenderness: There is no abdominal tenderness. There is no right CVA tenderness, left CVA tenderness, guarding or rebound.      Hernia: No hernia is present.   Musculoskeletal:         General: No swelling, tenderness, deformity or signs of injury.      Right lower le+ Edema present.      Left lower leg: No edema.      Comments: Rt foot with kerlix dressing    Skin:     Coloration: Skin is not jaundiced or pale.      Findings: No bruising, erythema, lesion or rash.       Significant Labs: All pertinent labs within the past 24 hours have been reviewed.  CBC:   Recent Labs   Lab 22  1629   WBC 21.51*   HGB 10.6*   HCT 32.5*        CMP:   Recent Labs   Lab 22  1630   *   K 3.7      CO2 21*   *   BUN 16   CREATININE 1.7*   CALCIUM 8.3*   PROT 6.9   ALBUMIN 1.9*   BILITOT 0.8   ALKPHOS 98   AST 22   ALT 18   ANIONGAP 12

## 2022-09-15 NOTE — HOSPITAL COURSE
Patient is a 52 year old male who presented to [facility] from [facility] for Podiatry and Infectious Disease Services. Podiatry following, patient under I & D at bedside last night. He was taken for surgery today and underwent I & D, Debridement and bone biopsy. Post procedure, patient doing well, vital signs stable. Will continue IV antibiotics, possible wound vac in 1-2 days.     9/16 He denies any complaint for today . He is s/p I&D of Right foot Diabetic wound and abscess  per Podiatrist . The WBC is trending up and IVAB change fron rocephin to Cefepiem and metronidazol ID consulted .     9/17 he is complaining of nause /vomiting . The wound cx are (+) for staph . The kidney fucntion is improving . Cont IVAB  and wound care     9/18 Wond cx (+) for staph . NAEON . CM consulted for d/c planning . Will need 6 week IVAB .     9/19  ID consulted . NAEON . Plan  for a  PICC line today  . The Blood  Cx are NGTD . No new complaint .     9/20 Wound vac in place . PT/OT rec SNF . Wound cx (+) for MSSA.  NAEON .     9/21 Wound vac placed yesterday . PT/OT rec SNF placement .  NAEON . CM consulted for placement.    9/22 NAEON . Aawaiting for placememnt . Wound vac in place . ID cosnult noted .     9/23- Pt accepted to  [facility] SNF. Wound care suggested wet to dry dressing on wound and [facility] will place their wound vac and dressing upon arrival. ID suggested 6 weeks antibiotic treatment with Ancef 2 gram IV q8h and oral Flagyl 500 mg po tid with EOC 10/26/2022. Pt is examined and deemed stable for discharge to SNF.

## 2022-09-15 NOTE — HOSPITAL COURSE
Patient transferred from [facility].  Per patient report and records common MRI was ordered.  Unfortunately, no MRI reports are available in the patient's hard copy records provided with the patient.  A CD also does have a copy of his recent arterial duplex, however no MRI report or MRI pictures are available on the CD either.    Started with IV antibiotics and has currently eating dinner today.    9/15/22- Planned I&D with bone biopsy. Wound packed opne    9/16/22- wound is packed open.  There is noted to have some drainage to the right foot I and D site.  Continuing to follow cultures the deep tissue swab as well as bone cultures from the medial cuneiform to determine antibiotic duration.

## 2022-09-15 NOTE — ASSESSMENT & PLAN NOTE
Review of some previous labs, looks like renal function around baseline   Will start gentle hydration and assess response

## 2022-09-15 NOTE — ANESTHESIA PREPROCEDURE EVALUATION
09/15/2022  Henry Fernandez is a 52 y.o., male.    Patient Active Problem List   Diagnosis    Generalized abdominal pain    Dehydration    Diabetic gastroparesis    Vomiting    Diabetes type 2, uncontrolled    Diabetic ketoacidosis    Intractable cyclical vomiting with nausea    Diabetic ketoacidosis without coma associated with type 2 diabetes mellitus    Pyelonephritis    Diabetes mellitus    Hypertension    Diabetic ulcer of right foot with abscess     History of stroke    Bacteremia    Abscess of foot without toes, right    Charcot's joint of foot, right     Past Surgical History:   Procedure Laterality Date    CHOLECYSTECTOMY         Pre-op Assessment    I have reviewed the Patient Summary Reports.    I have reviewed the NPO Status.   I have reviewed the Medications.     Review of Systems  Anesthesia Hx:  No problems with previous Anesthesia    Social:  Non-Smoker    Hematology/Oncology:         -- Anemia:   Cardiovascular:   Hypertension    Pulmonary:  Pulmonary Normal    Renal/:  Renal/ Normal     Hepatic/GI:  Hepatic/GI Normal    Neurological:   CVA    Endocrine:   Diabetes        Physical Exam  General: Well nourished    Airway:  Mallampati: II   Mouth Opening: Normal  TM Distance: Normal  Neck ROM: Normal ROM    Dental:  Intact        Anesthesia Plan  Type of Anesthesia, risks & benefits discussed:    Anesthesia Type: Gen ETT, Gen Supraglottic Airway, MAC  Intra-op Monitoring Plan: Standard ASA Monitors  Post Op Pain Control Plan: multimodal analgesia  Induction:  IV  Airway Plan: , Post-Induction  Informed Consent: Informed consent signed with the Patient and all parties understand the risks and agree with anesthesia plan.  All questions answered.   ASA Score: 3    Ready For Surgery From Anesthesia Perspective.     .      Chemistry        Component Value Date/Time     (L)  09/14/2022 1630    K 3.7 09/14/2022 1630     09/14/2022 1630    CO2 21 (L) 09/14/2022 1630    BUN 16 09/14/2022 1630    CREATININE 1.7 (H) 09/14/2022 1630     (H) 09/14/2022 1630        Component Value Date/Time    CALCIUM 8.3 (L) 09/14/2022 1630    ALKPHOS 98 09/14/2022 1630    AST 22 09/14/2022 1630    ALT 18 09/14/2022 1630    BILITOT 0.8 09/14/2022 1630    ESTGFRAFRICA >60 06/27/2016 1056    EGFRNONAA >60 06/27/2016 1056        Lab Results   Component Value Date    WBC 21.51 (H) 09/14/2022    HGB 10.6 (L) 09/14/2022    HCT 32.5 (L) 09/14/2022    MCV 89 09/14/2022     09/14/2022

## 2022-09-15 NOTE — PLAN OF CARE
Problem: Adult Inpatient Plan of Care  Goal: Optimal Comfort and Wellbeing  Outcome: Ongoing, Progressing     Problem: Impaired Wound Healing  Goal: Optimal Wound Healing  Outcome: Ongoing, Progressing     POC reviewed with pt. Pt verbalizes understanding of POC. No questions at this time.  AAOx4. NAD.  NSR on cardiac monitor.  Pt remains free of falls.  No complaints at this time.  Safety measures in place. Will continue to monitor.  Informed pt to call for assistance before getting up. Pt verbalizes understanding.

## 2022-09-15 NOTE — ASSESSMENT & PLAN NOTE
Blood culture at previous hospital positive for Staph Schleiferi   Repeat Blood culture   Consult ID   Continue Vanco and Rocephin       9/15/2022  Repeat BC, no growth to date   Continue IV Vanco and Rocephin   ID consulted

## 2022-09-15 NOTE — ASSESSMENT & PLAN NOTE
Patient's FSGs are uncontrolled due to hyperglycemia on current medication regimen.  Last A1c reviewed-   Lab Results   Component Value Date    HGBA1C 9.5 (H) 09/14/2022     Most recent fingerstick glucose reviewed-   Recent Labs   Lab 09/14/22  2108 09/15/22  0605 09/15/22  1303   POCTGLUCOSE 303* 193* 165*     Current correctional scale  Medium  Maintain anti-hyperglycemic dose as follows-   Antihyperglycemics (From admission, onward)    Start     Stop Route Frequency Ordered    09/14/22 2100  insulin detemir U-100 pen 25 Units         -- SubQ Nightly 09/14/22 2015 09/14/22 1758  insulin aspart U-100 pen 1-10 Units         -- SubQ Before meals & nightly PRN 09/14/22 1658        Hold Oral hypoglycemics while patient is in the hospital.

## 2022-09-15 NOTE — HPI
Mr Fernandez is a 52 year old male with PMHx of HTN, Stroke, DM, HLD and gastroparesis and cholecystectomy who presents to [facility] from [facility] for further treatment of Right diabetic foot wound. He was seeing [facility] wound care for a charcot ulceration to the plantar aspect of the right foot which she has been managing.  States that he had subsequently developed increased pain and swelling to the foot.  Per his records, there has been discussion of a Streptococcus infection to his knee.  No imaging of the knee appears to be performed.  He ws transferred for Podiatry and Infectious Disease services with a disc for his MRI and blood flow scan.  Unfortunately, the CD provided with the patient does not show the results of his MRI but rather only his arterial duplex.    According to Hospital Medicine  MD, Blood cultures at previous hospital positive for Staph schleiferi. He was being treated with Rocephin and Vancomycin.  MRI of the RLE showed an 8.4cm dorsal fluid collection, with a possibilty of osteomyelitis.  Unfortunately, the pictures/scans are not available.  Also, the written report is not available in the patient's chart or hard copy medical records provided with the patient on transfer.

## 2022-09-15 NOTE — PT/OT/SLP PROGRESS
Occupational Therapy      Patient Name:  Henry Fernandez   MRN:  [id number]    OT eval initiated via chart review. Patient not seen today at 11:05 AM secondary to pt currently in surgery and not in room. Will follow-up on a later day.    9/15/2022  Lacie Chandler, OT   [phone]

## 2022-09-15 NOTE — ANESTHESIA POSTPROCEDURE EVALUATION
Anesthesia Post Evaluation    Patient: Henry Fernandez    Procedure(s) Performed: Procedure(s) (LRB):  INCISION AND DRAINAGE, FOOT (Right)  DEBRIDEMENT, FOOT (Right)  BIOPSY, BONE (Right)    Final Anesthesia Type: MAC      Patient location during evaluation: PACU  Patient participation: Yes- Able to Participate  Level of consciousness: awake and alert  Post-procedure vital signs: reviewed and stable  Airway patency: patent      Anesthetic complications: no      Cardiovascular status: blood pressure returned to baseline  Respiratory status: unassisted and spontaneous ventilation  Hydration status: euvolemic  Follow-up not needed.          Vitals Value Taken Time   /88 09/15/22 1349   Temp 36.9 °C (98.4 °F) 09/15/22 1349   Pulse 87 09/15/22 1349   Resp 18 09/15/22 1349   SpO2 94 % 09/15/22 1349         Event Time   Out of Recovery 09/15/2022 13:35:00         Pain/Paula Score: Paula Score: 8 (9/15/2022  1:30 PM)

## 2022-09-15 NOTE — PROGRESS NOTES
Pharmacokinetic Assessment Follow Up: IV Vancomycin    Vancomycin serum concentration assessment(s):    The random level was drawn correctly and can be used to guide therapy at this time. The measurement is above the desired definitive target range of 15 to 20 mcg/mL.    Vancomycin Regimen Plan:    Re-dose when the random level is less than 20 mcg/mL, next level to be drawn at 1730 on 09/15    Drug levels (last 3 results):  Recent Labs   Lab Result Units 09/15/22  0529   Vancomycin, Random ug/mL 23.7       Pharmacy will continue to follow and monitor vancomycin.    Please contact pharmacy at extension [phone] for questions regarding this assessment.    Thank you for the consult,   Tiera Carvalho       Patient brief summary:  Henry Fernandez is a 52 y.o. male initiated on antimicrobial therapy with IV Vancomycin for treatment of  Staph bacteremia and diabetic foot ulcer w/ abscess.    The patient's current regimen is pulse dosing    Drug Allergies:   Review of patient's allergies indicates:  No Known Allergies    Actual Body Weight:   103.3 kg    Renal Function:   Estimated Creatinine Clearance: 65.9 mL/min (A) (based on SCr of 1.7 mg/dL (H)).,     Dialysis Method (if applicable):  N/A    CBC (last 72 hours):  Recent Labs   Lab Result Units 09/14/22  1629 09/14/22  1701   WBC K/uL 21.51*  --    Hemoglobin g/dL 10.6*  --    Hemoglobin A1C %  --  9.5*   Hematocrit % 32.5*  --    Platelets K/uL 429  --    Gran % % 76.0*  --    Lymph % % 9.0*  --    Mono % % 12.0  --    Eosinophil % % 3.0  --    Basophil % % 0.0  --    Differential Method  Manual  --        Metabolic Panel (last 72 hours):  Recent Labs   Lab Result Units 09/14/22  1630   Sodium mmol/L 133*   Potassium mmol/L 3.7   Chloride mmol/L 100   CO2 mmol/L 21*   Glucose mg/dL 256*   BUN mg/dL 16   Creatinine mg/dL 1.7*   Albumin g/dL 1.9*   Total Bilirubin mg/dL 0.8   Alkaline Phosphatase U/L 98   AST U/L 22   ALT U/L 18       Vancomycin Administrations:  vancomycin  given in the last 96 hours                     vancomycin 2 g in dextrose 5 % 500 mL IVPB (mg) 2,000 mg New Bag 09/14/22 1858                    Microbiologic Results:  Microbiology Results (last 7 days)       Procedure Component Value Units Date/Time    Aerobic culture [[id number]]     Order Status: No result Specimen: Abscess from Foot, Right     Blood culture [[id number]] Collected: 09/14/22 1726    Order Status: Sent Specimen: Blood from Peripheral, Right Arm Updated: 09/15/22 0243    Blood culture [[id number]] Collected: 09/14/22 1918    Order Status: Sent Specimen: Blood from Peripheral, Right Arm Updated: 09/15/22 0243    Narrative:      Collection has been rescheduled by ADM1 at 09/14/2022 17:27 Reason:   Talk With Nurse Annie Will Come Back In 30 for Second Set  Collection has been rescheduled by ADM1 at 09/14/2022 17:27 Reason:   Talk With Nurse Annie Will Come Back In 30 for Second Set    Culture, MRSA [[id number]] Collected: 09/14/22 1719    Order Status: Sent Specimen: MRSA source from Nares, Left Updated: 09/15/22 0238    Aerobic culture [[id number]]     Order Status: Canceled Specimen: Abscess from Foot, Right

## 2022-09-15 NOTE — ASSESSMENT & PLAN NOTE
Discussed the importance of appropriate A1c as patient does have uncontrolled diabetes mellitus with last A1c of 13.5.  This does pose risk for surgical healing complications.

## 2022-09-15 NOTE — BRIEF OP NOTE
[facility] - Surgery (Hospital)  Brief Operative Note    SUMMARY     Surgery Date: 9/15/2022     Surgeon(s) and Role:     * Tatyana Siddiqui DPM - Primary    Assisting Surgeon: None    Pre-op Diagnosis:  Diabetic foot ulcer [E11.621, L97.509]  Abscess of foot without toes, right [L02.611]  Charcot's joint of foot, right [M14.671]  Diabetic ulcer of other part of right foot associated with diabetes mellitus due to underlying condition, unspecified ulcer stage [E08.621, L97.519]    Post-op Diagnosis:  Post-Op Diagnosis Codes:     * Diabetic foot ulcer [E11.621, L97.509]     * Abscess of foot without toes, right [L02.611]     * Charcot's joint of foot, right [M14.671]     * Diabetic ulcer of other part of right foot associated with diabetes mellitus due to underlying condition, unspecified ulcer stage [E08.621, L97.519]    Procedure(s) (LRB):  INCISION AND DRAINAGE, FOOT (Right)  DEBRIDEMENT, FOOT (Right)  BIOPSY, BONE (Right)    Anesthesia: Local MAC    Operative Findings: 8cc abscess to the dorsal aspect of the right foot with ulceration to the right plantar foot. Underlying medial cuneiform was questionable for underlying bone infection. Biospy taken for micro and path. Approx. 50-60cc of purulence expressed. Wound packed open. Will assess in 1-2 day for possible wound vac.     Estimated Blood Loss: * No values recorded between 9/15/2022 12:25 PM and 9/15/2022 12:55 PM *    Estimated Blood Loss has been documented.         Specimens:   Specimen (24h ago, onward)       Start     Ordered    09/15/22 1248  Specimen to Pathology, Surgery Other (podiatrry)  Once        Comments: Pre-op Diagnosis: Diabetic foot ulcer [E11.621, L97.509]Abscess of foot without toes, right [L02.611]Charcot's joint of foot, right [M14.671]Diabetic ulcer of other part of right foot associated with diabetes mellitus due to underlying condition, unspecified ulcer stage [E08.621, L97.519]Procedure(s):INCISION AND DRAINAGE, FOOT Number of specimens:  2Name of specimens: 1. Medial Cuneiform Right Foot - PERM2. Medial Cuneiform Right Foot - Micro Tissue Culture     References:    Click here for ordering Quick Tip   Question Answer Comment   Procedure Type: Other podiatrry   Specimen Class: Known or suspected malignancy    Which provider would you like to cc? PRISCILLA ARZOLA    Release to patient Immediate        09/15/22 1247    09/15/22 1229  Specimen to Pathology, Surgery Other (podiatrry)  Once,   Status:  Canceled        Comments: Pre-op Diagnosis: Diabetic foot ulcer [E11.621, L97.509]Abscess of foot without toes, right [L02.611]Charcot's joint of foot, right [M14.671]Diabetic ulcer of other part of right foot associated with diabetes mellitus due to underlying condition, unspecified ulcer stage [E08.621, L97.519]Procedure(s):INCISION AND DRAINAGE, FOOT Number of specimens: 1Name of specimens: 1. Medial Cuneiform Right Foot - PERM2. Medial Cuneiform Right Foot - Micro Tissue Culture     References:    Click here for ordering Quick Tip   Question Answer Comment   Procedure Type: Other podiatrry   Specimen Class: Known or suspected malignancy    Which provider would you like to cc? PRISCILLA ARZOLA    Release to patient Immediate        09/15/22 1237                    [id number]

## 2022-09-15 NOTE — SUBJECTIVE & OBJECTIVE
Scheduled Meds:   amLODIPine  10 mg Oral Daily    [START ON 9/15/2022] atorvastatin  20 mg Oral Daily    cefTRIAXone (ROCEPHIN) IVPB  2 g Intravenous Q24H    insulin detemir U-100  25 Units Subcutaneous QHS     Continuous Infusions:  PRN Meds:acetaminophen, albuterol-ipratropium, aluminum-magnesium hydroxide-simethicone, glucagon (human recombinant), glucose, glucose, insulin aspart U-100, melatonin, naloxone, ondansetron, polyethylene glycol, simethicone, sodium chloride 0.9%, Pharmacy to dose Vancomycin consult **AND** vancomycin - pharmacy to dose    Review of patient's allergies indicates:  No Known Allergies     Past Medical History:   Diagnosis Date    Diabetes mellitus     Gastroparesis     Hypertension     Stroke      Past Surgical History:   Procedure Laterality Date    CHOLECYSTECTOMY         Family History       Problem Relation (Age of Onset)    No Known Problems Mother, Father          Tobacco Use    Smoking status: Never    Smokeless tobacco: Never   Substance and Sexual Activity    Alcohol use: No    Drug use: No    Sexual activity: Yes     Partners: Female     Review of Systems   Constitutional:  Positive for activity change and appetite change.   HENT:  Negative for congestion and hearing loss.    Respiratory:  Negative for chest tightness.    Cardiovascular:  Positive for leg swelling. Negative for palpitations.   Gastrointestinal:  Positive for nausea and vomiting. Negative for abdominal pain and diarrhea.   Musculoskeletal:  Positive for arthralgias and gait problem. Negative for myalgias.   Skin:  Positive for color change and wound.   Neurological:  Positive for weakness and numbness. Negative for seizures and speech difficulty.   Psychiatric/Behavioral:  Negative for decreased concentration and dysphoric mood.    Objective:     Vital Signs (Most Recent):  Temp: 99 °F (37.2 °C) (09/14/22 2105)  Pulse: 100 (09/14/22 2105)  Resp: 20 (09/14/22 2105)  BP: (!) 179/88 (09/14/22 2105)  SpO2: (!) 93 %  (09/14/22 2105)   Vital Signs (24h Range):  Temp:  [97.1 °F (36.2 °C)-99 °F (37.2 °C)] 99 °F (37.2 °C)  Pulse:  [] 100  Resp:  [16-20] 20  SpO2:  [93 %-99 %] 93 %  BP: (140-179)/(88-97) 179/88     Weight: 106.6 kg (235 lb)  Body mass index is 30.17 kg/m².    Foot Exam  CONSTITUTIONAL:  Patient is awake, alert, and oriented to person, place, and time.  Patient is well groomed with normal appearance.  No activity change.  No appetite change.    EYES:  Pupils are equal and reactive to light.  No icterus    ENT:  Mucous membranes are moist.  Dentition intact.    PULMONARY:  Breathing is nonlabored.  No wheezing or rales    VASCULAR:  The right dorsalis pedis pulse 2/4 and the right posterior tibial pulse 1/4.  The left dorsalis pedis pulse 2/4 and posterior tibial pulse on the left is 1/4.  Capillary refill is intact.  Pedal hair growth decreased.     NEUROLOGICAL:  Protective sensation is not intact to the right left foot.  Vibratory sensation is diminished.  Proprioception is intact.  Sharp/dull is reduced.    DERMATOLOGICAL:  Skin is erythematous with underlying fluctuance most notably at the medial aspect of the right foot.  Increased swelling to the right lower extremity.  Increase in warmth.  Charcot ulceration present to the plantar aspect of the foot.  Ulcer measures approximately 1.5 cm circumferentially and limited to the fat layer.  Does not appear to probe to bone plantarly.              MUSCULOSKELETAL:  History of Charcot neuro arthropathy of the right foot with prominent navicular tuberosity plantarly.      Laboratory:  A1C: No results for input(s): HGBA1C in the last 4320 hours.  ABGs: No results for input(s): PH, PCO2, HCO3, POCSATURATED, BE in the last 168 hours.  Blood Cultures: No results for input(s): LABBLOO in the last 48 hours.  BMP:   Recent Labs   Lab 09/14/22  1630   *   *   K 3.7      CO2 21*   BUN 16   CREATININE 1.7*   CALCIUM 8.3*     Cardiac Markers: No results  for input(s): CKMB, TROPONINT, MYOGLOBIN in the last 168 hours.  CBC:   Recent Labs   Lab 09/14/22  1629   WBC 21.51*   RBC 3.64*   HGB 10.6*   HCT 32.5*      MCV 89   MCH 29.1   MCHC 32.6     CMP:   Recent Labs   Lab 09/14/22  1630   *   CALCIUM 8.3*   ALBUMIN 1.9*   PROT 6.9   *   K 3.7   CO2 21*      BUN 16   CREATININE 1.7*   ALKPHOS 98   ALT 18   AST 22   BILITOT 0.8     CRP:   Recent Labs   Lab 09/14/22  1630   .6*     ESR:   Recent Labs   Lab 09/14/22  1630   SEDRATE 120*     Wound Cultures: No results for input(s): LABAERO in the last 4320 hours.  Microbiology Results (last 7 days)       Procedure Component Value Units Date/Time    Aerobic culture [[id number]]     Order Status: No result Specimen: Abscess from Foot, Right     Blood culture [[id number]] Collected: 09/14/22 1918    Order Status: Sent Specimen: Blood from Peripheral, Right Arm Updated: 09/14/22 1918    Narrative:      Collection has been rescheduled by ADM1 at 09/14/2022 17:27 Reason:   Talk With Nurse Annie Will Come Back In 30 for Second Set  Collection has been rescheduled by ADM1 at 09/14/2022 17:27 Reason:   Talk With Nurse Annie Will Come Back In 30 for Second Set    Culture, MRSA [[id number]] Collected: 09/14/22 1719    Order Status: Sent Specimen: MRSA source from Nares, Left Updated: 09/14/22 1751    Blood culture [[id number]] Collected: 09/14/22 1726    Order Status: Sent Specimen: Blood from Peripheral, Right Arm Updated: 09/14/22 1727          Specimen (24h ago, onward)      None          No results for input(s): COLORU, CLARITYU, SPECGRAV, PHUR, PROTEINUA, GLUCOSEU, BILIRUBINCON, BLOODU, WBCU, RBCU, BACTERIA, MUCUS, NITRITE, LEUKOCYTESUR, UROBILINOGEN, HYALINECASTS in the last 168 hours.    Diagnostic Results:  MRI taken at outside facility is unable to be viewed as it is not on the CD disc provided.  Arterial duplex is present in patient's CD from outside hospital.    Clinical Findings:  Cellulitis,  Charcot neuro arthropathy with underlying abscess.      All labs and imaging were reviewed in detail with the patient in the room.    Medical history and chart was thoroughly reviewed.    Plan discussed with attending provider

## 2022-09-15 NOTE — OP NOTE
[facility]  Podiatric Medicine & Surgery  Operative Report    SUMMARY     Date of Procedure: 9/15/2022    Surgeon(s) and Role: Surgeon(s) and Role:     * Tatyana Siddiqui DPM - Primary    Pre-Operative Diagnosis: Pre-Op Diagnosis Codes:     * Diabetic foot ulcer [E11.621, L97.509]     * Abscess of foot without toes, right [L02.611]     * Charcot's joint of foot, right [M14.671]     * Diabetic ulcer of other part of right foot associated with diabetes mellitus due to underlying condition, unspecified ulcer stage [E08.621, L97.519]    Post-Operative Diagnosis: Post-Op Diagnosis Codes:     * Diabetic foot ulcer [E11.621, L97.509]     * Abscess of foot without toes, right [L02.611]     * Charcot's joint of foot, right [M14.671]     * Diabetic ulcer of other part of right foot associated with diabetes mellitus due to underlying condition, unspecified ulcer stage [E08.621, L97.519]    Planned Procedure: Procedure(s):  INCISION AND DRAINAGE, FOOT  DEBRIDEMENT, FOOT  BIOPSY, BONE    Technical Procedures Used:   1. Incision and drainage of right foot   2. Bone biopsy, right medial cuneiform   3. Debridement of ulceration right plantar foot    Anesthesia: Local MAC    Hemostasis: none    Estimated Blood Loss (EBL): Minimal    Drains:  Nu Gauze packing    Specimens:  Deep wound swab for anaerobic and aerobic.  Medial cuneiform bone biopsy for microbiology and path    Implants: * No implants in log *    Complications: * No complications entered in OR log *      Description of the Findings of the Procedure: The patient was seen in the Holding Room. The risks, benefits, complications, treatment options, and expected outcomes were discussed with the patient. The risks and potential complications of their problem and purposed treatment include but are not limited to infection, nerve injury, vascular injury, nonunion/malunion/delayed union of the surgical site, persistent pain, potential skin necrosis,  deep vein thrombosis, possible pulmonary embolus, complications of the anesthetics and failure of the implant.  The patient concurred with the proposed plan, giving informed consent. The patient is aware that the procedure may be a part of a staged collection of procedures for definitive cure and/or alleviation of symptoms. The site of surgery properly noted/marked. Preoperative intravenous antibiotics are hanging at bedside, and are currently being administered via the heparin lock. The patient was taken to Operating Suite.    Once in the operative suite, the patient is transferred onto the operative table in the supine position. A TIME-OUT is taken to identify the proper patient, procedure to be performed, and laterality.  The patient is properly positioned on the operating room table for ease of dissection and for any ancillary imaging.  Nursing and ancillary OR staff prepared the patient for the procedure. The patient is adequately sedated by the Attending Anesthesiologist and/or covering CRNA.  Following this, a preoperative regional/local block was given to the right foot around plan incision line. Next, the operative limb was rendered sterile using betadine/chlorhexidine paint and scrub. Another TIME-OUT is taken as per protocol to identify the proper patient, correct extremity, and procedure to be performed. Once this coincided with all members of the team, the extremity was scrubbed, prepped, and draped in a sterile aseptic fashion.    Incision was made to the medial aspect of the foot along the previous stab incision.  Incision measured approximately 5 cm in length from dorsal distal.  A significant amount of purulence was expressed.  Approximately 50-60 cc of purulence was expressed in total.  Abscess did measure approximately 8 cm circumferentially to the entire dorsal aspect of the foot progressing proximally along the extensor tendon sheath inferior to the ankle joint.  Abscess did also extend  inferiorly to a plantar foot ulceration measuring 13 x 15 mm.Deep cultures were taken and passed to the back table for specimen.  The area was then flushed with 3000 ml of normal saline to rid the foot of any remaining purulent material.      It was noted that the medial cuneiform was soft on palpation with Lindsay elevator.  A Jamshidi needle was then introduced into the medial cuneiform specimen was taken for microbiology and pathology.    Ulceration present to the plantar aspect the medial foot measuring 13 x 15 mm was sharp debrided from the superficial thickened hyperkeratotic tissue surrounding the ulceration.  The dermal, epidermal, and subcutaneous tissue was involved in sharp debridement with a sterile 15 blade.  Upon debridement, the post debridement measurements were 20 x 22 mm with a depth of 0.3 cm.  The wound base was mixed fibrogranular.    Nu Gauze packing was placed in the tissue void to the dorsal and medial aspect of the foot and ankle.  The foot was then preoperative injected with an additional 20 cc of 0.5% Marcaine plain. Fluffs, ABDs, soft dressings were applied.    The anesthesia is weaned. There were no complications to this procedure. Any final necessary imaging to be performed in the PACU if it was not performed here in the OR suite.  The patient is transferred to the [facility] bed/stretcher, AAOx3. The patient is transferred to the PACU.    Condition: stable    Disposition: PACU - hemodynamically stable.    Attestation: I performed the procedure.

## 2022-09-15 NOTE — TRANSFER OF CARE
"Anesthesia Transfer of Care Note    Patient: Henry Fernandez    Procedure(s) Performed: Procedure(s) (LRB):  INCISION AND DRAINAGE, FOOT (Right)  DEBRIDEMENT, FOOT (Right)  BIOPSY, BONE (Right)    Patient location: PACU    Anesthesia Type: general and MAC    Transport from OR: Transported from OR on room air with adequate spontaneous ventilation    Post pain: adequate analgesia    Post assessment: no apparent anesthetic complications    Post vital signs: stable    Level of consciousness: responds to stimulation    Nausea/Vomiting: no nausea/vomiting    Complications: none    Transfer of care protocol was followed      Last vitals:   Visit Vitals  BP (!) 179/82 (Patient Position: Lying)   Pulse 89   Temp 36.9 °C (98.5 °F) (Oral)   Resp 17   Ht 6' 2" (1.88 m)   Wt 103.3 kg (227 lb 11.8 oz)   SpO2 97%   BMI 29.24 kg/m²     "

## 2022-09-15 NOTE — PROGRESS NOTES
[facility])  [facility]  Progress Note    Patient Name: Henry Fernandez  MRN: [id number]  Patient Class: IP- Inpatient   Admission Date: 9/14/2022  Length of Stay: 1 days  Attending Physician: Evens Mccauley, *  Primary Care Provider: No Maher MD        Subjective:     Principal Problem:Diabetic ulcer of right foot        HPI:  Mr Fernandez is a 52 year old male with PMHx of HTN, Stroke, DM, HLD and gastroparesis and cholecystectomy who presents to [facility] from [facility] for further treatment of Right diabetic foot wound. He ws transferred for Podiatry and Infectious Disease services. Denies associated symptoms of chest pain, shortness of breath, fever or chills. According to [facility]  MD, Blood cultures at previous hospital positive for Staph schleiferi. He was being treated with Rocephin and Vancomycin.  MRI of the RLE showed an 8.4cm dorsal fluid collection, with a possibilty of osteo. Patient is a full code, sighficant other and mother are surrogate Decision maker. Patient is being admitted under the care of [facility],.       Overview/Hospital Course:  Patient is a 52 year old male who presented to [facility] from [facility] for Podiatry and Infectious Disease Services. Podiatry following, patient under I & D at bedside last night. He was taken for surgery today and underwent I & D, Debridement and bone biopsy. Post procedure, patient doing well, vital signs stable. Will continue IV antibiotics, possible wound vac in 1-2 days.             Interval History: S/P  I & D, Debridement and bone biopsy today. Will follow.     Review of Systems   Constitutional:  Positive for activity change and chills. Negative for diaphoresis, fatigue and fever.   HENT:  Negative for congestion, ear discharge, rhinorrhea, sinus pressure, sore throat and trouble swallowing.    Eyes:  Negative for discharge and visual disturbance.   Respiratory:   Negative for apnea, cough, choking, chest tightness, shortness of breath, wheezing and stridor.    Cardiovascular:  Positive for leg swelling (right leg swelling). Negative for chest pain and palpitations.   Gastrointestinal:  Negative for abdominal distention, abdominal pain, diarrhea, nausea and vomiting.   Endocrine: Negative for cold intolerance and heat intolerance.   Genitourinary:  Negative for dysuria, frequency and hematuria.   Musculoskeletal:  Negative for arthralgias, back pain, myalgias and neck pain.        Left side weakness    Skin:  Positive for wound (right foot diabetic wound). Negative for pallor and rash.   Neurological:  Positive for weakness. Negative for dizziness, seizures, syncope and headaches.   Psychiatric/Behavioral:  Negative for agitation, confusion and sleep disturbance.    Objective:     Vital Signs (Most Recent):  Temp: 98 °F (36.7 °C) (09/15/22 1658)  Pulse: 109 (09/15/22 1658)  Resp: 18 (09/15/22 1658)  BP: (!) 168/93 (09/15/22 1658)  SpO2: (!) 94 % (09/15/22 1658)   Vital Signs (24h Range):  Temp:  [97 °F (36.1 °C)-99 °F (37.2 °C)] 98 °F (36.7 °C)  Pulse:  [] 109  Resp:  [15-20] 18  SpO2:  [91 %-99 %] 94 %  BP: (113-179)/(67-93) 168/93     Weight: 103.3 kg (227 lb 11.8 oz)  Body mass index is 29.24 kg/m².    Intake/Output Summary (Last 24 hours) at 9/15/2022 1719  Last data filed at 9/15/2022 1444  Gross per 24 hour   Intake 749.65 ml   Output 3500 ml   Net -2750.35 ml      Physical Exam  Vitals and nursing note reviewed.   Eyes:      General:         Right eye: No discharge.         Left eye: No discharge.   Cardiovascular:      Heart sounds: No murmur heard.    No friction rub. No gallop.   Pulmonary:      Effort: No respiratory distress.      Breath sounds: No stridor. No wheezing or rhonchi.   Chest:      Chest wall: No tenderness.   Abdominal:      General: There is no distension.      Palpations: There is no mass.      Tenderness: There is no abdominal tenderness.  There is no right CVA tenderness, left CVA tenderness, guarding or rebound.      Hernia: No hernia is present.   Musculoskeletal:         General: No swelling, tenderness, deformity or signs of injury.      Right lower le+ Edema present.      Left lower leg: No edema.      Comments: Rt foot with kerlix dressing    Skin:     Coloration: Skin is not jaundiced or pale.      Findings: No bruising, erythema, lesion or rash.       Significant Labs: All pertinent labs within the past 24 hours have been reviewed.  CBC:   Recent Labs   Lab 22  1629   WBC 21.51*   HGB 10.6*   HCT 32.5*        CMP:   Recent Labs   Lab 22  1630   *   K 3.7      CO2 21*   *   BUN 16   CREATININE 1.7*   CALCIUM 8.3*   PROT 6.9   ALBUMIN 1.9*   BILITOT 0.8   ALKPHOS 98   AST 22   ALT 18   ANIONGAP 12                 Assessment/Plan:      * Diabetic ulcer of right foot with abscess   Patient's FSGs are uncontrolled due to hyperglycemia on current medication regimen.  Last A1c reviewed-   Lab Results   Component Value Date    HGBA1C 9.5 (H) 2022     Most recent fingerstick glucose reviewed-   Recent Labs   Lab 22  2108 09/15/22  0605 09/15/22  1303   POCTGLUCOSE 303* 193* 165*     Current correctional scale  High  Maintain anti-hyperglycemic dose as follows-   Antihyperglycemics (From admission, onward)    Start     Stop Route Frequency Ordered    22 2100  insulin detemir U-100 pen 25 Units         -- SubQ Nightly 22 1758  insulin aspart U-100 pen 1-10 Units         -- SubQ Before meals & nightly PRN 22 1658        Hold Oral hypoglycemics while patient is in the hospital.    Consult Podiatry   Arterial ultrasound of lower extremities     Bacteremia  Blood culture at previous hospital positive for Staph Schleiferi   Repeat Blood culture   Consult ID   Continue Vanco and Rocephin       9/15/2022  Repeat BC, no growth to date   Continue IV Vanco and Rocephin   ID  consulted       CKD (chronic kidney disease) stage 3, GFR 30-59 ml/min    Review of some previous labs, looks like renal function around baseline   Will start gentle hydration and assess response     Charcot's joint of foot, right  Podiatry following       Abscess of foot without toes, right  9/15   S/P- I & D, Debridement and bone biopsy today   Monitor wound cultures   Possible wound vac in 1-2 days   Continue IV antibiotic  ID consulted          History of stroke  History of Stroke with  Left sided weakness   Continue Statin   Lipid panel   Hold Plavix for possible surgery     Hypertension    Continue home meds   Hydralazine IV PRN     Diabetes mellitus  Patient's FSGs are uncontrolled due to hyperglycemia on current medication regimen.  Last A1c reviewed-   Lab Results   Component Value Date    HGBA1C 9.5 (H) 09/14/2022     Most recent fingerstick glucose reviewed-   Recent Labs   Lab 09/14/22  2108 09/15/22  0605 09/15/22  1303   POCTGLUCOSE 303* 193* 165*     Current correctional scale  Medium  Maintain anti-hyperglycemic dose as follows-   Antihyperglycemics (From admission, onward)    Start     Stop Route Frequency Ordered    09/14/22 2100  insulin detemir U-100 pen 25 Units         -- SubQ Nightly 09/14/22 2015 09/14/22 1758  insulin aspart U-100 pen 1-10 Units         -- SubQ Before meals & nightly PRN 09/14/22 1658        Hold Oral hypoglycemics while patient is in the hospital.    Diabetic gastroparesis  Patient's FSGs are uncontrolled due to hyperglycemia on current medication regimen.  Last A1c reviewed-   Lab Results   Component Value Date    HGBA1C 9.5 (H) 09/14/2022     Most recent fingerstick glucose reviewed-   Recent Labs   Lab 09/14/22  2108 09/15/22  0605 09/15/22  1303   POCTGLUCOSE 303* 193* 165*     Current correctional scale  Medium  Maintain anti-hyperglycemic dose as follows-   Antihyperglycemics (From admission, onward)    Start     Stop Route Frequency Ordered    09/14/22 2100   insulin detemir U-100 pen 25 Units         -- SubQ Nightly 09/14/22 2015 09/14/22 1758  insulin aspart U-100 pen 1-10 Units         -- SubQ Before meals & nightly PRN 09/14/22 1658        Hold Oral hypoglycemics while patient is in the hospital.    Antemetic as needed       VTE Risk Mitigation (From admission, onward)         Ordered     IP VTE HIGH RISK PATIENT  Once         09/14/22 1652     Place sequential compression device  Until discontinued         09/14/22 1652                Discharge Planning   ZECHARIAH:      Code Status: Full Code   Is the patient medically ready for discharge?:     Reason for patient still in hospital (select all that apply): Patient trending condition and Treatment                     Wero Alfonso NP  Department of Hospital Medicine   [facility] Tele[facility] ([facility])

## 2022-09-15 NOTE — PT/OT/SLP PROGRESS
Physical Therapy      Patient Name:  Henry Fernandez   MRN:  [id number]    LYNNE BLOOM INITIATED THIS AM VIA CHART REVIEW, PT AWAY FOR SURGERY, WILL ASSESS PT NEXT VISIT    Danette Field, PT  9/15/2022  1126

## 2022-09-15 NOTE — PROCEDURES
"Henry Fernandez is a 52 y.o. male patient.    Temp: 99 °F (37.2 °C) (09/14/22 2105)  Pulse: 100 (09/14/22 2105)  Resp: 20 (09/14/22 2105)  BP: (!) 179/88 (09/14/22 2105)  SpO2: (!) 93 % (09/14/22 2105)  Weight: 106.6 kg (235 lb) (09/14/22 1300)  Height: 6' 2" (188 cm) (09/14/22 1300)       Incision and Drainage    Date/Time: 9/14/2022 9:21 PM  Location procedure was performed: [facility]  Performed by: Tatyana Siddiqui DPM  Authorized by: Tatyana Siddiqui DPM   Pre-operative diagnosis: Abscess right foot  Post-operative diagnosis: Abscess right foot, Charcot arthropathy, Charcot ulceration right foot. Cellulitis right foot  Consent Done: Yes  Consent: Verbal consent obtained.  Risks and benefits: risks, benefits and alternatives were discussed  Consent given by: patient  Patient understanding: patient states understanding of the procedure being performed  Relevant documents: relevant documents present and verified  Test results: test results available and properly labeled  Site marked: the operative site was marked  Imaging studies: imaging studies not available (MRI was unable to be found in patient chart via CD or hardcopy)  Patient identity confirmed: name, MRN and verbally with patient  Time out: Immediately prior to procedure a "time out" was called to verify the correct patient, procedure, equipment, support staff and site/side marked as required.  Type: abscess  Body area: lower extremity  Location details: right foot    Patient sedated: no  Description of findings: 20 cc of purulent material expressed and decompressed. Cultures taken, Wound packed   Scalpel size: 11  Incision type: single straight  Complexity: simple  Drainage: pus  Drainage amount: copious  Wound treatment: wound left open  Packing material: 1/4 in gauze  Technical procedures used: incision and drainage right foot  Complications: No  Estimated blood loss (mL): 0  Specimens: Yes  Implants: No  Patient tolerance: Patient tolerated the " procedure well with no immediate complications        9/14/2022

## 2022-09-15 NOTE — CONSULTS
[facility] ([facility])  Podiatry  Consult Note    Patient Name: Henry Fernandez  MRN: [id number]  Admission Date: 9/14/2022  Hospital Length of Stay: 0 days  Attending Physician: Wyatt العراقي MD  Primary Care Provider: No Maher MD     Inpatient consult to Podiatry  Consult performed by: Tatyana Siddiqui DPM  Consult ordered by: Wero Alfonso NP  Reason for consult: Abscess right foot.  Charcot versus osteo  Assessment/Recommendations:     Plan for incision drainage of right foot abscess on 09/15/2022.  Please assess patient's CD record as no MRI was able to be visualized.  No MRI report is in the patient's chart.  This would be helpful to determine which bone/osseous structures would be needed to biopsy to assess for underlying pathology.  He is not interested in surgical amputation at this time so would likely recommend 6-8 weeks of IV antibiotics based on abscess cultures taken from 09/14/2022.    Upon discharge, he is to continue to follow-up at [facility].  Likely will need a wound VAC prior to discharge as the wound will be packed open        Subjective:     History of Present Illness:  Mr Feranndez is a 52 year old male with PMHx of HTN, Stroke, DM, HLD and gastroparesis and cholecystectomy who [place] to [facility] for further treatment of Right diabetic foot wound. He was seeing [facility] wound care for a charcot ulceration to the plantar aspect of the right foot which she has been managing.  States that he had subsequently developed increased pain and swelling to the foot.  Per his records, there has been discussion of a Streptococcus infection to his knee.  No imaging of the knee appears to be performed.  He ws transferred for Podiatry and Infectious Disease services with a disc for his MRI and blood flow scan.  Unfortunately, the CD provided with the patient does not show the results of his MRI but rather only his arterial  duplex.    According to Hospital Medicine  MD, Blood cultures at previous hospital positive for Staph schleiferi. He was being treated with Rocephin and Vancomycin.  MRI of the RLE showed an 8.4cm dorsal fluid collection, with a possibilty of osteomyelitis.  Unfortunately, the pictures/scans are not available.  Also, the written report is not available in the patient's chart or hard copy medical records provided with the patient on transfer.          Scheduled Meds:   amLODIPine  10 mg Oral Daily    [START ON 9/15/2022] atorvastatin  20 mg Oral Daily    cefTRIAXone (ROCEPHIN) IVPB  2 g Intravenous Q24H    insulin detemir U-100  25 Units Subcutaneous QHS     Continuous Infusions:  PRN Meds:acetaminophen, albuterol-ipratropium, aluminum-magnesium hydroxide-simethicone, glucagon (human recombinant), glucose, glucose, insulin aspart U-100, melatonin, naloxone, ondansetron, polyethylene glycol, simethicone, sodium chloride 0.9%, Pharmacy to dose Vancomycin consult **AND** vancomycin - pharmacy to dose    Review of patient's allergies indicates:  No Known Allergies     Past Medical History:   Diagnosis Date    Diabetes mellitus     Gastroparesis     Hypertension     Stroke      Past Surgical History:   Procedure Laterality Date    CHOLECYSTECTOMY         Family History       Problem Relation (Age of Onset)    No Known Problems Mother, Father          Tobacco Use    Smoking status: Never    Smokeless tobacco: Never   Substance and Sexual Activity    Alcohol use: No    Drug use: No    Sexual activity: Yes     Partners: Female     Review of Systems   Constitutional:  Positive for activity change and appetite change.   HENT:  Negative for congestion and hearing loss.    Respiratory:  Negative for chest tightness.    Cardiovascular:  Positive for leg swelling. Negative for palpitations.   Gastrointestinal:  Positive for nausea and vomiting. Negative for abdominal pain and diarrhea.   Musculoskeletal:   Positive for arthralgias and gait problem. Negative for myalgias.   Skin:  Positive for color change and wound.   Neurological:  Positive for weakness and numbness. Negative for seizures and speech difficulty.   Psychiatric/Behavioral:  Negative for decreased concentration and dysphoric mood.    Objective:     Vital Signs (Most Recent):  Temp: 99 °F (37.2 °C) (09/14/22 2105)  Pulse: 100 (09/14/22 2105)  Resp: 20 (09/14/22 2105)  BP: (!) 179/88 (09/14/22 2105)  SpO2: (!) 93 % (09/14/22 2105)   Vital Signs (24h Range):  Temp:  [97.1 °F (36.2 °C)-99 °F (37.2 °C)] 99 °F (37.2 °C)  Pulse:  [] 100  Resp:  [16-20] 20  SpO2:  [93 %-99 %] 93 %  BP: (140-179)/(88-97) 179/88     Weight: 106.6 kg (235 lb)  Body mass index is 30.17 kg/m².    Foot Exam  CONSTITUTIONAL:  Patient is awake, alert, and oriented to person, place, and time.  Patient is well groomed with normal appearance.  No activity change.  No appetite change.    EYES:  Pupils are equal and reactive to light.  No icterus    ENT:  Mucous membranes are moist.  Dentition intact.    PULMONARY:  Breathing is nonlabored.  No wheezing or rales    VASCULAR:  The right dorsalis pedis pulse 2/4 and the right posterior tibial pulse 1/4.  The left dorsalis pedis pulse 2/4 and posterior tibial pulse on the left is 1/4.  Capillary refill is intact.  Pedal hair growth decreased.     NEUROLOGICAL:  Protective sensation is not intact to the right left foot.  Vibratory sensation is diminished.  Proprioception is intact.  Sharp/dull is reduced.    DERMATOLOGICAL:  Skin is erythematous with underlying fluctuance most notably at the medial aspect of the right foot.  Increased swelling to the right lower extremity.  Increase in warmth.  Charcot ulceration present to the plantar aspect of the foot.  Ulcer measures approximately 1.5 cm circumferentially and limited to the fat layer.  Does not appear to probe to bone plantarly.              MUSCULOSKELETAL:  History of Charcot neuro  arthropathy of the right foot with prominent navicular tuberosity plantarly.      Laboratory:  A1C: No results for input(s): HGBA1C in the last 4320 hours.  ABGs: No results for input(s): PH, PCO2, HCO3, POCSATURATED, BE in the last 168 hours.  Blood Cultures: No results for input(s): LABBLOO in the last 48 hours.  BMP:   Recent Labs   Lab 09/14/22  1630   *   *   K 3.7      CO2 21*   BUN 16   CREATININE 1.7*   CALCIUM 8.3*     Cardiac Markers: No results for input(s): CKMB, TROPONINT, MYOGLOBIN in the last 168 hours.  CBC:   Recent Labs   Lab 09/14/22  1629   WBC 21.51*   RBC 3.64*   HGB 10.6*   HCT 32.5*      MCV 89   MCH 29.1   MCHC 32.6     CMP:   Recent Labs   Lab 09/14/22  1630   *   CALCIUM 8.3*   ALBUMIN 1.9*   PROT 6.9   *   K 3.7   CO2 21*      BUN 16   CREATININE 1.7*   ALKPHOS 98   ALT 18   AST 22   BILITOT 0.8     CRP:   Recent Labs   Lab 09/14/22  1630   .6*     ESR:   Recent Labs   Lab 09/14/22  1630   SEDRATE 120*     Wound Cultures: No results for input(s): LABAERO in the last 4320 hours.  Microbiology Results (last 7 days)       Procedure Component Value Units Date/Time    Aerobic culture [[id number]]     Order Status: No result Specimen: Abscess from Foot, Right     Blood culture [[id number]] Collected: 09/14/22 1918    Order Status: Sent Specimen: Blood from Peripheral, Right Arm Updated: 09/14/22 1918    Narrative:      Collection has been rescheduled by ADM1 at 09/14/2022 17:27 Reason:   Talk With Nurse Annie Will Come Back In 30 for Second Set  Collection has been rescheduled by ADM1 at 09/14/2022 17:27 Reason:   Talk With Nurse Annie Will Come Back In 30 for Second Set    Culture, MRSA [[id number]] Collected: 09/14/22 1719    Order Status: Sent Specimen: MRSA source from Nares, Left Updated: 09/14/22 1751    Blood culture [[id number]] Collected: 09/14/22 1726    Order Status: Sent Specimen: Blood from Peripheral, Right Arm Updated: 09/14/22  1727          Specimen (24h ago, onward)      None          No results for input(s): COLORU, CLARITYU, SPECGRAV, PHUR, PROTEINUA, GLUCOSEU, BILIRUBINCON, BLOODU, WBCU, RBCU, BACTERIA, MUCUS, NITRITE, LEUKOCYTESUR, UROBILINOGEN, HYALINECASTS in the last 168 hours.    Diagnostic Results:  MRI taken at outside facility is unable to be viewed as it is not on the CD disc provided.  Arterial duplex is present in patient's CD from outside hospital.    Clinical Findings:  Cellulitis, Charcot neuro arthropathy with underlying abscess.      All labs and imaging were reviewed in detail with the patient in the room.    Medical history and chart was thoroughly reviewed.    Plan discussed with attending provider           Assessment/Plan:     * Diabetic ulcer of right foot with abscess   Ulceration to the plantar aspect foot associated with Charcot neuro arthropathy.  There is no evidence of probe to bone at this location.  MRI which is noted to be have been performed in outside facility is unable to be assessed, viewed, or the report read as this is not in the patient's current medical record or hard copy of the patient's chart provided with him on transfer.    Did discussed performing bedside I and D of the right foot to obtain wound cultures and determine pathology specimen causing abscess.  Patient was agreeable and did consent to a bedside I and D to decompress the foot.  Approximately 20 cc of purulent material was expressed.  Culture was obtained.  The wound was packed open with Nu Gauze packing.  Will plan surgical formal incision drainage on 09/15/2022 as patient is not currently NPO    The procedure of (incision drainage of right foot, possible bone biopsy) was thoroughly explained to the patient. Its necessity and/or purpose and the implications therein were outlined, including any pertinent advantages and/or disadvantages, and possible complications, if any. Possible complications include recurrence of pathology  and/or deformity, infection (cellulitis, drainage, purulence, malodor, etc...), pain, numbness, neuritis, edema, burning, loss of function, need for further surgery, possible need for removal of any implanted hardware, soft tissue contracture and/or scarring, etc... No guarantees were given and/or implied. Post-operative expectations and weightbearing protocol is thoroughly explained the patient, who acknowledges understanding.     Bacteremia  Infectious disease consulted.  Would appreciate ID opinion on underlying osteomyelitis versus Charcot arthropathy and bone biopsy.  Patient reports he is not interested in surgical amputation on 09/15/2022.  Likely will perform incision and drainage to flush the area as the appropriate wound culture was taken on 09/14/2022 of the abscess.    Diabetes mellitus  Discussed the importance of appropriate A1c as patient does have uncontrolled diabetes mellitus with last A1c of 13.5.  This does pose risk for surgical healing complications.        Thank you for your consult  NPO at midnight  Please obtain MRI report to determine which osseous structures showed increased marrow edema concerning for possible osteo versus Charcot arthropathy.    Tatyana Siddiqui DPM  Podiatry  [facility] - Telemetry ([facility])

## 2022-09-15 NOTE — ASSESSMENT & PLAN NOTE
Patient's FSGs are uncontrolled due to hyperglycemia on current medication regimen.  Last A1c reviewed-   Lab Results   Component Value Date    HGBA1C 9.5 (H) 09/14/2022     Most recent fingerstick glucose reviewed-   Recent Labs   Lab 09/14/22  2108 09/15/22  0605 09/15/22  1303   POCTGLUCOSE 303* 193* 165*     Current correctional scale  High  Maintain anti-hyperglycemic dose as follows-   Antihyperglycemics (From admission, onward)    Start     Stop Route Frequency Ordered    09/14/22 2100  insulin detemir U-100 pen 25 Units         -- SubQ Nightly 09/14/22 2015 09/14/22 1758  insulin aspart U-100 pen 1-10 Units         -- SubQ Before meals & nightly PRN 09/14/22 1658        Hold Oral hypoglycemics while patient is in the hospital.    Consult Podiatry   Arterial ultrasound of lower extremities

## 2022-09-16 LAB
ALBUMIN SERPL BCP-MCNC: 1.7 G/DL (ref 3.5–5.2)
ALP SERPL-CCNC: 109 U/L (ref 55–135)
ALT SERPL W/O P-5'-P-CCNC: 23 U/L (ref 10–44)
ANION GAP SERPL CALC-SCNC: 11 MMOL/L (ref 8–16)
AST SERPL-CCNC: 25 U/L (ref 10–40)
BASOPHILS # BLD AUTO: 0.11 K/UL (ref 0–0.2)
BASOPHILS NFR BLD: 0.4 % (ref 0–1.9)
BILIRUB SERPL-MCNC: 0.8 MG/DL (ref 0.1–1)
BUN SERPL-MCNC: 14 MG/DL (ref 6–20)
CALCIUM SERPL-MCNC: 7.5 MG/DL (ref 8.7–10.5)
CHLORIDE SERPL-SCNC: 103 MMOL/L (ref 95–110)
CO2 SERPL-SCNC: 20 MMOL/L (ref 23–29)
CREAT SERPL-MCNC: 1.5 MG/DL (ref 0.5–1.4)
DIFFERENTIAL METHOD: ABNORMAL
EOSINOPHIL # BLD AUTO: 0.2 K/UL (ref 0–0.5)
EOSINOPHIL NFR BLD: 0.5 % (ref 0–8)
ERYTHROCYTE [DISTWIDTH] IN BLOOD BY AUTOMATED COUNT: 12.9 % (ref 11.5–14.5)
EST. GFR  (NO RACE VARIABLE): 56 ML/MIN/1.73 M^2
GLUCOSE SERPL-MCNC: 211 MG/DL (ref 70–110)
HCT VFR BLD AUTO: 28.8 % (ref 40–54)
HGB BLD-MCNC: 9.2 G/DL (ref 14–18)
IMM GRANULOCYTES # BLD AUTO: 1 K/UL (ref 0–0.04)
IMM GRANULOCYTES NFR BLD AUTO: 3.2 % (ref 0–0.5)
LYMPHOCYTES # BLD AUTO: 2.5 K/UL (ref 1–4.8)
LYMPHOCYTES NFR BLD: 8 % (ref 18–48)
MCH RBC QN AUTO: 29.1 PG (ref 27–31)
MCHC RBC AUTO-ENTMCNC: 31.9 G/DL (ref 32–36)
MCV RBC AUTO: 91 FL (ref 82–98)
MONOCYTES # BLD AUTO: 2 K/UL (ref 0.3–1)
MONOCYTES NFR BLD: 6.4 % (ref 4–15)
MRSA SPEC QL CULT: NORMAL
NEUTROPHILS # BLD AUTO: 25.1 K/UL (ref 1.8–7.7)
NEUTROPHILS NFR BLD: 81.5 % (ref 38–73)
NRBC BLD-RTO: 0 /100 WBC
PLATELET # BLD AUTO: 463 K/UL (ref 150–450)
PMV BLD AUTO: 8.7 FL (ref 9.2–12.9)
POCT GLUCOSE: 222 MG/DL (ref 70–110)
POCT GLUCOSE: 246 MG/DL (ref 70–110)
POCT GLUCOSE: 263 MG/DL (ref 70–110)
POCT GLUCOSE: 276 MG/DL (ref 70–110)
POTASSIUM SERPL-SCNC: 3.8 MMOL/L (ref 3.5–5.1)
PROT SERPL-MCNC: 5.6 G/DL (ref 6–8.4)
RBC # BLD AUTO: 3.16 M/UL (ref 4.6–6.2)
SODIUM SERPL-SCNC: 134 MMOL/L (ref 136–145)
VANCOMYCIN SERPL-MCNC: 10.2 UG/ML
WBC # BLD AUTO: 30.83 K/UL (ref 3.9–12.7)

## 2022-09-16 PROCEDURE — 25000003 PHARM REV CODE 250: Performed by: INTERNAL MEDICINE

## 2022-09-16 PROCEDURE — 25000003 PHARM REV CODE 250: Performed by: PODIATRIST

## 2022-09-16 PROCEDURE — 36415 COLL VENOUS BLD VENIPUNCTURE: CPT | Performed by: PODIATRIST

## 2022-09-16 PROCEDURE — 97162 PT EVAL MOD COMPLEX 30 MIN: CPT

## 2022-09-16 PROCEDURE — 21400001 HC TELEMETRY ROOM

## 2022-09-16 PROCEDURE — 80053 COMPREHEN METABOLIC PANEL: CPT | Performed by: PODIATRIST

## 2022-09-16 PROCEDURE — 36415 COLL VENOUS BLD VENIPUNCTURE: CPT | Performed by: INTERNAL MEDICINE

## 2022-09-16 PROCEDURE — 80202 ASSAY OF VANCOMYCIN: CPT | Performed by: INTERNAL MEDICINE

## 2022-09-16 PROCEDURE — 97530 THERAPEUTIC ACTIVITIES: CPT

## 2022-09-16 PROCEDURE — 85025 COMPLETE CBC W/AUTO DIFF WBC: CPT | Performed by: PODIATRIST

## 2022-09-16 PROCEDURE — 94761 N-INVAS EAR/PLS OXIMETRY MLT: CPT

## 2022-09-16 PROCEDURE — 25000003 PHARM REV CODE 250: Performed by: NURSE PRACTITIONER

## 2022-09-16 PROCEDURE — 97166 OT EVAL MOD COMPLEX 45 MIN: CPT

## 2022-09-16 PROCEDURE — 97116 GAIT TRAINING THERAPY: CPT

## 2022-09-16 PROCEDURE — 63600175 PHARM REV CODE 636 W HCPCS: Performed by: INTERNAL MEDICINE

## 2022-09-16 RX ORDER — METRONIDAZOLE 500 MG/1
500 TABLET ORAL EVERY 8 HOURS
Status: DISCONTINUED | OUTPATIENT
Start: 2022-09-16 | End: 2022-09-23 | Stop reason: HOSPADM

## 2022-09-16 RX ORDER — CEFEPIME HYDROCHLORIDE 1 G/50ML
1 INJECTION, SOLUTION INTRAVENOUS
Status: DISCONTINUED | OUTPATIENT
Start: 2022-09-16 | End: 2022-09-19

## 2022-09-16 RX ADMIN — AMLODIPINE BESYLATE 10 MG: 10 TABLET ORAL at 08:09

## 2022-09-16 RX ADMIN — METRONIDAZOLE 500 MG: 500 TABLET ORAL at 01:09

## 2022-09-16 RX ADMIN — VANCOMYCIN HYDROCHLORIDE 1500 MG: 1.5 INJECTION, POWDER, LYOPHILIZED, FOR SOLUTION INTRAVENOUS at 05:09

## 2022-09-16 RX ADMIN — INSULIN DETEMIR 25 UNITS: 100 INJECTION, SOLUTION SUBCUTANEOUS at 08:09

## 2022-09-16 RX ADMIN — METRONIDAZOLE 500 MG: 500 TABLET ORAL at 08:09

## 2022-09-16 RX ADMIN — ATORVASTATIN CALCIUM 20 MG: 10 TABLET, FILM COATED ORAL at 08:09

## 2022-09-16 RX ADMIN — SODIUM CHLORIDE: 9 INJECTION, SOLUTION INTRAVENOUS at 01:09

## 2022-09-16 RX ADMIN — INSULIN ASPART 4 UNITS: 100 INJECTION, SOLUTION INTRAVENOUS; SUBCUTANEOUS at 12:09

## 2022-09-16 RX ADMIN — CEFEPIME HYDROCHLORIDE 1 G: 1 INJECTION, SOLUTION INTRAVENOUS at 08:09

## 2022-09-16 RX ADMIN — CEFEPIME HYDROCHLORIDE 1 G: 1 INJECTION, SOLUTION INTRAVENOUS at 01:09

## 2022-09-16 RX ADMIN — INSULIN ASPART 6 UNITS: 100 INJECTION, SOLUTION INTRAVENOUS; SUBCUTANEOUS at 05:09

## 2022-09-16 NOTE — PROGRESS NOTES
[facility] - Tele[facility] ([facility])  Podiatry  Progress Note    Patient Name: Henry Fernandez  MRN: [id number]  Admission Date: 9/14/2022  Hospital Length of Stay: 2 days  Attending Physician: Evens Mccauley, *  Primary Care Provider: No Maher MD     Subjective:     Interval History:  Patient reports he is doing well.  Does notice some drainage on his dressings but reports minimal pain to the right foot.    Follow-up For: Procedure(s) (LRB):  INCISION AND DRAINAGE, FOOT (Right)  DEBRIDEMENT, FOOT (Right)  BIOPSY, BONE (Right)    Post-Operative Day: 1 Day Post-Op    Scheduled Meds:   amLODIPine  10 mg Oral Daily    atorvastatin  20 mg Oral Daily    ceFEPime (MAXIPIME) IVPB  1 g Intravenous Q8H    insulin detemir U-100  25 Units Subcutaneous QHS    metroNIDAZOLE  500 mg Oral Q8H     Continuous Infusions:   sodium chloride 0.9% 75 mL/hr at 09/16/22 1302     PRN Meds:acetaminophen, albuterol-ipratropium, aluminum-magnesium hydroxide-simethicone, glucagon (human recombinant), glucose, glucose, insulin aspart U-100, melatonin, naloxone, ondansetron, polyethylene glycol, simethicone, sodium chloride 0.9%, Pharmacy to dose Vancomycin consult **AND** vancomycin - pharmacy to dose    Review of Systems  Objective:     Vital Signs (Most Recent):  Temp: 97.7 °F (36.5 °C) (09/16/22 1158)  Pulse: 91 (09/16/22 1158)  Resp: 18 (09/16/22 1158)  BP: 124/65 (09/16/22 1158)  SpO2: (!) 94 % (09/16/22 1158)   Vital Signs (24h Range):  Temp:  [97.7 °F (36.5 °C)-102.7 °F (39.3 °C)] 97.7 °F (36.5 °C)  Pulse:  [] 91  Resp:  [15-20] 18  SpO2:  [90 %-99 %] 94 %  BP: (119-175)/(60-93) 124/65     Weight: 103.3 kg (227 lb 11.8 oz)  Body mass index is 29.24 kg/m².    Foot Exam  CONSTITUTIONAL:  Patient is awake, alert, and oriented to person, place, and time.  Patient is well groomed with normal appearance.  No activity change.  No appetite change.    EYES:  Pupils are equal and reactive to light.  No icterus    ENT:  Mucous  membranes are moist.  Dentition intact.    PULMONARY:  Breathing is nonlabored.  No wheezing or rales    VASCULAR:  The right dorsalis pedis pulse 2/4 and the right posterior tibial pulse 1/4.  Capillary refill is intact.  Pedal hair growth decreased.     NEUROLOGICAL:  Protective sensation is not intact to the right left foot.  Vibratory sensation is diminished.  Proprioception is intact.  Sharp/dull is reduced.    DERMATOLOGICAL:  Incision present to the medial aspect of the right foot where large abscess was noted.  The wound does probe dorsally across the foot approximately 8 cm.  It progresses proximally to the inferior aspect of the ankle and the deep tissue layer.  The wound also wraps medially to the medial aspect of the foot and progresses to the area of previous Charcot foot ulceration.  Large pocket is noted which is packed open with Nu Gauze packing.  There is no additional purulence expressed this morning.  Packing was removed and the area was flushed with normal saline all running clear.  Skin appears to be normal without evidence of necrosis    MUSCULOSKELETAL:  Status post right I and D    Laboratory:  A1C:   Recent Labs   Lab 09/14/22  1701   HGBA1C 9.5*     ABGs: No results for input(s): PH, PCO2, HCO3, POCSATURATED, BE in the last 168 hours.  Blood Cultures:   Recent Labs   Lab 09/14/22  1726 09/14/22  1918   LABBLOO No Growth to date  No Growth to date No Growth to date  No Growth to date     Cardiac Markers: No results for input(s): CKMB, TROPONINT, MYOGLOBIN in the last 168 hours.  CBC:   Recent Labs   Lab 09/16/22  0452   WBC 30.83*   RBC 3.16*   HGB 9.2*   HCT 28.8*   *   MCV 91   MCH 29.1   MCHC 31.9*     CMP:   Recent Labs   Lab 09/16/22  0452   *   CALCIUM 7.5*   ALBUMIN 1.7*   PROT 5.6*   *   K 3.8   CO2 20*      BUN 14   CREATININE 1.5*   ALKPHOS 109   ALT 23   AST 25   BILITOT 0.8     CPS: {:LNK,LABRCNTIP[  CRP:   Recent Labs   Lab 09/14/22  1630   .6*      ESR:   Recent Labs   Lab 09/14/22  1630   SEDRATE 120*     Prealbumin: No results for input(s): PREALBUMIN in the last 48 hours.  Wound Cultures: No results for input(s): LABAERO in the last 4320 hours.  Microbiology Results (last 7 days)       Procedure Component Value Units Date/Time    Culture, MRSA [[id number]] Collected: 09/14/22 1719    Order Status: Completed Specimen: MRSA source from Nares, Left Updated: 09/16/22 0738     MRSA Surveillance Screen No MRSA isolated    Blood culture [[id number]] Collected: 09/14/22 1726    Order Status: Completed Specimen: Blood from Peripheral, Right Arm Updated: 09/16/22 0613     Blood Culture, Routine No Growth to date      No Growth to date    Narrative:      Specimen # 1    Blood culture [[id number]] Collected: 09/14/22 1918    Order Status: Completed Specimen: Blood from Peripheral, Right Arm Updated: 09/16/22 0613     Blood Culture, Routine No Growth to date      No Growth to date    Narrative:      Specimen # 2  Collection has been rescheduled by ADM1 at 09/14/2022 17:27 Reason:   Talk With Nurse Annie Will Come Back In 30 for Second Set  Collection has been rescheduled by ADM1 at 09/14/2022 17:27 Reason:   Talk With Nurse Annie Will Come Back In 30 for Second Set    Tissue culture [[id number]] Collected: 09/15/22 1246    Order Status: Completed Specimen: Tissue from Foot, Right Updated: 09/16/22 0000     Gram Stain Result Few WBC's      Rare Gram positive cocci    Narrative:      Medial Cuneiform Right Foot Tissue Culture    Culture, Anaerobe [[id number]] Collected: 09/15/22 1247    Order Status: Sent Specimen: Abscess from Foot, Right Updated: 09/15/22 2220    Aerobic culture [[id number]] Collected: 09/15/22 1247    Order Status: Sent Specimen: Abscess from Foot, Right Updated: 09/15/22 2220    Aerobic culture [[id number]]     Order Status: No result Specimen: Abscess from Foot, Right     Aerobic culture [[id number]]     Order Status: Canceled Specimen: Abscess from  Foot, Right           Specimen (24h ago, onward)      None          No results for input(s): COLORU, CLARITYU, SPECGRAV, PHUR, PROTEINUA, GLUCOSEU, BILIRUBINCON, BLOODU, WBCU, RBCU, BACTERIA, MUCUS, NITRITE, LEUKOCYTESUR, UROBILINOGEN, HYALINECASTS in the last 168 hours.  All pertinent labs reviewed within the last 24 hours.    Diagnostic Results:  I have reviewed all pertinent imaging results/findings within the past 24 hours.      All labs and imaging were reviewed in detail with the patient in the room.    Medical history and chart was thoroughly reviewed.    Plan discussed with attending provider           Assessment/Plan:     * Diabetic ulcer of right foot with abscess   Patient currently status post incision drainage of large abscess to the right foot.  Approximately 60 cc of purulent material was expressed and flushed intraoperatively.  Bone cultures taken of the medial cuneiform as the bone was noted to be somewhat soft intraoperatively.  Will follow microbiology and pathology reports for this.    Deep cultures were taken from the drainage to determine pathogen causing abscess.  Likely associated with diabetic/Charcot ulceration which has closed in trapped drainage in place.  This began to proliferate and cause significant abscess formation and bacteremia    Discussed with patient that there is hope for salvage at this point.  I will pack the wound open today and have the wound nurses evaluate the patient for possible wound VAC today or wait until the tissues have rebounded slightly to provide patient the best chance for salvage.    Antibiotic duration based on bacteremia and bone cultures taken from the medial cuneiform.  Intraoperatively, I am concerned on possible osteomyelitis and patient would likely require 6-8 weeks of IV antibiotics.  He does follow up at [facility] for his management of the Charcot wound.  This may be an option upon discharge    Charcot's joint of foot,  right  Recommend nonweightbearing on the right foot if cultures do show active Charcot versus osteomyelitis.  If patient is active Charcot, he will need to be completely nonweightbearing.  If evidence of osteomyelitis, I am concerned about pathologic fracture as there is a loss of integrity the bony fragments.  Did discuss this in detail with the patient.  Will likely require wound VAC application and also be nonweightbearing to the right foot    Abscess of foot without toes, right  Abscess resolved to the right foot.  Wound packed open    Diabetes mellitus  Discussed the importance of appropriate A1c as patient does have uncontrolled diabetes mellitus with last A1c of 13.5.  This does pose risk for surgical healing complications.        Tatyana Siddiqui DPM  Podiatry  [facility] - Telemetry ([facility])

## 2022-09-16 NOTE — PT/OT/SLP EVAL
"Physical Therapy Evaluation    Patient Name:  Henry Fernandez   MRN:  [id number]    Recommendations:     Discharge Recommendations:   (TBD)   Discharge Equipment Recommendations:  (TBD)   Barriers to discharge: None    Assessment:     Henry Fernandez is a 52 y.o. male admitted with a medical diagnosis of Diabetic ulcer of right foot.  He presents with the following impairments/functional limitations:  weakness, impaired endurance, impaired functional mobility, gait instability, impaired balance, decreased safety awareness which limits safe functional mobility.    Rehab Prognosis: Good; patient would benefit from acute skilled PT services to address these deficits and reach maximum level of function.    Recent Surgery: Procedure(s) (LRB):  INCISION AND DRAINAGE, FOOT (Right)  DEBRIDEMENT, FOOT (Right)  BIOPSY, BONE (Right) 1 Day Post-Op    Plan:     During this hospitalization, patient to be seen 3 x/week to address the identified rehab impairments via gait training, therapeutic activities, therapeutic exercises, neuromuscular re-education and progress toward the following goals:    Plan of Care Expires:  09/30/22    Subjective     Chief Complaint: diabetic ulcer of R foot with abscess  Patient/Family Comments/goals: to go home "they said I can't put any weight on it" referring to R foot  Pain/Comfort:  Pain Rating 1: 0/10    Patients cultural, spiritual, Buddhist conflicts given the current situation: no    Living Environment:  Pt lives with mother in [facility] with 2 steps at entry and B rails available.  Prior to admission, patients level of function was independent.  Equipment used at home: none.  DME owned (not currently used): rolling walker and rollator .  Upon discharge, patient will have assistance from family.    Objective:     Communicated with nurse Goyal prior to session.  Patient found sitting edge of bed with telemetry, peripheral IV  upon PT entry to room.    General Precautions: Standard, fall   Orthopedic " Precautions:N/A   Braces: AFO  Respiratory Status: Room air    Exams:  RLE ROM: WFL  RLE Strength: WFL  LLE ROM: WFL  LLE Strength: WFL    Functional Mobility:  Transfers:     Sit to Stand:  contact guard assistance with rolling walker  Stand to sit: contact guard assistance with  rolling walker  Gait: 3 steps CGA with RW, unable to maintain WB status to RLE    Therapeutic Activities and Exercises:     Educated pt on RW management and technique to promote NWB to RLE. Pt unable to maintain due to L sided weakness and AFO to L side.     AM-PAC 6 CLICK MOBILITY  Total Score:16     Patient left sitting edge of bed with all lines intact, call button in reach, and nursing notified.    GOALS:   Multidisciplinary Problems       Physical Therapy Goals          Problem: Physical Therapy    Goal Priority Disciplines Outcome Goal Variances Interventions   Physical Therapy Goal     PT, PT/OT      Description: Pt will perform bed mobility independently in order to participate in EOB activity.  Pt will perform transfers independently in order to participate in OOB activity.   Pt will ambulate 50ft CGA with LRAD while maintaining WB status (if any) in order to participate in daily tasks.    Pt will perform w/c mobility 50ft x 2 in order to safety navigate surroundings.                        History:     Past Medical History:   Diagnosis Date    Diabetes mellitus     Gastroparesis     Hypertension     Stroke        Past Surgical History:   Procedure Laterality Date    BONE BIOPSY Right 9/15/2022    Procedure: BIOPSY, BONE;  Surgeon: Tatyana Siddiqui DPM;  Location: [facility] OR;  Service: Podiatry;  Laterality: Right;    CHOLECYSTECTOMY      DEBRIDEMENT OF FOOT Right 9/15/2022    Procedure: DEBRIDEMENT, FOOT;  Surgeon: Tatyana Siddiqui DPM;  Location: [facility] OR;  Service: Podiatry;  Laterality: Right;    INCISION AND DRAINAGE FOOT Right 9/15/2022    Procedure: INCISION AND DRAINAGE, FOOT;  Surgeon: Tatyana Siddiqui DPM;  Location: [facility]  OR;  Service: Podiatry;  Laterality: Right;       Time Tracking:     PT Received On: 09/16/22  PT Start Time: 0930     PT Stop Time: 0955  PT Total Time (min): 25 min     Billable Minutes: Evaluation 15 and Gait Training 10      09/16/2022

## 2022-09-16 NOTE — ASSESSMENT & PLAN NOTE
Patient's FSGs are uncontrolled due to hyperglycemia on current medication regimen.  Last A1c reviewed-   Lab Results   Component Value Date    HGBA1C 9.5 (H) 09/14/2022     Most recent fingerstick glucose reviewed-   Recent Labs   Lab 09/15/22  1655 09/15/22  2205 09/16/22  0616 09/16/22  1157   POCTGLUCOSE 185* 241* 222* 246*     Current correctional scale  High  Maintain anti-hyperglycemic dose as follows-   Antihyperglycemics (From admission, onward)    Start     Stop Route Frequency Ordered    09/14/22 2100  insulin detemir U-100 pen 25 Units         -- SubQ Nightly 09/14/22 2015 09/14/22 1758  insulin aspart U-100 pen 1-10 Units         -- SubQ Before meals & nightly PRN 09/14/22 1658        Hold Oral hypoglycemics while patient is in the hospital.    Consult Podiatry   Arterial ultrasound of lower extremities

## 2022-09-16 NOTE — PLAN OF CARE
[facility] - Telemetry (Hospital)  Initial Discharge Assessment       Primary Care Provider: No Maher MD    Admission Diagnosis: Diabetic foot ulcer [E11.621, L97.509]    Admission Date: 9/14/2022  Expected Discharge Date:     Discharge Barriers Identified: None    Payor: [employer] MANAGED MEDICARE / Plan: [employer] MEDICARE HMO / Product Type: Capitation /     Extended Emergency Contact Information  Primary Emergency Contact: Cora Esqueda   [place]  Mobile Phone: [phone]  Relation: Mother  Secondary Emergency Contact: Angela Ta   [facility]  Home Phone: [phone]  Relation: Significant other    Discharge Plan A: Home Health         [id number] - [place], [place] - [address]  [address]  [place] [address]  Phone: [phone] Fax: [phone]      Initial Assessment (most recent)       Adult Discharge Assessment - 09/16/22 1159          Discharge Assessment    Assessment Type Discharge Planning Assessment     Confirmed/corrected address, phone number and insurance Yes     Confirmed Demographics Correct on Facesheet     Source of Information patient     Communicated ZECHARIAH with patient/caregiver Date not available/Unable to determine     Reason For Admission infection of foot     Lives With parent(s)     Facility Arrived From: home     Do you expect to return to your current living situation? Yes     Do you have help at home or someone to help you manage your care at home? Yes     Who are your caregiver(s) and their phone number(s)? Cora Esqueda, mother     Prior to hospitilization cognitive status: Alert/Oriented     Current cognitive status: Alert/Oriented     Walking or Climbing Stairs Difficulty none     Dressing/Bathing Difficulty none     Equipment Currently Used at Home cane, straight;glucometer     Readmission within 30 days? No     Patient currently being followed by outpatient case management? No     Do you currently have service(s) that help you manage  your care at home? No     Do you take prescription medications? Yes     Do you have prescription coverage? Yes     Coverage [employer]     Do you have any problems affording any of your prescribed medications? Yes     If yes, what medications? Trulicity     Is the patient taking medications as prescribed? yes     Who is going to help you get home at discharge? Cora Esqueda, mother     How do you get to doctors appointments? car, drives self;family or friend will provide     Are you on dialysis? No     Do you take coumadin? No     Discharge Plan A Home Health     DME Needed Upon Discharge  none     Discharge Plan discussed with: Patient     Discharge Barriers Identified None        Relationship/Environment    Name(s) of Who Lives With Patient Cora Esqueda, mother                   Met with patient. Patient lives with his mother is independent with adls and iadls. Discussed his discharge plan may include IV antibiotics.  He states his mother can assist with administering it.      Updated white board with 's name and number. Transitional Care Folder, Discharge Planning Begins on Admission pamphlet, [facility], Advance Directive information given to patient along with the contact information given.Instructed patient or family to call with any questions or concerns.

## 2022-09-16 NOTE — PROGRESS NOTES
[facility] - Telemetry ([facility])  [facility]  Progress Note    Patient Name: Henry Fernandez  MRN: [id number]  Patient Class: IP- Inpatient   Admission Date: 9/14/2022  Length of Stay: 2 days  Attending Physician: Evens Mccauley, *  Primary Care Provider: No Maher MD        Subjective:     Principal Problem:Diabetic ulcer of right foot        HPI:  Mr Fernandez is a 52 year old male with PMHx of HTN, Stroke, DM, HLD and gastroparesis and cholecystectomy who presents to [facility] from [facility] for further treatment of Right diabetic foot wound. He ws transferred for Podiatry and Infectious Disease services. Denies associated symptoms of chest pain, shortness of breath, fever or chills. According to [facility]  MD, Blood cultures at previous hospital positive for Staph schleiferi. He was being treated with Rocephin and Vancomycin.  MRI of the RLE showed an 8.4cm dorsal fluid collection, with a possibilty of osteo. Patient is a full code, sighficant other and mother are surrogate Decision maker. Patient is being admitted under the care of [facility],.       Overview/Hospital Course:  Patient is a 52 year old male who presented to [facility] from [facility] for Podiatry and Infectious Disease Services. Podiatry following, patient under I & D at bedside last night. He was taken for surgery today and underwent I & D, Debridement and bone biopsy. Post procedure, patient doing well, vital signs stable. Will continue IV antibiotics, possible wound vac in 1-2 days.     9/16 He denies any complaint for today . He is s/p I&D of Right foot Diabetic wound and abscess  per Podiatrist . The WBC is trending up and IVAB change fron rocephin to Cefepiem and metronidazol ID consulted .       Interval History:     Review of Systems   Constitutional:  Positive for activity change and chills. Negative for diaphoresis, fatigue and fever.   HENT:  Negative for congestion, ear  discharge, rhinorrhea, sinus pressure, sore throat and trouble swallowing.    Eyes:  Negative for discharge and visual disturbance.   Respiratory:  Negative for apnea, cough, choking, chest tightness, shortness of breath, wheezing and stridor.    Cardiovascular:  Positive for leg swelling (right leg swelling). Negative for chest pain and palpitations.   Gastrointestinal:  Negative for abdominal distention, abdominal pain, diarrhea, nausea and vomiting.   Endocrine: Negative for cold intolerance and heat intolerance.   Genitourinary:  Negative for dysuria, frequency and hematuria.   Musculoskeletal:  Negative for arthralgias, back pain, myalgias and neck pain.        Left side weakness    Skin:  Positive for wound (right foot diabetic wound). Negative for pallor and rash.   Neurological:  Positive for weakness. Negative for dizziness, seizures, syncope and headaches.   Psychiatric/Behavioral:  Negative for agitation, confusion and sleep disturbance.    Objective:     Vital Signs (Most Recent):  Temp: 97.7 °F (36.5 °C) (09/16/22 1158)  Pulse: 91 (09/16/22 1158)  Resp: 18 (09/16/22 1158)  BP: 124/65 (09/16/22 1158)  SpO2: (!) 94 % (09/16/22 1158)   Vital Signs (24h Range):  Temp:  [97 °F (36.1 °C)-102.7 °F (39.3 °C)] 97.7 °F (36.5 °C)  Pulse:  [] 91  Resp:  [15-20] 18  SpO2:  [90 %-99 %] 94 %  BP: (113-175)/(60-93) 124/65     Weight: 103.3 kg (227 lb 11.8 oz)  Body mass index is 29.24 kg/m².    Intake/Output Summary (Last 24 hours) at 9/16/2022 1249  Last data filed at 9/16/2022 1000  Gross per 24 hour   Intake 898.74 ml   Output 900 ml   Net -1.26 ml      Physical Exam  Vitals and nursing note reviewed.   Eyes:      General:         Right eye: No discharge.         Left eye: No discharge.   Cardiovascular:      Heart sounds: No murmur heard.    No friction rub. No gallop.   Pulmonary:      Effort: No respiratory distress.      Breath sounds: No stridor. No wheezing or rhonchi.   Chest:      Chest wall: No  tenderness.   Abdominal:      General: There is no distension.      Palpations: There is no mass.      Tenderness: There is no abdominal tenderness. There is no right CVA tenderness, left CVA tenderness, guarding or rebound.      Hernia: No hernia is present.   Musculoskeletal:         General: No swelling, tenderness, deformity or signs of injury.      Right lower le+ Edema present.      Left lower leg: No edema.      Comments: Rt foot with kerlix dressing    Skin:     Coloration: Skin is not jaundiced or pale.      Findings: No bruising, erythema, lesion or rash.       Significant Labs: All pertinent labs within the past 24 hours have been reviewed.      Significant Imaging: I have reviewed all pertinent imaging results/findings within the past 24 hours.        Assessment/Plan:      * Diabetic ulcer of right foot with abscess   Patient's FSGs are uncontrolled due to hyperglycemia on current medication regimen.  Last A1c reviewed-   Lab Results   Component Value Date    HGBA1C 9.5 (H) 2022     Most recent fingerstick glucose reviewed-   Recent Labs   Lab 09/15/22  1655 09/15/22  2205 22  0616 22  1157   POCTGLUCOSE 185* 241* 222* 246*     Current correctional scale  High  Maintain anti-hyperglycemic dose as follows-   Antihyperglycemics (From admission, onward)    Start     Stop Route Frequency Ordered    22 2100  insulin detemir U-100 pen 25 Units         -- SubQ Nightly 22 1758  insulin aspart U-100 pen 1-10 Units         -- SubQ Before meals & nightly PRN 22 1658        Hold Oral hypoglycemics while patient is in the hospital.    Consult Podiatry   Arterial ultrasound of lower extremities     CKD (chronic kidney disease) stage 3, GFR 30-59 ml/min    Review of some previous labs, looks like renal function around baseline   Will start gentle hydration and assess response     Charcot's joint of foot, right  Podiatry following       Abscess of foot without  toes, right  9/15   S/P- I & D, Debridement and bone biopsy today   Monitor wound cultures   Possible wound vac in 1-2 days   Continue IV antibiotic  ID consulted          Bacteremia  Blood culture at previous hospital positive for Staph Schleiferi   Repeat Blood culture   Consult ID   Continue Vanco and Rocephin       9/15/2022  Repeat BC, no growth to date   Continue IV Vanco and Rocephin   ID consulted       History of stroke  History of Stroke with  Left sided weakness   Continue Statin   Lipid panel   Hold Plavix for possible surgery     Hypertension    Continue home meds   Hydralazine IV PRN     Diabetes mellitus  Patient's FSGs are uncontrolled due to hyperglycemia on current medication regimen.  Last A1c reviewed-   Lab Results   Component Value Date    HGBA1C 9.5 (H) 09/14/2022     Most recent fingerstick glucose reviewed-   Recent Labs   Lab 09/15/22  1655 09/15/22  2205 09/16/22  0616 09/16/22  1157   POCTGLUCOSE 185* 241* 222* 246*     Current correctional scale  Medium  Maintain anti-hyperglycemic dose as follows-   Antihyperglycemics (From admission, onward)    Start     Stop Route Frequency Ordered    09/14/22 2100  insulin detemir U-100 pen 25 Units         -- SubQ Nightly 09/14/22 2015 09/14/22 1758  insulin aspart U-100 pen 1-10 Units         -- SubQ Before meals & nightly PRN 09/14/22 1658        Hold Oral hypoglycemics while patient is in the hospital.    Diabetic gastroparesis  Patient's FSGs are uncontrolled due to hyperglycemia on current medication regimen.  Last A1c reviewed-   Lab Results   Component Value Date    HGBA1C 9.5 (H) 09/14/2022     Most recent fingerstick glucose reviewed-   Recent Labs   Lab 09/15/22  1655 09/15/22  2205 09/16/22  0616 09/16/22  1157   POCTGLUCOSE 185* 241* 222* 246*     Current correctional scale  Medium  Maintain anti-hyperglycemic dose as follows-   Antihyperglycemics (From admission, onward)    Start     Stop Route Frequency Ordered    09/14/22 2100   insulin detemir U-100 pen 25 Units         -- SubQ Nightly 09/14/22 2015 09/14/22 1758  insulin aspart U-100 pen 1-10 Units         -- SubQ Before meals & nightly PRN 09/14/22 1658        Hold Oral hypoglycemics while patient is in the hospital.    Antemetic as needed       VTE Risk Mitigation (From admission, onward)         Ordered     IP VTE HIGH RISK PATIENT  Once         09/14/22 1652     Place sequential compression device  Until discontinued         09/14/22 1652                Discharge Planning   ZECHARIAH:      Code Status: Full Code   Is the patient medically ready for discharge?:     Reason for patient still in hospital (select all that apply): Treatment  Discharge Plan A: Home Health                  Evens Mccauley MD  Department of Hospital Medicine   [facility] - Telemetry ([facility])

## 2022-09-16 NOTE — ASSESSMENT & PLAN NOTE
Patient's FSGs are uncontrolled due to hyperglycemia on current medication regimen.  Last A1c reviewed-   Lab Results   Component Value Date    HGBA1C 9.5 (H) 09/14/2022     Most recent fingerstick glucose reviewed-   Recent Labs   Lab 09/15/22  1655 09/15/22  2205 09/16/22  0616 09/16/22  1157   POCTGLUCOSE 185* 241* 222* 246*     Current correctional scale  Medium  Maintain anti-hyperglycemic dose as follows-   Antihyperglycemics (From admission, onward)    Start     Stop Route Frequency Ordered    09/14/22 2100  insulin detemir U-100 pen 25 Units         -- SubQ Nightly 09/14/22 2015 09/14/22 1758  insulin aspart U-100 pen 1-10 Units         -- SubQ Before meals & nightly PRN 09/14/22 1658        Hold Oral hypoglycemics while patient is in the hospital.

## 2022-09-16 NOTE — ASSESSMENT & PLAN NOTE
Recommend nonweightbearing on the right foot if cultures do show active Charcot versus osteomyelitis.  If patient is active Charcot, he will need to be completely nonweightbearing.  If evidence of osteomyelitis, I am concerned about pathologic fracture as there is a loss of integrity the bony fragments.  Did discuss this in detail with the patient.  Will likely require wound VAC application and also be nonweightbearing to the right foot

## 2022-09-16 NOTE — PLAN OF CARE
Pt oriented x4.  VSS.  Pt remained afebrile throughout this shift.   All meds administered per order.   Pt remained free of falls this shift.   Plan of care reviewed. Patient verbalizes understanding.   Pt moving/turning independently.   Receiving IV Abx  Bed low, side rails up x 2, wheels locked, call light in reach.   Patient instructed to call for assistance.  Will continue to monitor.

## 2022-09-16 NOTE — ASSESSMENT & PLAN NOTE
Patient currently status post incision drainage of large abscess to the right foot.  Approximately 60 cc of purulent material was expressed and flushed intraoperatively.  Bone cultures taken of the medial cuneiform as the bone was noted to be somewhat soft intraoperatively.  Will follow microbiology and pathology reports for this.    Deep cultures were taken from the drainage to determine pathogen causing abscess.  Likely associated with diabetic/Charcot ulceration which has closed in trapped drainage in place.  This began to proliferate and cause significant abscess formation and bacteremia    Discussed with patient that there is hope for salvage at this point.  I will pack the wound open today and have the wound nurses evaluate the patient for possible wound VAC today or wait until the tissues have rebounded slightly to provide patient the best chance for salvage.    Antibiotic duration based on bacteremia and bone cultures taken from the medial cuneiform.  Intraoperatively, I am concerned on possible osteomyelitis and patient would likely require 6-8 weeks of IV antibiotics.  He does follow up at [facility] for his management of the Charcot wound.  This may be an option upon discharge

## 2022-09-16 NOTE — PT/OT/SLP EVAL
Occupational Therapy   Evaluation    Name: Henry Fernandez  MRN: [id number]  Admitting Diagnosis:  Diabetic ulcer of right foot  Recent Surgery: Procedure(s) (LRB):  INCISION AND DRAINAGE, FOOT (Right)  DEBRIDEMENT, FOOT (Right)  BIOPSY, BONE (Right) 1 Day Post-Op    Recommendations:     Discharge Recommendations: home health OT, nursing facility, skilled  Discharge Equipment Recommendations:  other (see comments) (TBD)  Barriers to discharge:  None    Assessment:     Henry Fernandez is a 52 y.o. male with a medical diagnosis of Diabetic ulcer of right foot.  He presents with the following performance deficits affecting function: weakness, impaired endurance, impaired self care skills, impaired functional mobility, gait instability, impaired balance, decreased upper extremity function, decreased lower extremity function, decreased safety awareness, decreased ROM, orthopedic precautions.      Rehab Prognosis: Good; patient would benefit from acute skilled OT services to address these deficits and reach maximum level of function.       Plan:     Patient to be seen 2 x/week to address the above listed problems via therapeutic activities, self-care/home management, therapeutic exercises  Plan of Care Expires: 09/30/22  Plan of Care Reviewed with: patient    Subjective     Chief Complaint: weakness in L side  Patient/Family Comments/goals: return home    Occupational Profile:  Living Environment: lives with mother in a 1 story house with 2 steps to enter and B railings.   Previous level of function: (I) with ADLs and functional mobility. Pt reports he was previously getting therapy from his stroke.  Roles and Routines: drives and works  Equipment Used at Home:  none (Pt has but does not use RW, rollator, and shower chair.)  Assistance upon Discharge: unknown    Pain/Comfort:  Pain Rating 1: 0/10      Objective:     Communicated with: nurse Goyal and epic chart review prior to session.  Patient found sitting edge of bed with  peripheral IV upon OT entry to room.    General Precautions: Standard, fall   Orthopedic Precautions:RLE non weight bearing   Braces: AFO (LLE)  Respiratory Status: Room air    Occupational Performance:    Bed Mobility:    Independent    Functional Mobility/Transfers:  Patient completed Sit <> Stand Transfer with contact guard assistance  with  rolling walker  adhering to RLE NWB.  Functional Mobility: Patient completed 2 steps forward and backward functional mobility with CGA and RW while attempting to maintain RLE NWB status with verbal cueing to increase dynamic standing balance and activity tolerance needed for ADL completion. Difficulty with maintaining RLE NWB precautions with mobility.     Activities of Daily Living:  Lower Body Dressing: minimum assistance primo shoe and AFO. Pt states he is usually able to do this (I) without thick socks.     Cognitive/Visual Perceptual:  Cognitive/Psychosocial Skills:     -       Oriented to: Person, Place, Time, and Situation   -       Follows Commands/attention:Follows multistep  commands  -       Communication: clear/fluent  -       Safety awareness/insight to disability: intact     Physical Exam:  Balance:    -       fair standing balance  Sensation:    -       Intact  Upper Extremity Range of Motion:     -       Right Upper Extremity: WNL  -       Left Upper Extremity: Deficits: limited shoulder and elbow flexion due to previous stroke  Upper Extremity Strength:    -       Right Upper Extremity: 4+/5 grossly  -       Left Upper Extremity: 3-/5 grossly   Strength:    -       Right Upper Extremity: WNL  -       Left Upper Extremity: Deficits: fair , weaker than RUE    AMPAC 6 Click ADL:  AMPAC Total Score: 18    Treatment & Education:  Pt donned gown with CGA. Pt able to recall RLE NWB precautions upon therapist arrival. Pt reports he got up and went to the bathroom. Patient educated on role of OT in acute setting and benefits of participation. Educated on  techniques to use to increase independence and decrease fall risk with functional transfers. Educated on importance of OOB activity and calling for A to meet needs. Encouraged completion of B UE AROM therex throughout the day to tolerance to increase functional strength and activity tolerance. Patient stated understanding and in agreement with POC.     Patient left sitting edge of bed with all lines intact, call button in reach, and nurse notified    GOALS:   Multidisciplinary Problems       Occupational Therapy Goals          Problem: Occupational Therapy    Goal Priority Disciplines Outcome Interventions   Occupational Therapy Goal     OT, PT/OT     Description: Goals to be met by: 9/30/22     Patient will increase functional independence with ADLs by performing:    LE Dressing with Set-up Assistance to primo shoe and AFO.  Stand pivot transfers with Modified [place].  Upper extremity exercise program x20 reps per handout, with assistance as needed.                         History:     Past Medical History:   Diagnosis Date    Diabetes mellitus     Gastroparesis     Hypertension     Stroke          Past Surgical History:   Procedure Laterality Date    BONE BIOPSY Right 9/15/2022    Procedure: BIOPSY, BONE;  Surgeon: Tatyana Siddiqui DPM;  Location: [facility] OR;  Service: Podiatry;  Laterality: Right;    CHOLECYSTECTOMY      DEBRIDEMENT OF FOOT Right 9/15/2022    Procedure: DEBRIDEMENT, FOOT;  Surgeon: Tatyana Siddiqui DPM;  Location: [facility] OR;  Service: Podiatry;  Laterality: Right;    INCISION AND DRAINAGE FOOT Right 9/15/2022    Procedure: INCISION AND DRAINAGE, FOOT;  Surgeon: Tatyana Siddiqui DPM;  Location: [facility] OR;  Service: Podiatry;  Laterality: Right;       Time Tracking:     OT Date of Treatment: 09/16/22  OT Start Time: 0930  OT Stop Time: 0955  OT Total Time (min): 25 min    Billable Minutes:Evaluation 15  Therapeutic Activity 10    9/16/2022  Lacie Chandler OT

## 2022-09-16 NOTE — ASSESSMENT & PLAN NOTE
Patient's FSGs are uncontrolled due to hyperglycemia on current medication regimen.  Last A1c reviewed-   Lab Results   Component Value Date    HGBA1C 9.5 (H) 09/14/2022     Most recent fingerstick glucose reviewed-   Recent Labs   Lab 09/15/22  1655 09/15/22  2205 09/16/22  0616 09/16/22  1157   POCTGLUCOSE 185* 241* 222* 246*     Current correctional scale  Medium  Maintain anti-hyperglycemic dose as follows-   Antihyperglycemics (From admission, onward)    Start     Stop Route Frequency Ordered    09/14/22 2100  insulin detemir U-100 pen 25 Units         -- SubQ Nightly 09/14/22 2015 09/14/22 1758  insulin aspart U-100 pen 1-10 Units         -- SubQ Before meals & nightly PRN 09/14/22 1658        Hold Oral hypoglycemics while patient is in the hospital.    Antemetic as needed

## 2022-09-16 NOTE — PLAN OF CARE
OT eval completed. (I) for bed mobility, CGA for sit>stand and 2 small steps forward/backward using RW. Pt reports he is non-weightbearing on RLE. Recommends home health OT vs skilled nursing, depending on progress.

## 2022-09-16 NOTE — SUBJECTIVE & OBJECTIVE
Interval History:     Review of Systems   Constitutional:  Positive for activity change and chills. Negative for diaphoresis, fatigue and fever.   HENT:  Negative for congestion, ear discharge, rhinorrhea, sinus pressure, sore throat and trouble swallowing.    Eyes:  Negative for discharge and visual disturbance.   Respiratory:  Negative for apnea, cough, choking, chest tightness, shortness of breath, wheezing and stridor.    Cardiovascular:  Positive for leg swelling (right leg swelling). Negative for chest pain and palpitations.   Gastrointestinal:  Negative for abdominal distention, abdominal pain, diarrhea, nausea and vomiting.   Endocrine: Negative for cold intolerance and heat intolerance.   Genitourinary:  Negative for dysuria, frequency and hematuria.   Musculoskeletal:  Negative for arthralgias, back pain, myalgias and neck pain.        Left side weakness    Skin:  Positive for wound (right foot diabetic wound). Negative for pallor and rash.   Neurological:  Positive for weakness. Negative for dizziness, seizures, syncope and headaches.   Psychiatric/Behavioral:  Negative for agitation, confusion and sleep disturbance.    Objective:     Vital Signs (Most Recent):  Temp: 97.7 °F (36.5 °C) (09/16/22 1158)  Pulse: 91 (09/16/22 1158)  Resp: 18 (09/16/22 1158)  BP: 124/65 (09/16/22 1158)  SpO2: (!) 94 % (09/16/22 1158)   Vital Signs (24h Range):  Temp:  [97 °F (36.1 °C)-102.7 °F (39.3 °C)] 97.7 °F (36.5 °C)  Pulse:  [] 91  Resp:  [15-20] 18  SpO2:  [90 %-99 %] 94 %  BP: (113-175)/(60-93) 124/65     Weight: 103.3 kg (227 lb 11.8 oz)  Body mass index is 29.24 kg/m².    Intake/Output Summary (Last 24 hours) at 9/16/2022 1249  Last data filed at 9/16/2022 1000  Gross per 24 hour   Intake 898.74 ml   Output 900 ml   Net -1.26 ml      Physical Exam  Vitals and nursing note reviewed.   Eyes:      General:         Right eye: No discharge.         Left eye: No discharge.   Cardiovascular:      Heart sounds: No  murmur heard.    No friction rub. No gallop.   Pulmonary:      Effort: No respiratory distress.      Breath sounds: No stridor. No wheezing or rhonchi.   Chest:      Chest wall: No tenderness.   Abdominal:      General: There is no distension.      Palpations: There is no mass.      Tenderness: There is no abdominal tenderness. There is no right CVA tenderness, left CVA tenderness, guarding or rebound.      Hernia: No hernia is present.   Musculoskeletal:         General: No swelling, tenderness, deformity or signs of injury.      Right lower le+ Edema present.      Left lower leg: No edema.      Comments: Rt foot with kerlix dressing    Skin:     Coloration: Skin is not jaundiced or pale.      Findings: No bruising, erythema, lesion or rash.       Significant Labs: All pertinent labs within the past 24 hours have been reviewed.      Significant Imaging: I have reviewed all pertinent imaging results/findings within the past 24 hours.

## 2022-09-16 NOTE — CONSULTS
Pharmacokinetic Assessment Follow Up: IV Vancomycin    Vancomycin serum concentration assessment(s):    The random level was drawn correctly and can be used to guide therapy at this time. The measurement is below the desired definitive target range of 15 to 20 mcg/mL.    Vancomycin Regimen Plan:    A 1500 mg dose was given following the serum concentration of 10.2 mcg/mL. Re-dose when the random level is less than 20 mcg/mL, next level to be drawn at 0400 on 9/17.    Drug levels (last 3 results):  Recent Labs   Lab Result Units 09/15/22  0529 09/15/22  1735 09/16/22  1448   Vancomycin, Random ug/mL 23.7 14.0 10.2       Pharmacy will continue to follow and monitor vancomycin.    Please contact pharmacy at [phone] for questions regarding this assessment.    Thank you for the consult,   Andrei Caldwell, PharmD 9/16/2022 5:14 PM       Patient brief summary:  Henry Fernandez is a 52 y.o. male initiated on antimicrobial therapy with IV Vancomycin for treatment of bacteremia, bone/joint infection.    The patient is being pulse dosed based on random vancomycin levels.     Drug Allergies:   Review of patient's allergies indicates:  No Known Allergies    Actual Body Weight:   103.3 kg    Renal Function:   Estimated Creatinine Clearance: 74.7 mL/min (A) (based on SCr of 1.5 mg/dL (H)).,     Dialysis Method (if applicable):  N/A    CBC (last 72 hours):  Recent Labs   Lab Result Units 09/14/22  1629 09/14/22  1701 09/16/22  0452   WBC K/uL 21.51*  --  30.83*   Hemoglobin g/dL 10.6*  --  9.2*   Hemoglobin A1C %  --  9.5*  --    Hematocrit % 32.5*  --  28.8*   Platelets K/uL 429  --  463*   Gran % % 76.0*  --  81.5*   Lymph % % 9.0*  --  8.0*   Mono % % 12.0  --  6.4   Eosinophil % % 3.0  --  0.5   Basophil % % 0.0  --  0.4   Differential Method  Manual  --  Automated       Metabolic Panel (last 72 hours):  Recent Labs   Lab Result Units 09/14/22  1630 09/15/22  1735 09/16/22  0452   Sodium mmol/L 133*  --  134*   Potassium mmol/L  3.7  --  3.8   Chloride mmol/L 100  --  103   CO2 mmol/L 21*  --  20*   Glucose mg/dL 256*  --  211*   BUN mg/dL 16  --  14   Creatinine mg/dL 1.7* 1.5* 1.5*   Albumin g/dL 1.9*  --  1.7*   Total Bilirubin mg/dL 0.8  --  0.8   Alkaline Phosphatase U/L 98  --  109   AST U/L 22  --  25   ALT U/L 18  --  23       Vancomycin Administrations:  vancomycin given in the last 96 hours                     vancomycin 1.5 g in dextrose 5 % 250 mL IVPB (ready to mix) (mg) 1,500 mg New Bag 09/16/22 1707    vancomycin 1.5 g in dextrose 5 % 250 mL IVPB (ready to mix) (mg) 1,500 mg New Bag 09/15/22 2037    vancomycin 2 g in dextrose 5 % 500 mL IVPB (mg) 2,000 mg New Bag 09/14/22 1853                    Microbiologic Results:  Microbiology Results (last 7 days)       Procedure Component Value Units Date/Time    Culture, MRSA [[id number]] Collected: 09/14/22 1719    Order Status: Completed Specimen: MRSA source from Nares, Left Updated: 09/16/22 0738     MRSA Surveillance Screen No MRSA isolated    Blood culture [[id number]] Collected: 09/14/22 1726    Order Status: Completed Specimen: Blood from Peripheral, Right Arm Updated: 09/16/22 0613     Blood Culture, Routine No Growth to date      No Growth to date    Narrative:      Specimen # 1    Blood culture [[id number]] Collected: 09/14/22 1918    Order Status: Completed Specimen: Blood from Peripheral, Right Arm Updated: 09/16/22 0613     Blood Culture, Routine No Growth to date      No Growth to date    Narrative:      Specimen # 2  Collection has been rescheduled by ADM1 at 09/14/2022 17:27 Reason:   Talk With Nurse Valencia Will Come Back In 30 for Second Set  Collection has been rescheduled by ADM1 at 09/14/2022 17:27 Reason:   Talk With Nurse Valencia Will Come Back In 30 for Second Set    Tissue culture [[id number]] Collected: 09/15/22 1246    Order Status: Completed Specimen: Tissue from Foot, Right Updated: 09/16/22 0000     Gram Stain Result Few WBC's      Rare Gram positive cocci     Narrative:      Medial Cuneiform Right Foot Tissue Culture    Culture, Anaerobe [[id number]] Collected: 09/15/22 1247    Order Status: Sent Specimen: Abscess from Foot, Right Updated: 09/15/22 2220    Aerobic culture [[id number]] Collected: 09/15/22 1247    Order Status: Sent Specimen: Abscess from Foot, Right Updated: 09/15/22 2220    Aerobic culture [[id number]]     Order Status: No result Specimen: Abscess from Foot, Right     Aerobic culture [[id number]]     Order Status: Canceled Specimen: Abscess from Foot, Right

## 2022-09-16 NOTE — PLAN OF CARE
EVAL AND TX COMPLETED: Pt sitting EOB upon PT arrival, facilitated transfers with CGA, ambulated 3 steps with RW and L AFO; pt unable to maintain NWB to LLE. Recommend SNF vs HHPT. May require DME pending on wound status/wb precautions.

## 2022-09-16 NOTE — CONSULTS
Pharmacokinetic Assessment Follow Up: IV Vancomycin    Vancomycin serum concentration assessment(s):    The random level was drawn correctly and can be used to guide therapy at this time. The measurement is below the desired definitive target range of 15 to 20 mcg/mL.    Vancomycin Regimen Plan:    A 1500 mg dose was given following the serum level of 14.0 mcg/mL. Re-dose when the random level is less than 20 mcg/mL, next level to be drawn at 1430 on 9/16.    Drug levels (last 3 results):  Recent Labs   Lab Result Units 09/15/22  0529 09/15/22  1735   Vancomycin, Random ug/mL 23.7 14.0       Pharmacy will continue to follow and monitor vancomycin.    Please contact pharmacy at [phone] for questions regarding this assessment.    Thank you for the consult,   Andrei Caldwell, PharmD 9/15/2022 8:45 PM         Patient brief summary:  Henry Fernandez is a 52 y.o. male initiated on antimicrobial therapy with IV Vancomycin for treatment of bacteremia.    The patient is being pulse dosed based on random vancomycin levels.    Drug Allergies:   Review of patient's allergies indicates:  No Known Allergies    Actual Body Weight:   103.3 kg    Renal Function:   Estimated Creatinine Clearance: 74.7 mL/min (A) (based on SCr of 1.5 mg/dL (H)).,     Dialysis Method (if applicable):  N/A    CBC (last 72 hours):  Recent Labs   Lab Result Units 09/14/22  1629 09/14/22  1701   WBC K/uL 21.51*  --    Hemoglobin g/dL 10.6*  --    Hemoglobin A1C %  --  9.5*   Hematocrit % 32.5*  --    Platelets K/uL 429  --    Gran % % 76.0*  --    Lymph % % 9.0*  --    Mono % % 12.0  --    Eosinophil % % 3.0  --    Basophil % % 0.0  --    Differential Method  Manual  --        Metabolic Panel (last 72 hours):  Recent Labs   Lab Result Units 09/14/22  1630 09/15/22  1735   Sodium mmol/L 133*  --    Potassium mmol/L 3.7  --    Chloride mmol/L 100  --    CO2 mmol/L 21*  --    Glucose mg/dL 256*  --    BUN mg/dL 16  --    Creatinine mg/dL 1.7* 1.5*   Albumin  g/dL 1.9*  --    Total Bilirubin mg/dL 0.8  --    Alkaline Phosphatase U/L 98  --    AST U/L 22  --    ALT U/L 18  --        Vancomycin Administrations:  vancomycin given in the last 96 hours                     vancomycin 1.5 g in dextrose 5 % 250 mL IVPB (ready to mix) (mg) 1,500 mg New Bag 09/15/22 2037    vancomycin 2 g in dextrose 5 % 500 mL IVPB (mg) 2,000 mg New Bag 09/14/22 1853                    Microbiologic Results:  Microbiology Results (last 7 days)       Procedure Component Value Units Date/Time    Tissue culture [[id number]] Collected: 09/15/22 1246    Order Status: Sent Specimen: Tissue from Foot, Right Updated: 09/15/22 1346    Aerobic culture [[id number]] Collected: 09/15/22 1247    Order Status: Sent Specimen: Abscess from Foot, Right Updated: 09/15/22 1311    Culture, Anaerobe [[id number]] Collected: 09/15/22 1247    Order Status: Sent Specimen: Abscess from Foot, Right Updated: 09/15/22 1311    Blood culture [[id number]] Collected: 09/14/22 1918    Order Status: Completed Specimen: Blood from Peripheral, Right Arm Updated: 09/15/22 1115     Blood Culture, Routine No Growth to date    Narrative:      Specimen # 2  Collection has been rescheduled by ADM1 at 09/14/2022 17:27 Reason:   Talk With Nurse Annie Will Come Back In 30 for Second Set  Collection has been rescheduled by ADM1 at 09/14/2022 17:27 Reason:   Talk With Nurse Annie Will Come Back In 30 for Second Set    Blood culture [[id number]] Collected: 09/14/22 1726    Order Status: Completed Specimen: Blood from Peripheral, Right Arm Updated: 09/15/22 1115     Blood Culture, Routine No Growth to date    Narrative:      Specimen # 1    Aerobic culture [[id number]]     Order Status: No result Specimen: Abscess from Foot, Right     Culture, MRSA [[id number]] Collected: 09/14/22 1719    Order Status: Sent Specimen: MRSA source from Nares, Left Updated: 09/15/22 0238    Aerobic culture [[id number]]     Order Status: Canceled Specimen: Abscess from  Foot, Right

## 2022-09-16 NOTE — SUBJECTIVE & OBJECTIVE
Subjective:     Interval History:  Patient reports he is doing well.  Does notice some drainage on his dressings but reports minimal pain to the right foot.    Follow-up For: Procedure(s) (LRB):  INCISION AND DRAINAGE, FOOT (Right)  DEBRIDEMENT, FOOT (Right)  BIOPSY, BONE (Right)    Post-Operative Day: 1 Day Post-Op    Scheduled Meds:   amLODIPine  10 mg Oral Daily    atorvastatin  20 mg Oral Daily    ceFEPime (MAXIPIME) IVPB  1 g Intravenous Q8H    insulin detemir U-100  25 Units Subcutaneous QHS    metroNIDAZOLE  500 mg Oral Q8H     Continuous Infusions:   sodium chloride 0.9% 75 mL/hr at 09/16/22 1302     PRN Meds:acetaminophen, albuterol-ipratropium, aluminum-magnesium hydroxide-simethicone, glucagon (human recombinant), glucose, glucose, insulin aspart U-100, melatonin, naloxone, ondansetron, polyethylene glycol, simethicone, sodium chloride 0.9%, Pharmacy to dose Vancomycin consult **AND** vancomycin - pharmacy to dose    Review of Systems  Objective:     Vital Signs (Most Recent):  Temp: 97.7 °F (36.5 °C) (09/16/22 1158)  Pulse: 91 (09/16/22 1158)  Resp: 18 (09/16/22 1158)  BP: 124/65 (09/16/22 1158)  SpO2: (!) 94 % (09/16/22 1158)   Vital Signs (24h Range):  Temp:  [97.7 °F (36.5 °C)-102.7 °F (39.3 °C)] 97.7 °F (36.5 °C)  Pulse:  [] 91  Resp:  [15-20] 18  SpO2:  [90 %-99 %] 94 %  BP: (119-175)/(60-93) 124/65     Weight: 103.3 kg (227 lb 11.8 oz)  Body mass index is 29.24 kg/m².    Foot Exam  CONSTITUTIONAL:  Patient is awake, alert, and oriented to person, place, and time.  Patient is well groomed with normal appearance.  No activity change.  No appetite change.    EYES:  Pupils are equal and reactive to light.  No icterus    ENT:  Mucous membranes are moist.  Dentition intact.    PULMONARY:  Breathing is nonlabored.  No wheezing or rales    VASCULAR:  The right dorsalis pedis pulse 2/4 and the right posterior tibial pulse 1/4.  Capillary refill is intact.  Pedal hair growth decreased.      NEUROLOGICAL:  Protective sensation is not intact to the right left foot.  Vibratory sensation is diminished.  Proprioception is intact.  Sharp/dull is reduced.    DERMATOLOGICAL:  Incision present to the medial aspect of the right foot where large abscess was noted.  The wound does probe dorsally across the foot approximately 8 cm.  It progresses proximally to the inferior aspect of the ankle and the deep tissue layer.  The wound also wraps medially to the medial aspect of the foot and progresses to the area of previous Charcot foot ulceration.  Large pocket is noted which is packed open with Nu Gauze packing.  There is no additional purulence expressed this morning.  Packing was removed and the area was flushed with normal saline all running clear.  Skin appears to be normal without evidence of necrosis    MUSCULOSKELETAL:  Status post right I and D    Laboratory:  A1C:   Recent Labs   Lab 09/14/22  1701   HGBA1C 9.5*     ABGs: No results for input(s): PH, PCO2, HCO3, POCSATURATED, BE in the last 168 hours.  Blood Cultures:   Recent Labs   Lab 09/14/22  1726 09/14/22  1918   LABBLOO No Growth to date  No Growth to date No Growth to date  No Growth to date     Cardiac Markers: No results for input(s): CKMB, TROPONINT, MYOGLOBIN in the last 168 hours.  CBC:   Recent Labs   Lab 09/16/22  0452   WBC 30.83*   RBC 3.16*   HGB 9.2*   HCT 28.8*   *   MCV 91   MCH 29.1   MCHC 31.9*     CMP:   Recent Labs   Lab 09/16/22  0452   *   CALCIUM 7.5*   ALBUMIN 1.7*   PROT 5.6*   *   K 3.8   CO2 20*      BUN 14   CREATININE 1.5*   ALKPHOS 109   ALT 23   AST 25   BILITOT 0.8     CPS: {:LNK,LABRCNTIP[  CRP:   Recent Labs   Lab 09/14/22  1630   .6*     ESR:   Recent Labs   Lab 09/14/22  1630   SEDRATE 120*     Prealbumin: No results for input(s): PREALBUMIN in the last 48 hours.  Wound Cultures: No results for input(s): LABAERO in the last 4320 hours.  Microbiology Results (last 7 days)        Procedure Component Value Units Date/Time    Culture, MRSA [[id number]] Collected: 09/14/22 1719    Order Status: Completed Specimen: MRSA source from Nares, Left Updated: 09/16/22 0738     MRSA Surveillance Screen No MRSA isolated    Blood culture [[id number]] Collected: 09/14/22 1726    Order Status: Completed Specimen: Blood from Peripheral, Right Arm Updated: 09/16/22 0613     Blood Culture, Routine No Growth to date      No Growth to date    Narrative:      Specimen # 1    Blood culture [[id number]] Collected: 09/14/22 1918    Order Status: Completed Specimen: Blood from Peripheral, Right Arm Updated: 09/16/22 0613     Blood Culture, Routine No Growth to date      No Growth to date    Narrative:      Specimen # 2  Collection has been rescheduled by ADM1 at 09/14/2022 17:27 Reason:   Talk With Nurse Annie Will Come Back In 30 for Second Set  Collection has been rescheduled by ADM1 at 09/14/2022 17:27 Reason:   Talk With Nurse Annie Will Come Back In 30 for Second Set    Tissue culture [[id number]] Collected: 09/15/22 1246    Order Status: Completed Specimen: Tissue from Foot, Right Updated: 09/16/22 0000     Gram Stain Result Few WBC's      Rare Gram positive cocci    Narrative:      Medial Cuneiform Right Foot Tissue Culture    Culture, Anaerobe [[id number]] Collected: 09/15/22 1247    Order Status: Sent Specimen: Abscess from Foot, Right Updated: 09/15/22 2220    Aerobic culture [[id number]] Collected: 09/15/22 1247    Order Status: Sent Specimen: Abscess from Foot, Right Updated: 09/15/22 2220    Aerobic culture [[id number]]     Order Status: No result Specimen: Abscess from Foot, Right     Aerobic culture [[id number]]     Order Status: Canceled Specimen: Abscess from Foot, Right           Specimen (24h ago, onward)      None          No results for input(s): COLORU, CLARITYU, SPECGRAV, PHUR, PROTEINUA, GLUCOSEU, BILIRUBINCON, BLOODU, WBCU, RBCU, BACTERIA, MUCUS, NITRITE, LEUKOCYTESUR, UROBILINOGEN, HYALINECASTS in  the last 168 hours.  All pertinent labs reviewed within the last 24 hours.    Diagnostic Results:  I have reviewed all pertinent imaging results/findings within the past 24 hours.      All labs and imaging were reviewed in detail with the patient in the room.    Medical history and chart was thoroughly reviewed.    Plan discussed with attending provider

## 2022-09-17 LAB
CREAT SERPL-MCNC: 1.3 MG/DL (ref 0.5–1.4)
EST. GFR  (NO RACE VARIABLE): >60 ML/MIN/1.73 M^2
POCT GLUCOSE: 229 MG/DL (ref 70–110)
POCT GLUCOSE: 260 MG/DL (ref 70–110)
POCT GLUCOSE: 262 MG/DL (ref 70–110)
POCT GLUCOSE: 318 MG/DL (ref 70–110)
VANCOMYCIN SERPL-MCNC: 16 UG/ML
VANCOMYCIN SERPL-MCNC: 19.1 UG/ML

## 2022-09-17 PROCEDURE — 82565 ASSAY OF CREATININE: CPT | Performed by: INTERNAL MEDICINE

## 2022-09-17 PROCEDURE — 80202 ASSAY OF VANCOMYCIN: CPT | Performed by: INTERNAL MEDICINE

## 2022-09-17 PROCEDURE — 25000003 PHARM REV CODE 250: Performed by: INTERNAL MEDICINE

## 2022-09-17 PROCEDURE — 21400001 HC TELEMETRY ROOM

## 2022-09-17 PROCEDURE — 25000003 PHARM REV CODE 250: Performed by: PODIATRIST

## 2022-09-17 PROCEDURE — 63600175 PHARM REV CODE 636 W HCPCS: Performed by: INTERNAL MEDICINE

## 2022-09-17 PROCEDURE — 94761 N-INVAS EAR/PLS OXIMETRY MLT: CPT

## 2022-09-17 PROCEDURE — 36415 COLL VENOUS BLD VENIPUNCTURE: CPT | Performed by: INTERNAL MEDICINE

## 2022-09-17 RX ORDER — VANCOMYCIN HCL IN 5 % DEXTROSE 1G/250ML
1000 PLASTIC BAG, INJECTION (ML) INTRAVENOUS ONCE
Status: COMPLETED | OUTPATIENT
Start: 2022-09-17 | End: 2022-09-18

## 2022-09-17 RX ADMIN — INSULIN ASPART 8 UNITS: 100 INJECTION, SOLUTION INTRAVENOUS; SUBCUTANEOUS at 11:09

## 2022-09-17 RX ADMIN — VANCOMYCIN HYDROCHLORIDE 1000 MG: 1 INJECTION, POWDER, LYOPHILIZED, FOR SOLUTION INTRAVENOUS at 11:09

## 2022-09-17 RX ADMIN — METRONIDAZOLE 500 MG: 500 TABLET ORAL at 02:09

## 2022-09-17 RX ADMIN — INSULIN ASPART 4 UNITS: 100 INJECTION, SOLUTION INTRAVENOUS; SUBCUTANEOUS at 05:09

## 2022-09-17 RX ADMIN — AMLODIPINE BESYLATE 10 MG: 10 TABLET ORAL at 08:09

## 2022-09-17 RX ADMIN — INSULIN ASPART 6 UNITS: 100 INJECTION, SOLUTION INTRAVENOUS; SUBCUTANEOUS at 05:09

## 2022-09-17 RX ADMIN — ATORVASTATIN CALCIUM 20 MG: 10 TABLET, FILM COATED ORAL at 08:09

## 2022-09-17 RX ADMIN — CEFEPIME HYDROCHLORIDE 1 G: 1 INJECTION, SOLUTION INTRAVENOUS at 02:09

## 2022-09-17 RX ADMIN — INSULIN ASPART 3 UNITS: 100 INJECTION, SOLUTION INTRAVENOUS; SUBCUTANEOUS at 10:09

## 2022-09-17 RX ADMIN — VANCOMYCIN HYDROCHLORIDE 1250 MG: 1.25 INJECTION, POWDER, LYOPHILIZED, FOR SOLUTION INTRAVENOUS at 06:09

## 2022-09-17 RX ADMIN — METRONIDAZOLE 500 MG: 500 TABLET ORAL at 10:09

## 2022-09-17 RX ADMIN — CEFEPIME HYDROCHLORIDE 1 G: 1 INJECTION, SOLUTION INTRAVENOUS at 05:09

## 2022-09-17 RX ADMIN — METRONIDAZOLE 500 MG: 500 TABLET ORAL at 05:09

## 2022-09-17 RX ADMIN — CEFEPIME HYDROCHLORIDE 1 G: 1 INJECTION, SOLUTION INTRAVENOUS at 10:09

## 2022-09-17 NOTE — PLAN OF CARE
Problem: Adult Inpatient Plan of Care  Goal: Plan of Care Review  Outcome: Ongoing, Progressing  Goal: Patient-Specific Goal (Individualized)  Outcome: Ongoing, Progressing  Goal: Absence of Hospital-Acquired Illness or Injury  Outcome: Ongoing, Progressing  Goal: Optimal Comfort and Wellbeing  Outcome: Ongoing, Progressing  Goal: Readiness for Transition of Care  Outcome: Ongoing, Progressing     Problem: Diabetes Comorbidity  Goal: Blood Glucose Level Within Targeted Range  Outcome: Ongoing, Progressing     Problem: Infection  Goal: Absence of Infection Signs and Symptoms  Outcome: Ongoing, Progressing     Problem: Impaired Wound Healing  Goal: Optimal Wound Healing  Outcome: Ongoing, Progressing     Problem: Fall Injury Risk  Goal: Absence of Fall and Fall-Related Injury  Outcome: Ongoing, Progressing

## 2022-09-17 NOTE — ASSESSMENT & PLAN NOTE
S/P I&D per  Podiatrist   S/P Bone cx   Wound cx (+) for staph  Cont IVAB  Will need 6 week of IVAB

## 2022-09-17 NOTE — PROGRESS NOTES
Pharmacokinetic Assessment Follow Up: IV Vancomycin    Vancomycin serum concentration assessment(s):    The random level was drawn correctly and can be used to guide therapy at this time. The measurement is within the desired definitive target range of 15 to 20 mcg/mL.    Vancomycin Regimen Plan:    Will continue pulse dosing regimen with a one time dose of vancomycin 1250 mg.     Re-dose when the random level is less than 20 mcg/mL, next level to be drawn at 1800 on 09/17    Drug levels (last 3 results):  Recent Labs   Lab Result Units 09/15/22  1735 09/16/22  1448 09/17/22  0446   Vancomycin, Random ug/mL 14.0 10.2 16.0       Pharmacy will continue to follow and monitor vancomycin.    Please contact pharmacy at extension [phone] for questions regarding this assessment.    Thank you for the consult,   Tiera Carvalho       Patient brief summary:  Henry Fernandez is a 52 y.o. male initiated on antimicrobial therapy with IV Vancomycin for treatment of  bacteremia & bone/joint  infection    The patient's current regimen is pulse dosing.     Drug Allergies:   Review of patient's allergies indicates:  No Known Allergies    Actual Body Weight:   103.3 kg    Renal Function:   Estimated Creatinine Clearance: 86.1 mL/min (based on SCr of 1.3 mg/dL).,     Dialysis Method (if applicable):  N/A    CBC (last 72 hours):  Recent Labs   Lab Result Units 09/14/22  1629 09/14/22  1701 09/16/22  0452   WBC K/uL 21.51*  --  30.83*   Hemoglobin g/dL 10.6*  --  9.2*   Hemoglobin A1C %  --  9.5*  --    Hematocrit % 32.5*  --  28.8*   Platelets K/uL 429  --  463*   Gran % % 76.0*  --  81.5*   Lymph % % 9.0*  --  8.0*   Mono % % 12.0  --  6.4   Eosinophil % % 3.0  --  0.5   Basophil % % 0.0  --  0.4   Differential Method  Manual  --  Automated       Metabolic Panel (last 72 hours):  Recent Labs   Lab Result Units 09/14/22  1630 09/15/22  1735 09/16/22  0452 09/17/22  0446   Sodium mmol/L 133*  --  134*  --    Potassium mmol/L 3.7  --  3.8  --     Chloride mmol/L 100  --  103  --    CO2 mmol/L 21*  --  20*  --    Glucose mg/dL 256*  --  211*  --    BUN mg/dL 16  --  14  --    Creatinine mg/dL 1.7* 1.5* 1.5* 1.3   Albumin g/dL 1.9*  --  1.7*  --    Total Bilirubin mg/dL 0.8  --  0.8  --    Alkaline Phosphatase U/L 98  --  109  --    AST U/L 22  --  25  --    ALT U/L 18  --  23  --        Vancomycin Administrations:  vancomycin given in the last 96 hours                     vancomycin 1.5 g in dextrose 5 % 250 mL IVPB (ready to mix) (mg) 1,500 mg New Bag 09/16/22 1707    vancomycin 1.5 g in dextrose 5 % 250 mL IVPB (ready to mix) (mg) 1,500 mg New Bag 09/15/22 2037    vancomycin 2 g in dextrose 5 % 500 mL IVPB (mg) 2,000 mg New Bag 09/14/22 1853                    Microbiologic Results:  Microbiology Results (last 7 days)       Procedure Component Value Units Date/Time    Culture, MRSA [[id number]] Collected: 09/14/22 1719    Order Status: Completed Specimen: MRSA source from Nares, Left Updated: 09/16/22 0738     MRSA Surveillance Screen No MRSA isolated    Blood culture [[id number]] Collected: 09/14/22 1726    Order Status: Completed Specimen: Blood from Peripheral, Right Arm Updated: 09/16/22 0613     Blood Culture, Routine No Growth to date      No Growth to date    Narrative:      Specimen # 1    Blood culture [[id number]] Collected: 09/14/22 1918    Order Status: Completed Specimen: Blood from Peripheral, Right Arm Updated: 09/16/22 0613     Blood Culture, Routine No Growth to date      No Growth to date    Narrative:      Specimen # 2  Collection has been rescheduled by ADM1 at 09/14/2022 17:27 Reason:   Talk With Nurse Valencia Will Come Back In 30 for Second Set  Collection has been rescheduled by ADM1 at 09/14/2022 17:27 Reason:   Talk With Nurse Valencia Will Come Back In 30 for Second Set    Tissue culture [[id number]] Collected: 09/15/22 1246    Order Status: Completed Specimen: Tissue from Foot, Right Updated: 09/16/22 0000     Gram Stain Result Few  WBC's      Rare Gram positive cocci    Narrative:      Medial Cuneiform Right Foot Tissue Culture    Culture, Anaerobe [[id number]] Collected: 09/15/22 1247    Order Status: Sent Specimen: Abscess from Foot, Right Updated: 09/15/22 2220    Aerobic culture [[id number]] Collected: 09/15/22 1247    Order Status: Sent Specimen: Abscess from Foot, Right Updated: 09/15/22 2220    Aerobic culture [[id number]]     Order Status: No result Specimen: Abscess from Foot, Right     Aerobic culture [[id number]]     Order Status: Canceled Specimen: Abscess from Foot, Right

## 2022-09-17 NOTE — ASSESSMENT & PLAN NOTE
Review of some previous labs, looks like renal function around baseline   Will start gentle hydration and assess response     stable

## 2022-09-17 NOTE — ASSESSMENT & PLAN NOTE
Blood culture at previous hospital positive for Staph Schleiferi   Repeat Blood culture   Consult ID   Continue Vanco and Rocephin       9/15/2022  Repeat BC, no growth to date   Continue IV Vanco and Rocephin   ID consulted       Repeated Blood cx NGTD

## 2022-09-17 NOTE — ASSESSMENT & PLAN NOTE
Patient's FSGs are uncontrolled due to hyperglycemia on current medication regimen.  Last A1c reviewed-   Lab Results   Component Value Date    HGBA1C 9.5 (H) 09/14/2022     Most recent fingerstick glucose reviewed-   Recent Labs   Lab 09/16/22  1623 09/16/22 2014 09/17/22  0541 09/17/22  1107   POCTGLUCOSE 263* 276* 229* 318*     Current correctional scale  Medium  Maintain anti-hyperglycemic dose as follows-   Antihyperglycemics (From admission, onward)    Start     Stop Route Frequency Ordered    09/17/22 2100  insulin detemir U-100 pen 30 Units         -- SubQ Nightly 09/17/22 1339    09/14/22 1758  insulin aspart U-100 pen 1-10 Units         -- SubQ Before meals & nightly PRN 09/14/22 1658        Hold Oral hypoglycemics while patient is in the hospital.

## 2022-09-17 NOTE — PROGRESS NOTES
[facility] - Tele[facility] ([facility])  [facility]  Progress Note    Patient Name: Henry Fernandez  MRN: [id number]  Patient Class: IP- Inpatient   Admission Date: 9/14/2022  Length of Stay: 3 days  Attending Physician: Evens Mccauley, *  Primary Care Provider: No Maher MD        Subjective:     Principal Problem:Diabetic ulcer of right foot        HPI:  Mr Fernandez is a 52 year old male with PMHx of HTN, Stroke, DM, HLD and gastroparesis and cholecystectomy who presents to [facility] from [facility] for further treatment of Right diabetic foot wound. He ws transferred for Podiatry and Infectious Disease services. Denies associated symptoms of chest pain, shortness of breath, fever or chills. According to [facility]  MD, Blood cultures at previous hospital positive for Staph schleiferi. He was being treated with Rocephin and Vancomycin.  MRI of the RLE showed an 8.4cm dorsal fluid collection, with a possibilty of osteo. Patient is a full code, sighficant other and mother are surrogate Decision maker. Patient is being admitted under the care of [facility],.       Overview/Hospital Course:  Patient is a 52 year old male who presented to [facility] from [facility] for Podiatry and Infectious Disease Services. Podiatry following, patient under I & D at bedside last night. He was taken for surgery today and underwent I & D, Debridement and bone biopsy. Post procedure, patient doing well, vital signs stable. Will continue IV antibiotics, possible wound vac in 1-2 days.     9/16 He denies any complaint for today . He is s/p I&D of Right foot Diabetic wound and abscess  per Podiatrist . The WBC is trending up and IVAB change fron rocephin to Cefepiem and metronidazol ID consulted .     9/17 he is complaining of nause /vomiting . The wound cx are (+) for staph . The kidney fucntion is improving . Cont IVAB  and wound care       Interval History:     Review of  Systems  HENT:  Negative for congestion, ear discharge, rhinorrhea, sinus pressure, sore throat and trouble swallowing.    Eyes:  Negative for discharge and visual disturbance.   Respiratory:  Negative for apnea, cough, choking, chest tightness, shortness of breath, wheezing and stridor.    Cardiovascular:  Positive for leg swelling (right leg swelling). Negative for chest pain and palpitations.   Gastrointestinal:  Negative for abdominal distention, abdominal pain, diarrhea, nausea and vomiting.   Endocrine: Negative for cold intolerance and heat intolerance.   Genitourinary:  Negative for dysuria, frequency and hematuria.   Musculoskeletal:  Negative for arthralgias, back pain, myalgias and neck pain.        Left side weakness    Skin:  Positive for wound (right foot diabetic wound). Negative for pallor and rash.   Neurological:  Positive for weakness. Negative for dizziness, seizures, syncope and headaches.   Psychiatric/Behavioral:  Negative for agitation, confusion and sleep disturbance.    Objective:     Vital Signs (Most Recent):  Temp: 98.5 °F (36.9 °C) (09/17/22 1130)  Pulse: 94 (09/17/22 1130)  Resp: 18 (09/17/22 1130)  BP: 132/77 (09/17/22 1130)  SpO2: 99 % (09/17/22 1130) Vital Signs (24h Range):  Temp:  [98.2 °F (36.8 °C)-99.9 °F (37.7 °C)] 98.5 °F (36.9 °C)  Pulse:  [85-99] 94  Resp:  [18-20] 18  SpO2:  [92 %-99 %] 99 %  BP: (124-155)/(63-87) 132/77     Weight: 103.1 kg (227 lb 4.7 oz)  Body mass index is 29.18 kg/m².    Intake/Output Summary (Last 24 hours) at 9/17/2022 1346  Last data filed at 9/17/2022 1200  Gross per 24 hour   Intake --   Output 2825 ml   Net -2825 ml      Physical Exam  Vitals and nursing note reviewed.   Eyes:      General:         Right eye: No discharge.         Left eye: No discharge.   Cardiovascular:      Heart sounds: No murmur heard.    No friction rub. No gallop.   Pulmonary:      Effort: No respiratory distress.      Breath sounds: No stridor. No wheezing or rhonchi.    Chest:      Chest wall: No tenderness.   Abdominal:      General: There is no distension.      Palpations: There is no mass.      Tenderness: There is no abdominal tenderness. There is no right CVA tenderness, left CVA tenderness, guarding or rebound.      Hernia: No hernia is present.   Musculoskeletal:         General: No swelling, tenderness, deformity or signs of injury.      Right lower le+ Edema present.      Left lower leg: No edema.      Comments: Rt foot with kerlix dressing    Skin:     Coloration: Skin is not jaundiced or pale.      Findings: No bruising, erythema, lesion or rash.       Significant Labs: All pertinent labs within the past 24 hours have been reviewed.      Significant Imaging: I have reviewed all pertinent imaging results/findings within the past 24 hours.        Assessment/Plan:      * Diabetic ulcer of right foot with abscess and medial cuneiform osteomyelitis   S/P I&D per  Podiatrist   S/P Bone cx   Wound cx (+) for staph  Cont IVAB  Will need 6 week of IVAB        CKD (chronic kidney disease) stage 3, GFR 30-59 ml/min    Review of some previous labs, looks like renal function around baseline   Will start gentle hydration and assess response     stable     Charcot's joint of foot, right  Podiatry following       Abscess of foot without toes, right  9/15   S/P- I & D, Debridement and bone biopsy today   Monitor wound cultures   Possible wound vac in 1-2 days   Continue IV antibiotic  ID consulted          Bacteremia  Blood culture at previous hospital positive for Staph Schleiferi   Repeat Blood culture   Consult ID   Continue Vanco and Rocephin       9/15/2022  Repeat BC, no growth to date   Continue IV Vanco and Rocephin   ID consulted       Repeated Blood cx NGTD     History of stroke  History of Stroke with  Left sided weakness   Continue Statin   Lipid panel   Hold Plavix for possible surgery     Hypertension    Continue home meds   Hydralazine IV PRN     Diabetes  mellitus  Patient's FSGs are uncontrolled due to hyperglycemia on current medication regimen.  Last A1c reviewed-   Lab Results   Component Value Date    HGBA1C 9.5 (H) 09/14/2022     Most recent fingerstick glucose reviewed-   Recent Labs   Lab 09/16/22  1623 09/16/22 2014 09/17/22  0541 09/17/22  1107   POCTGLUCOSE 263* 276* 229* 318*     Current correctional scale  Medium  Maintain anti-hyperglycemic dose as follows-   Antihyperglycemics (From admission, onward)      Start     Stop Route Frequency Ordered    09/17/22 2100  insulin detemir U-100 pen 30 Units         -- SubQ Nightly 09/17/22 1339    09/14/22 1758  insulin aspart U-100 pen 1-10 Units         -- SubQ Before meals & nightly PRN 09/14/22 1658          Hold Oral hypoglycemics while patient is in the hospital.    Diabetic gastroparesis  Patient's FSGs are uncontrolled due to hyperglycemia on current medication regimen.  Last A1c reviewed-   Lab Results   Component Value Date    HGBA1C 9.5 (H) 09/14/2022     Most recent fingerstick glucose reviewed-   Recent Labs   Lab 09/16/22  1623 09/16/22 2014 09/17/22  0541 09/17/22  1107   POCTGLUCOSE 263* 276* 229* 318*     Current correctional scale  Medium  Maintain anti-hyperglycemic dose as follows-   Antihyperglycemics (From admission, onward)      Start     Stop Route Frequency Ordered    09/17/22 2100  insulin detemir U-100 pen 30 Units         -- SubQ Nightly 09/17/22 1339    09/14/22 1758  insulin aspart U-100 pen 1-10 Units         -- SubQ Before meals & nightly PRN 09/14/22 1658          Hold Oral hypoglycemics while patient is in the hospital.    Antemetic as needed       VTE Risk Mitigation (From admission, onward)           Ordered     IP VTE HIGH RISK PATIENT  Once         09/14/22 1652     Place sequential compression device  Until discontinued         09/14/22 1652                    Discharge Planning   ZECHARIAH:      Code Status: Full Code   Is the patient medically ready for discharge?:     Reason  for patient still in hospital (select all that apply): Tx  Discharge Plan A: Home Health                  Evens Mccauley MD  Department of Hospital Medicine   [facility] - Telemetry ([facility])

## 2022-09-17 NOTE — SUBJECTIVE & OBJECTIVE
Interval History:     Review of Systems  Objective:     Vital Signs (Most Recent):  Temp: 98.5 °F (36.9 °C) (22 1130)  Pulse: 94 (22 1130)  Resp: 18 (22 1130)  BP: 132/77 (22 1130)  SpO2: 99 % (22 1130) Vital Signs (24h Range):  Temp:  [98.2 °F (36.8 °C)-99.9 °F (37.7 °C)] 98.5 °F (36.9 °C)  Pulse:  [85-99] 94  Resp:  [18-20] 18  SpO2:  [92 %-99 %] 99 %  BP: (124-155)/(63-87) 132/77     Weight: 103.1 kg (227 lb 4.7 oz)  Body mass index is 29.18 kg/m².    Intake/Output Summary (Last 24 hours) at 2022 1346  Last data filed at 2022 1200  Gross per 24 hour   Intake --   Output 2825 ml   Net -2825 ml      Physical Exam  Vitals and nursing note reviewed.   Eyes:      General:         Right eye: No discharge.         Left eye: No discharge.   Cardiovascular:      Heart sounds: No murmur heard.    No friction rub. No gallop.   Pulmonary:      Effort: No respiratory distress.      Breath sounds: No stridor. No wheezing or rhonchi.   Chest:      Chest wall: No tenderness.   Abdominal:      General: There is no distension.      Palpations: There is no mass.      Tenderness: There is no abdominal tenderness. There is no right CVA tenderness, left CVA tenderness, guarding or rebound.      Hernia: No hernia is present.   Musculoskeletal:         General: No swelling, tenderness, deformity or signs of injury.      Right lower le+ Edema present.      Left lower leg: No edema.      Comments: Rt foot with kerlix dressing    Skin:     Coloration: Skin is not jaundiced or pale.      Findings: No bruising, erythema, lesion or rash.       Significant Labs: All pertinent labs within the past 24 hours have been reviewed.      Significant Imaging: I have reviewed all pertinent imaging results/findings within the past 24 hours.

## 2022-09-17 NOTE — ASSESSMENT & PLAN NOTE
Patient's FSGs are uncontrolled due to hyperglycemia on current medication regimen.  Last A1c reviewed-   Lab Results   Component Value Date    HGBA1C 9.5 (H) 09/14/2022     Most recent fingerstick glucose reviewed-   Recent Labs   Lab 09/16/22  1623 09/16/22 2014 09/17/22  0541 09/17/22  1107   POCTGLUCOSE 263* 276* 229* 318*     Current correctional scale  Medium  Maintain anti-hyperglycemic dose as follows-   Antihyperglycemics (From admission, onward)    Start     Stop Route Frequency Ordered    09/17/22 2100  insulin detemir U-100 pen 30 Units         -- SubQ Nightly 09/17/22 1339    09/14/22 1758  insulin aspart U-100 pen 1-10 Units         -- SubQ Before meals & nightly PRN 09/14/22 1658        Hold Oral hypoglycemics while patient is in the hospital.    Antemetic as needed

## 2022-09-18 LAB
BASOPHILS # BLD AUTO: 0.06 K/UL (ref 0–0.2)
BASOPHILS NFR BLD: 0.4 % (ref 0–1.9)
DIFFERENTIAL METHOD: ABNORMAL
EOSINOPHIL # BLD AUTO: 0.2 K/UL (ref 0–0.5)
EOSINOPHIL NFR BLD: 1.2 % (ref 0–8)
ERYTHROCYTE [DISTWIDTH] IN BLOOD BY AUTOMATED COUNT: 13 % (ref 11.5–14.5)
HCT VFR BLD AUTO: 26.6 % (ref 40–54)
HGB BLD-MCNC: 8.6 G/DL (ref 14–18)
IMM GRANULOCYTES # BLD AUTO: 0.39 K/UL (ref 0–0.04)
IMM GRANULOCYTES NFR BLD AUTO: 2.7 % (ref 0–0.5)
LYMPHOCYTES # BLD AUTO: 2 K/UL (ref 1–4.8)
LYMPHOCYTES NFR BLD: 13.4 % (ref 18–48)
MCH RBC QN AUTO: 29 PG (ref 27–31)
MCHC RBC AUTO-ENTMCNC: 32.3 G/DL (ref 32–36)
MCV RBC AUTO: 90 FL (ref 82–98)
MONOCYTES # BLD AUTO: 1.3 K/UL (ref 0.3–1)
MONOCYTES NFR BLD: 8.8 % (ref 4–15)
NEUTROPHILS # BLD AUTO: 10.7 K/UL (ref 1.8–7.7)
NEUTROPHILS NFR BLD: 73.5 % (ref 38–73)
NRBC BLD-RTO: 0 /100 WBC
PLATELET # BLD AUTO: 425 K/UL (ref 150–450)
PMV BLD AUTO: 8.7 FL (ref 9.2–12.9)
POCT GLUCOSE: 181 MG/DL (ref 70–110)
POCT GLUCOSE: 230 MG/DL (ref 70–110)
POCT GLUCOSE: 248 MG/DL (ref 70–110)
POCT GLUCOSE: 253 MG/DL (ref 70–110)
PROCALCITONIN SERPL IA-MCNC: 1.3 NG/ML
RBC # BLD AUTO: 2.97 M/UL (ref 4.6–6.2)
VANCOMYCIN SERPL-MCNC: 14.7 UG/ML
WBC # BLD AUTO: 14.58 K/UL (ref 3.9–12.7)

## 2022-09-18 PROCEDURE — 25000003 PHARM REV CODE 250: Performed by: INTERNAL MEDICINE

## 2022-09-18 PROCEDURE — 36415 COLL VENOUS BLD VENIPUNCTURE: CPT | Performed by: INTERNAL MEDICINE

## 2022-09-18 PROCEDURE — 80202 ASSAY OF VANCOMYCIN: CPT | Performed by: INTERNAL MEDICINE

## 2022-09-18 PROCEDURE — 63600175 PHARM REV CODE 636 W HCPCS: Performed by: INTERNAL MEDICINE

## 2022-09-18 PROCEDURE — 25000003 PHARM REV CODE 250: Performed by: PODIATRIST

## 2022-09-18 PROCEDURE — 84145 PROCALCITONIN (PCT): CPT | Performed by: INTERNAL MEDICINE

## 2022-09-18 PROCEDURE — 85025 COMPLETE CBC W/AUTO DIFF WBC: CPT | Performed by: INTERNAL MEDICINE

## 2022-09-18 PROCEDURE — 21400001 HC TELEMETRY ROOM

## 2022-09-18 RX ORDER — SODIUM CHLORIDE 9 MG/ML
INJECTION, SOLUTION INTRAVENOUS
Status: DISCONTINUED | OUTPATIENT
Start: 2022-09-18 | End: 2022-09-23 | Stop reason: HOSPADM

## 2022-09-18 RX ADMIN — AMLODIPINE BESYLATE 10 MG: 10 TABLET ORAL at 08:09

## 2022-09-18 RX ADMIN — METRONIDAZOLE 500 MG: 500 TABLET ORAL at 09:09

## 2022-09-18 RX ADMIN — CEFEPIME HYDROCHLORIDE 1 G: 1 INJECTION, SOLUTION INTRAVENOUS at 05:09

## 2022-09-18 RX ADMIN — METRONIDAZOLE 500 MG: 500 TABLET ORAL at 01:09

## 2022-09-18 RX ADMIN — SODIUM CHLORIDE: 0.9 INJECTION, SOLUTION INTRAVENOUS at 01:09

## 2022-09-18 RX ADMIN — VANCOMYCIN HYDROCHLORIDE 1500 MG: 1.5 INJECTION, POWDER, LYOPHILIZED, FOR SOLUTION INTRAVENOUS at 04:09

## 2022-09-18 RX ADMIN — CEFEPIME HYDROCHLORIDE 1 G: 1 INJECTION, SOLUTION INTRAVENOUS at 09:09

## 2022-09-18 RX ADMIN — INSULIN ASPART 6 UNITS: 100 INJECTION, SOLUTION INTRAVENOUS; SUBCUTANEOUS at 06:09

## 2022-09-18 RX ADMIN — CEFEPIME HYDROCHLORIDE 1 G: 1 INJECTION, SOLUTION INTRAVENOUS at 01:09

## 2022-09-18 RX ADMIN — SODIUM CHLORIDE: 0.9 INJECTION, SOLUTION INTRAVENOUS at 04:09

## 2022-09-18 RX ADMIN — ATORVASTATIN CALCIUM 20 MG: 10 TABLET, FILM COATED ORAL at 08:09

## 2022-09-18 RX ADMIN — INSULIN ASPART 4 UNITS: 100 INJECTION, SOLUTION INTRAVENOUS; SUBCUTANEOUS at 12:09

## 2022-09-18 RX ADMIN — METRONIDAZOLE 500 MG: 500 TABLET ORAL at 05:09

## 2022-09-18 RX ADMIN — INSULIN ASPART 2 UNITS: 100 INJECTION, SOLUTION INTRAVENOUS; SUBCUTANEOUS at 04:09

## 2022-09-18 NOTE — PLAN OF CARE
POC reviewed with pt. Pt verbalizes understanding of POC. No questions at this time.  AAOx4. NADN.  Pt remains free of falls   No complaints at this time.  Safety measures in place. Will continue to monitor.  Informed pt to call for assistance before getting up. Pt verbalizes understanding.

## 2022-09-18 NOTE — PT/OT/SLP PROGRESS
Physical Therapy      Patient Name:  Henry Fernandez   MRN:  [id number]    12:30 P.M.  Patient refused PT session at this time due to c/o multiple episodes of nausea and vomiting. Stated he had already notified his nurse.  Will follow up during next scheduled PT session.

## 2022-09-18 NOTE — PROGRESS NOTES
Pharmacokinetic Assessment Follow Up: IV Vancomycin    Vancomycin serum concentration assessment(s):    The random level was drawn correctly and can be used to guide therapy at this time. The measurement is within the desired definitive target range of 15 to 20 mcg/mL.    Vancomycin Regimen Plan:    Will continue pulse dosing regimen with a one time dose of vancomycin 1000 mg.     Re-dose when the random level is less than 20 mcg/mL, next level to be drawn at 0900 on 09/18    Drug levels (last 3 results):  Recent Labs   Lab Result Units 09/16/22  1448 09/17/22  0446 09/17/22  1817   Vancomycin, Random ug/mL 10.2 16.0 19.1       Pharmacy will continue to follow and monitor vancomycin.    Please contact pharmacy at extension [phone] for questions regarding this assessment.    Thank you for the consult,   Tiera Carvalho       Patient brief summary:  Henry Fernandez is a 52 y.o. male initiated on antimicrobial therapy with IV Vancomycin for treatment of  bacteremia & bone/joint infection    The patient's current regimen is pulse dosing.     Drug Allergies:   Review of patient's allergies indicates:  No Known Allergies    Actual Body Weight:   103.1 kg    Renal Function:   Estimated Creatinine Clearance: 86.1 mL/min (based on SCr of 1.3 mg/dL).,     Dialysis Method (if applicable):  N/A    CBC (last 72 hours):  Recent Labs   Lab Result Units 09/16/22  0452   WBC K/uL 30.83*   Hemoglobin g/dL 9.2*   Hematocrit % 28.8*   Platelets K/uL 463*   Gran % % 81.5*   Lymph % % 8.0*   Mono % % 6.4   Eosinophil % % 0.5   Basophil % % 0.4   Differential Method  Automated       Metabolic Panel (last 72 hours):  Recent Labs   Lab Result Units 09/15/22  1735 09/16/22  0452 09/17/22  0446   Sodium mmol/L  --  134*  --    Potassium mmol/L  --  3.8  --    Chloride mmol/L  --  103  --    CO2 mmol/L  --  20*  --    Glucose mg/dL  --  211*  --    BUN mg/dL  --  14  --    Creatinine mg/dL 1.5* 1.5* 1.3   Albumin g/dL  --  1.7*  --    Total Bilirubin  mg/dL  --  0.8  --    Alkaline Phosphatase U/L  --  109  --    AST U/L  --  25  --    ALT U/L  --  23  --        Vancomycin Administrations:  vancomycin given in the last 96 hours                     vancomycin 1.25 g in dextrose 5% 250 mL IVPB (ready to mix) (mg) 1,250 mg New Bag 09/17/22 0621    vancomycin 1.5 g in dextrose 5 % 250 mL IVPB (ready to mix) (mg) 1,500 mg New Bag 09/16/22 1707    vancomycin 1.5 g in dextrose 5 % 250 mL IVPB (ready to mix) (mg) 1,500 mg New Bag 09/15/22 2037    vancomycin 2 g in dextrose 5 % 500 mL IVPB (mg) 2,000 mg New Bag 09/14/22 1853                    Microbiologic Results:  Microbiology Results (last 7 days)       Procedure Component Value Units Date/Time    Culture, Anaerobe [[id number]] Collected: 09/15/22 1247    Order Status: Completed Specimen: Abscess from Foot, Right Updated: 09/17/22 1325     Anaerobic Culture Culture in progress    Aerobic culture [[id number]]  (Abnormal) Collected: 09/15/22 1247    Order Status: Completed Specimen: Abscess from Foot, Right Updated: 09/17/22 0949     Aerobic Bacterial Culture STAPHYLOCOCCUS AUREUS  Few  Susceptibility pending      Tissue culture [[id number]]  (Abnormal) Collected: 09/15/22 1246    Order Status: Completed Specimen: Tissue from Foot, Right Updated: 09/17/22 0858     Aerobic Culture - Tissue STAPHYLOCOCCUS AUREUS  Moderate  Susceptibility pending       Gram Stain Result Few WBC's      Rare Gram positive cocci    Narrative:      Medial Cuneiform Right Foot Tissue Culture    Blood culture [[id number]] Collected: 09/14/22 1726    Order Status: Completed Specimen: Blood from Peripheral, Right Arm Updated: 09/17/22 0612     Blood Culture, Routine No Growth to date      No Growth to date      No Growth to date    Narrative:      Specimen # 1    Blood culture [[id number]] Collected: 09/14/22 1918    Order Status: Completed Specimen: Blood from Peripheral, Right Arm Updated: 09/17/22 0612     Blood Culture, Routine No Growth to date       No Growth to date      No Growth to date    Narrative:      Specimen # 2  Collection has been rescheduled by ADM1 at 09/14/2022 17:27 Reason:   Talk With Nurse Annie Will Come Back In 30 for Second Set  Collection has been rescheduled by ADM1 at 09/14/2022 17:27 Reason:   Talk With Nurse Annie Will Come Back In 30 for Second Set    Culture, MRSA [[id number]] Collected: 09/14/22 1719    Order Status: Completed Specimen: MRSA source from Nares, Left Updated: 09/16/22 0738     MRSA Surveillance Screen No MRSA isolated    Aerobic culture [[id number]]     Order Status: No result Specimen: Abscess from Foot, Right     Aerobic culture [[id number]]     Order Status: Canceled Specimen: Abscess from Foot, Right

## 2022-09-18 NOTE — PLAN OF CARE
Pt remains fall free this shift.  Pt AAOx4, verbal, clear speech.  Skin warm and dry. No new skin issues.  Telemonitoring in progress  Voids per [place]  Standby assistance with transfers.  CBG AC/ HS with PRN SS insulin.  Bed low, side rails up x 2, wheels locked, call light in reach.  Bed alarm maintained for safety.  Patient instructed to call for assistance.  Hourly rounding completed.  24 hour chart check completed  POC updated and reviewed with pt. Will continue POC.

## 2022-09-18 NOTE — PROGRESS NOTES
Pharmacokinetic Assessment Follow Up: IV Vancomycin    Vancomycin serum concentration assessment(s):    The random level was drawn correctly and can be used to guide therapy at this time. The measurement is within the desired definitive target range of 15 to 20 mcg/mL.    Vancomycin Regimen Plan:    Re-dose when the random level is less than 20 mcg/mL, next level to be drawn at 0430 on 9/19/22    Drug levels (last 3 results):  Recent Labs   Lab Result Units 09/17/22  0446 09/17/22  1817 09/18/22  1100   Vancomycin, Random ug/mL 16.0 19.1 14.7       Pharmacy will continue to follow and monitor vancomycin.    Please contact pharmacy at extension [phone] for questions regarding this assessment.    Thank you for the consult,   Yvette Ramon       Patient brief summary:  Henry Fernandez is a 52 y.o. male initiated on antimicrobial therapy with IV Vancomycin for treatment of bone/joint infection    Drug Allergies:   Review of patient's allergies indicates:  No Known Allergies    Actual Body Weight:   103.1 kg    Renal Function:   Estimated Creatinine Clearance: 86.1 mL/min (based on SCr of 1.3 mg/dL).,     Dialysis Method (if applicable):  N/A    CBC (last 72 hours):  Recent Labs   Lab Result Units 09/16/22  0452 09/18/22  0559   WBC K/uL 30.83* 14.58*   Hemoglobin g/dL 9.2* 8.6*   Hematocrit % 28.8* 26.6*   Platelets K/uL 463* 425   Gran % % 81.5* 73.5*   Lymph % % 8.0* 13.4*   Mono % % 6.4 8.8   Eosinophil % % 0.5 1.2   Basophil % % 0.4 0.4   Differential Method  Automated Automated       Metabolic Panel (last 72 hours):  Recent Labs   Lab Result Units 09/15/22  1735 09/16/22  0452 09/17/22  0446   Sodium mmol/L  --  134*  --    Potassium mmol/L  --  3.8  --    Chloride mmol/L  --  103  --    CO2 mmol/L  --  20*  --    Glucose mg/dL  --  211*  --    BUN mg/dL  --  14  --    Creatinine mg/dL 1.5* 1.5* 1.3   Albumin g/dL  --  1.7*  --    Total Bilirubin mg/dL  --  0.8  --    Alkaline Phosphatase U/L  --  109  --    AST U/L   --  25  --    ALT U/L  --  23  --        Vancomycin Administrations:  vancomycin given in the last 96 hours                     vancomycin in dextrose 5 % 1 gram/250 mL IVPB 1,000 mg (mg) 1,000 mg New Bag 09/17/22 2315    vancomycin 1.25 g in dextrose 5% 250 mL IVPB (ready to mix) (mg) 1,250 mg New Bag 09/17/22 0621    vancomycin 1.5 g in dextrose 5 % 250 mL IVPB (ready to mix) (mg) 1,500 mg New Bag 09/16/22 1707    vancomycin 1.5 g in dextrose 5 % 250 mL IVPB (ready to mix) (mg) 1,500 mg New Bag 09/15/22 2037    vancomycin 2 g in dextrose 5 % 500 mL IVPB (mg) 2,000 mg New Bag 09/14/22 1853                    Microbiologic Results:  Microbiology Results (last 7 days)       Procedure Component Value Units Date/Time    Aerobic culture [[id number]]  (Abnormal) Collected: 09/15/22 1247    Order Status: Completed Specimen: Abscess from Foot, Right Updated: 09/18/22 0832     Aerobic Bacterial Culture STAPHYLOCOCCUS AUREUS  Few  Susceptibility pending      Tissue culture [[id number]]  (Abnormal) Collected: 09/15/22 1246    Order Status: Completed Specimen: Tissue from Foot, Right Updated: 09/18/22 0832     Aerobic Culture - Tissue STAPHYLOCOCCUS AUREUS  Moderate  Susceptibility pending       Gram Stain Result Few WBC's      Rare Gram positive cocci    Narrative:      Medial Cuneiform Right Foot Tissue Culture    Blood culture [[id number]] Collected: 09/14/22 1726    Order Status: Completed Specimen: Blood from Peripheral, Right Arm Updated: 09/18/22 0612     Blood Culture, Routine No Growth to date      No Growth to date      No Growth to date      No Growth to date    Narrative:      Specimen # 1    Blood culture [[id number]] Collected: 09/14/22 1918    Order Status: Completed Specimen: Blood from Peripheral, Right Arm Updated: 09/18/22 0612     Blood Culture, Routine No Growth to date      No Growth to date      No Growth to date      No Growth to date    Narrative:      Specimen # 2  Collection has been rescheduled by ADM1  at 09/14/2022 17:27 Reason:   Talk With Nurse Annie Will Come Back In 30 for Second Set  Collection has been rescheduled by ADM1 at 09/14/2022 17:27 Reason:   Talk With Nurse Annie Will Come Back In 30 for Second Set    Culture, Anaerobe [[id number]] Collected: 09/15/22 1247    Order Status: Completed Specimen: Abscess from Foot, Right Updated: 09/17/22 1325     Anaerobic Culture Culture in progress    Culture, MRSA [[id number]] Collected: 09/14/22 1719    Order Status: Completed Specimen: MRSA source from Nares, Left Updated: 09/16/22 0738     MRSA Surveillance Screen No MRSA isolated    Aerobic culture [[id number]]     Order Status: No result Specimen: Abscess from Foot, Right     Aerobic culture [[id number]]     Order Status: Canceled Specimen: Abscess from Foot, Right

## 2022-09-18 NOTE — SUBJECTIVE & OBJECTIVE
Interval History:     Review of Systems   Constitutional:  Positive for activity change and chills. Negative for diaphoresis, fatigue and fever.   HENT:  Negative for congestion, ear discharge, rhinorrhea, sinus pressure, sore throat and trouble swallowing.    Eyes:  Negative for discharge and visual disturbance.   Respiratory:  Negative for apnea, cough, choking, chest tightness, shortness of breath, wheezing and stridor.    Cardiovascular:  Positive for leg swelling (right leg swelling). Negative for chest pain and palpitations.   Gastrointestinal:  Negative for abdominal distention, abdominal pain, diarrhea, nausea and vomiting.   Endocrine: Negative for cold intolerance and heat intolerance.   Genitourinary:  Negative for dysuria, frequency and hematuria.   Musculoskeletal:  Negative for arthralgias, back pain, myalgias and neck pain.        Left side weakness    Skin:  Positive for wound (right foot diabetic wound). Negative for pallor and rash.   Neurological:  Positive for weakness. Negative for dizziness, seizures, syncope and headaches.   Psychiatric/Behavioral:  Negative for agitation, confusion and sleep disturbance.    Objective:     Vital Signs (Most Recent):  Temp: 98 °F (36.7 °C) (09/18/22 1141)  Pulse: 92 (09/18/22 1255)  Resp: 18 (09/18/22 1141)  BP: (!) 163/92 (09/18/22 1255)  SpO2: 96 % (09/18/22 1141)   Vital Signs (24h Range):  Temp:  [98 °F (36.7 °C)-99.4 °F (37.4 °C)] 98 °F (36.7 °C)  Pulse:  [84-95] 92  Resp:  [16-18] 18  SpO2:  [95 %-99 %] 96 %  BP: (131-171)/(66-95) 163/92     Weight: 103.1 kg (227 lb 4.7 oz)  Body mass index is 29.18 kg/m².    Intake/Output Summary (Last 24 hours) at 9/18/2022 1436  Last data filed at 9/18/2022 1423  Gross per 24 hour   Intake 445.14 ml   Output 2125 ml   Net -1679.86 ml      Physical Exam  Vitals and nursing note reviewed.   Eyes:      General:         Right eye: No discharge.         Left eye: No discharge.   Cardiovascular:      Heart sounds: No murmur  heard.    No friction rub. No gallop.   Pulmonary:      Effort: No respiratory distress.      Breath sounds: No stridor. No wheezing or rhonchi.   Chest:      Chest wall: No tenderness.   Abdominal:      General: There is no distension.      Palpations: There is no mass.      Tenderness: There is no abdominal tenderness. There is no right CVA tenderness, left CVA tenderness, guarding or rebound.      Hernia: No hernia is present.   Musculoskeletal:         General: No swelling, tenderness, deformity or signs of injury.      Right lower le+ Edema present.      Left lower leg: No edema.      Comments: Rt foot with kerlix dressing    Skin:     Coloration: Skin is not jaundiced or pale.      Findings: No bruising, erythema, lesion or rash.       Significant Labs: All pertinent labs within the past 24 hours have been reviewed.      Significant Imaging: I have reviewed all pertinent imaging results/findings within the past 24 hours.

## 2022-09-18 NOTE — PLAN OF CARE
Ace wraps reapplied today to provide compression of the operative extremity.  Wound care to see patient on 09/19/2020 and initiate negative pressure wound therapy.  Patient will likely need outpatient wound care and antibiotics given his positive bone cultures from the medial cuneiform.  Is hopeful for salvage and is not considering amputation this time.

## 2022-09-18 NOTE — PROGRESS NOTES
Critical access hospital - Telemetry ([facility])  [facility]  Progress Note    Patient Name: Henry Fernandez  MRN: [id number]  Patient Class: IP- Inpatient   Admission Date: 9/14/2022  Length of Stay: 4 days  Attending Physician: Evens Mccauley, *  Primary Care Provider: No Maher MD        Subjective:     Principal Problem:Diabetic ulcer of right foot        HPI:  Mr Fernandez is a 52 year old male with PMHx of HTN, Stroke, DM, HLD and gastroparesis and cholecystectomy who presents to [facility] from [facility] for further treatment of Right diabetic foot wound. He ws transferred for Podiatry and Infectious Disease services. Denies associated symptoms of chest pain, shortness of breath, fever or chills. According to [facility]  MD, Blood cultures at previous hospital positive for Staph schleiferi. He was being treated with Rocephin and Vancomycin.  MRI of the RLE showed an 8.4cm dorsal fluid collection, with a possibilty of osteo. Patient is a full code, sighficant other and mother are surrogate Decision maker. Patient is being admitted under the care of [facility],.       Overview/Hospital Course:  Patient is a 52 year old male who presented to [facility] from [facility] for Podiatry and Infectious Disease Services. Podiatry following, patient under I & D at bedside last night. He was taken for surgery today and underwent I & D, Debridement and bone biopsy. Post procedure, patient doing well, vital signs stable. Will continue IV antibiotics, possible wound vac in 1-2 days.     9/16 He denies any complaint for today . He is s/p I&D of Right foot Diabetic wound and abscess  per Podiatrist . The WBC is trending up and IVAB change fron rocephin to Cefepiem and metronidazol ID consulted .     9/17 he is complaining of nause /vomiting . The wound cx are (+) for staph . The kidney fucntion is improving . Cont IVAB  and wound care     9/17 Wond cx (+) for staph . NAEON . CM  consulted for d/c planning . Will need 6 week IVAB .       Interval History:     Review of Systems   Constitutional:  Positive for activity change and chills. Negative for diaphoresis, fatigue and fever.   HENT:  Negative for congestion, ear discharge, rhinorrhea, sinus pressure, sore throat and trouble swallowing.    Eyes:  Negative for discharge and visual disturbance.   Respiratory:  Negative for apnea, cough, choking, chest tightness, shortness of breath, wheezing and stridor.    Cardiovascular:  Positive for leg swelling (right leg swelling). Negative for chest pain and palpitations.   Gastrointestinal:  Negative for abdominal distention, abdominal pain, diarrhea, nausea and vomiting.   Endocrine: Negative for cold intolerance and heat intolerance.   Genitourinary:  Negative for dysuria, frequency and hematuria.   Musculoskeletal:  Negative for arthralgias, back pain, myalgias and neck pain.        Left side weakness    Skin:  Positive for wound (right foot diabetic wound). Negative for pallor and rash.   Neurological:  Positive for weakness. Negative for dizziness, seizures, syncope and headaches.   Psychiatric/Behavioral:  Negative for agitation, confusion and sleep disturbance.    Objective:     Vital Signs (Most Recent):  Temp: 98 °F (36.7 °C) (09/18/22 1141)  Pulse: 92 (09/18/22 1255)  Resp: 18 (09/18/22 1141)  BP: (!) 163/92 (09/18/22 1255)  SpO2: 96 % (09/18/22 1141)   Vital Signs (24h Range):  Temp:  [98 °F (36.7 °C)-99.4 °F (37.4 °C)] 98 °F (36.7 °C)  Pulse:  [84-95] 92  Resp:  [16-18] 18  SpO2:  [95 %-99 %] 96 %  BP: (131-171)/(66-95) 163/92     Weight: 103.1 kg (227 lb 4.7 oz)  Body mass index is 29.18 kg/m².    Intake/Output Summary (Last 24 hours) at 9/18/2022 1436  Last data filed at 9/18/2022 1423  Gross per 24 hour   Intake 445.14 ml   Output 2125 ml   Net -1679.86 ml      Physical Exam  Vitals and nursing note reviewed.   Eyes:      General:         Right eye: No discharge.         Left eye: No  discharge.   Cardiovascular:      Heart sounds: No murmur heard.    No friction rub. No gallop.   Pulmonary:      Effort: No respiratory distress.      Breath sounds: No stridor. No wheezing or rhonchi.   Chest:      Chest wall: No tenderness.   Abdominal:      General: There is no distension.      Palpations: There is no mass.      Tenderness: There is no abdominal tenderness. There is no right CVA tenderness, left CVA tenderness, guarding or rebound.      Hernia: No hernia is present.   Musculoskeletal:         General: No swelling, tenderness, deformity or signs of injury.      Right lower le+ Edema present.      Left lower leg: No edema.      Comments: Rt foot with kerlix dressing    Skin:     Coloration: Skin is not jaundiced or pale.      Findings: No bruising, erythema, lesion or rash.       Significant Labs: All pertinent labs within the past 24 hours have been reviewed.      Significant Imaging: I have reviewed all pertinent imaging results/findings within the past 24 hours.        Assessment/Plan:      * Diabetic ulcer of right foot with abscess and medial cuneiform osteomyelitis   S/P I&D per  Podiatrist   S/P Bone cx   Wound cx (+) for staph  Cont IVAB  Will need 6 week of IVAB        CKD (chronic kidney disease) stage 3, GFR 30-59 ml/min    Review of some previous labs, looks like renal function around baseline   Will start gentle hydration and assess response     stable     Charcot's joint of foot, right  Podiatry following       Abscess of foot without toes, right  9/15   S/P- I & D, Debridement and bone biopsy today   Monitor wound cultures   Possible wound vac in 1-2 days   Continue IV antibiotic  ID consulted          Bacteremia  Blood culture at previous hospital positive for Staph Schleiferi   Repeat Blood culture   Consult ID   Continue Vanco and Rocephin       9/15/2022  Repeat BC, no growth to date   Continue IV Vanco and Rocephin   ID consulted       Repeated Blood cx NGTD     History of  stroke  History of Stroke with  Left sided weakness   Continue Statin   Lipid panel   Hold Plavix for possible surgery     Hypertension    Continue home meds   Hydralazine IV PRN     Diabetes mellitus  Patient's FSGs are uncontrolled due to hyperglycemia on current medication regimen.  Last A1c reviewed-   Lab Results   Component Value Date    HGBA1C 9.5 (H) 09/14/2022     Most recent fingerstick glucose reviewed-   Recent Labs   Lab 09/17/22  1701 09/17/22  1938 09/18/22  0622 09/18/22  1140   POCTGLUCOSE 260* 262* 253* 248*     Current correctional scale  Medium  Maintain anti-hyperglycemic dose as follows-   Antihyperglycemics (From admission, onward)    Start     Stop Route Frequency Ordered    09/17/22 2100  insulin detemir U-100 pen 30 Units         -- SubQ Nightly 09/17/22 1339    09/14/22 1758  insulin aspart U-100 pen 1-10 Units         -- SubQ Before meals & nightly PRN 09/14/22 1658        Hold Oral hypoglycemics while patient is in the hospital.    Diabetic gastroparesis  Patient's FSGs are uncontrolled due to hyperglycemia on current medication regimen.  Last A1c reviewed-   Lab Results   Component Value Date    HGBA1C 9.5 (H) 09/14/2022     Most recent fingerstick glucose reviewed-   Recent Labs   Lab 09/17/22  1701 09/17/22  1938 09/18/22  0622 09/18/22  1140   POCTGLUCOSE 260* 262* 253* 248*     Current correctional scale  Medium  Maintain anti-hyperglycemic dose as follows-   Antihyperglycemics (From admission, onward)    Start     Stop Route Frequency Ordered    09/17/22 2100  insulin detemir U-100 pen 30 Units         -- SubQ Nightly 09/17/22 1339    09/14/22 1758  insulin aspart U-100 pen 1-10 Units         -- SubQ Before meals & nightly PRN 09/14/22 1658        Hold Oral hypoglycemics while patient is in the hospital.    Antemetic as needed       VTE Risk Mitigation (From admission, onward)         Ordered     IP VTE HIGH RISK PATIENT  Once         09/14/22 1652     Place sequential compression  device  Until discontinued         09/14/22 8240                Discharge Planning   ZECHARIAH:      Code Status: Full Code   Is the patient medically ready for discharge?:     Reason for patient still in hospital (select all that apply): Treatment  Discharge Plan A: Home Health                  Evens Mccauley MD  Department of Hospital Medicine   '[place] - Telemetry ([facility])

## 2022-09-18 NOTE — ASSESSMENT & PLAN NOTE
Patient's FSGs are uncontrolled due to hyperglycemia on current medication regimen.  Last A1c reviewed-   Lab Results   Component Value Date    HGBA1C 9.5 (H) 09/14/2022     Most recent fingerstick glucose reviewed-   Recent Labs   Lab 09/17/22  1701 09/17/22  1938 09/18/22  0622 09/18/22  1140   POCTGLUCOSE 260* 262* 253* 248*     Current correctional scale  Medium  Maintain anti-hyperglycemic dose as follows-   Antihyperglycemics (From admission, onward)    Start     Stop Route Frequency Ordered    09/17/22 2100  insulin detemir U-100 pen 30 Units         -- SubQ Nightly 09/17/22 1339    09/14/22 1758  insulin aspart U-100 pen 1-10 Units         -- SubQ Before meals & nightly PRN 09/14/22 1658        Hold Oral hypoglycemics while patient is in the hospital.    Antemetic as needed

## 2022-09-18 NOTE — ASSESSMENT & PLAN NOTE
Patient's FSGs are uncontrolled due to hyperglycemia on current medication regimen.  Last A1c reviewed-   Lab Results   Component Value Date    HGBA1C 9.5 (H) 09/14/2022     Most recent fingerstick glucose reviewed-   Recent Labs   Lab 09/17/22  1701 09/17/22  1938 09/18/22  0622 09/18/22  1140   POCTGLUCOSE 260* 262* 253* 248*     Current correctional scale  Medium  Maintain anti-hyperglycemic dose as follows-   Antihyperglycemics (From admission, onward)    Start     Stop Route Frequency Ordered    09/17/22 2100  insulin detemir U-100 pen 30 Units         -- SubQ Nightly 09/17/22 1339    09/14/22 1758  insulin aspart U-100 pen 1-10 Units         -- SubQ Before meals & nightly PRN 09/14/22 1658        Hold Oral hypoglycemics while patient is in the hospital.

## 2022-09-19 LAB
ALBUMIN SERPL BCP-MCNC: 2.1 G/DL (ref 3.5–5.2)
ALP SERPL-CCNC: 89 U/L (ref 55–135)
ALT SERPL W/O P-5'-P-CCNC: 23 U/L (ref 10–44)
ANION GAP SERPL CALC-SCNC: 11 MMOL/L (ref 8–16)
AST SERPL-CCNC: 25 U/L (ref 10–40)
BACTERIA SPEC AEROBE CULT: ABNORMAL
BACTERIA TISS AEROBE CULT: ABNORMAL
BASOPHILS # BLD AUTO: 0.08 K/UL (ref 0–0.2)
BASOPHILS NFR BLD: 0.5 % (ref 0–1.9)
BILIRUB SERPL-MCNC: 0.6 MG/DL (ref 0.1–1)
BUN SERPL-MCNC: 9 MG/DL (ref 6–20)
CALCIUM SERPL-MCNC: 7.9 MG/DL (ref 8.7–10.5)
CHLORIDE SERPL-SCNC: 99 MMOL/L (ref 95–110)
CO2 SERPL-SCNC: 26 MMOL/L (ref 23–29)
CREAT SERPL-MCNC: 1.5 MG/DL (ref 0.5–1.4)
DIFFERENTIAL METHOD: ABNORMAL
EOSINOPHIL # BLD AUTO: 0.1 K/UL (ref 0–0.5)
EOSINOPHIL NFR BLD: 0.8 % (ref 0–8)
ERYTHROCYTE [DISTWIDTH] IN BLOOD BY AUTOMATED COUNT: 12.6 % (ref 11.5–14.5)
EST. GFR  (NO RACE VARIABLE): 56 ML/MIN/1.73 M^2
GLUCOSE SERPL-MCNC: 193 MG/DL (ref 70–110)
GRAM STN SPEC: ABNORMAL
GRAM STN SPEC: ABNORMAL
HCT VFR BLD AUTO: 30.9 % (ref 40–54)
HGB BLD-MCNC: 9.7 G/DL (ref 14–18)
IMM GRANULOCYTES # BLD AUTO: 0.23 K/UL (ref 0–0.04)
IMM GRANULOCYTES NFR BLD AUTO: 1.5 % (ref 0–0.5)
LYMPHOCYTES # BLD AUTO: 2.2 K/UL (ref 1–4.8)
LYMPHOCYTES NFR BLD: 14.2 % (ref 18–48)
MCH RBC QN AUTO: 28.4 PG (ref 27–31)
MCHC RBC AUTO-ENTMCNC: 31.4 G/DL (ref 32–36)
MCV RBC AUTO: 90 FL (ref 82–98)
MONOCYTES # BLD AUTO: 1.3 K/UL (ref 0.3–1)
MONOCYTES NFR BLD: 8.1 % (ref 4–15)
NEUTROPHILS # BLD AUTO: 11.7 K/UL (ref 1.8–7.7)
NEUTROPHILS NFR BLD: 74.9 % (ref 38–73)
NRBC BLD-RTO: 0 /100 WBC
PLATELET # BLD AUTO: 483 K/UL (ref 150–450)
PMV BLD AUTO: 8.3 FL (ref 9.2–12.9)
POCT GLUCOSE: 183 MG/DL (ref 70–110)
POCT GLUCOSE: 191 MG/DL (ref 70–110)
POCT GLUCOSE: 197 MG/DL (ref 70–110)
POCT GLUCOSE: 289 MG/DL (ref 70–110)
POTASSIUM SERPL-SCNC: 3.5 MMOL/L (ref 3.5–5.1)
PROT SERPL-MCNC: 6.6 G/DL (ref 6–8.4)
RBC # BLD AUTO: 3.42 M/UL (ref 4.6–6.2)
SODIUM SERPL-SCNC: 136 MMOL/L (ref 136–145)
VANCOMYCIN SERPL-MCNC: 18.3 UG/ML
WBC # BLD AUTO: 15.57 K/UL (ref 3.9–12.7)

## 2022-09-19 PROCEDURE — 63600175 PHARM REV CODE 636 W HCPCS: Performed by: PODIATRIST

## 2022-09-19 PROCEDURE — 80053 COMPREHEN METABOLIC PANEL: CPT | Performed by: INTERNAL MEDICINE

## 2022-09-19 PROCEDURE — 80202 ASSAY OF VANCOMYCIN: CPT | Performed by: INTERNAL MEDICINE

## 2022-09-19 PROCEDURE — 99223 PR INITIAL HOSPITAL CARE,LEVL III: ICD-10-PCS | Mod: NSCH,,, | Performed by: INTERNAL MEDICINE

## 2022-09-19 PROCEDURE — 36415 COLL VENOUS BLD VENIPUNCTURE: CPT | Performed by: INTERNAL MEDICINE

## 2022-09-19 PROCEDURE — C1751 CATH, INF, PER/CENT/MIDLINE: HCPCS

## 2022-09-19 PROCEDURE — 99223 1ST HOSP IP/OBS HIGH 75: CPT | Mod: NSCH,,, | Performed by: INTERNAL MEDICINE

## 2022-09-19 PROCEDURE — 97530 THERAPEUTIC ACTIVITIES: CPT | Mod: CQ

## 2022-09-19 PROCEDURE — 25000003 PHARM REV CODE 250: Performed by: PODIATRIST

## 2022-09-19 PROCEDURE — 21400001 HC TELEMETRY ROOM

## 2022-09-19 PROCEDURE — 63600175 PHARM REV CODE 636 W HCPCS: Performed by: INTERNAL MEDICINE

## 2022-09-19 PROCEDURE — 85025 COMPLETE CBC W/AUTO DIFF WBC: CPT | Performed by: INTERNAL MEDICINE

## 2022-09-19 PROCEDURE — 97535 SELF CARE MNGMENT TRAINING: CPT

## 2022-09-19 PROCEDURE — 97116 GAIT TRAINING THERAPY: CPT | Mod: CQ

## 2022-09-19 PROCEDURE — 25000003 PHARM REV CODE 250: Performed by: INTERNAL MEDICINE

## 2022-09-19 PROCEDURE — 97530 THERAPEUTIC ACTIVITIES: CPT

## 2022-09-19 PROCEDURE — 36569 INSJ PICC 5 YR+ W/O IMAGING: CPT

## 2022-09-19 RX ORDER — CEFAZOLIN SODIUM 2 G/50ML
2 SOLUTION INTRAVENOUS
Status: DISCONTINUED | OUTPATIENT
Start: 2022-09-19 | End: 2022-09-23 | Stop reason: HOSPADM

## 2022-09-19 RX ORDER — CLOPIDOGREL BISULFATE 75 MG/1
75 TABLET ORAL DAILY
Status: DISCONTINUED | OUTPATIENT
Start: 2022-09-19 | End: 2022-09-23 | Stop reason: HOSPADM

## 2022-09-19 RX ORDER — CEFEPIME HYDROCHLORIDE 1 G/50ML
2 INJECTION, SOLUTION INTRAVENOUS
Status: DISCONTINUED | OUTPATIENT
Start: 2022-09-19 | End: 2022-09-19

## 2022-09-19 RX ADMIN — INSULIN ASPART 2 UNITS: 100 INJECTION, SOLUTION INTRAVENOUS; SUBCUTANEOUS at 05:09

## 2022-09-19 RX ADMIN — VANCOMYCIN HYDROCHLORIDE 1500 MG: 1.5 INJECTION, POWDER, LYOPHILIZED, FOR SOLUTION INTRAVENOUS at 08:09

## 2022-09-19 RX ADMIN — METRONIDAZOLE 500 MG: 500 TABLET ORAL at 09:09

## 2022-09-19 RX ADMIN — AMLODIPINE BESYLATE 10 MG: 10 TABLET ORAL at 08:09

## 2022-09-19 RX ADMIN — INSULIN ASPART 1 UNITS: 100 INJECTION, SOLUTION INTRAVENOUS; SUBCUTANEOUS at 08:09

## 2022-09-19 RX ADMIN — CEFAZOLIN SODIUM 2 G: 2 SOLUTION INTRAVENOUS at 08:09

## 2022-09-19 RX ADMIN — INSULIN ASPART 2 UNITS: 100 INJECTION, SOLUTION INTRAVENOUS; SUBCUTANEOUS at 04:09

## 2022-09-19 RX ADMIN — CLOPIDOGREL 75 MG: 75 TABLET, FILM COATED ORAL at 11:09

## 2022-09-19 RX ADMIN — SODIUM CHLORIDE: 0.9 INJECTION, SOLUTION INTRAVENOUS at 08:09

## 2022-09-19 RX ADMIN — CEFEPIME HYDROCHLORIDE 1 G: 1 INJECTION, SOLUTION INTRAVENOUS at 05:09

## 2022-09-19 RX ADMIN — METRONIDAZOLE 500 MG: 500 TABLET ORAL at 05:09

## 2022-09-19 RX ADMIN — INSULIN ASPART 6 UNITS: 100 INJECTION, SOLUTION INTRAVENOUS; SUBCUTANEOUS at 12:09

## 2022-09-19 RX ADMIN — ONDANSETRON 4 MG: 2 INJECTION INTRAMUSCULAR; INTRAVENOUS at 10:09

## 2022-09-19 RX ADMIN — ONDANSETRON 4 MG: 2 INJECTION INTRAMUSCULAR; INTRAVENOUS at 12:09

## 2022-09-19 RX ADMIN — SODIUM CHLORIDE: 0.9 INJECTION, SOLUTION INTRAVENOUS at 12:09

## 2022-09-19 RX ADMIN — ATORVASTATIN CALCIUM 20 MG: 10 TABLET, FILM COATED ORAL at 08:09

## 2022-09-19 RX ADMIN — METRONIDAZOLE 500 MG: 500 TABLET ORAL at 01:09

## 2022-09-19 RX ADMIN — CEFEPIME HYDROCHLORIDE 2 G: 2 INJECTION, SOLUTION INTRAVENOUS at 01:09

## 2022-09-19 NOTE — ASSESSMENT & PLAN NOTE
Patient's FSGs are uncontrolled due to hyperglycemia on current medication regimen.  Last A1c reviewed-   Lab Results   Component Value Date    HGBA1C 9.5 (H) 09/14/2022     Most recent fingerstick glucose reviewed-   Recent Labs   Lab 09/18/22  1140 09/18/22  1518 09/18/22  2108 09/19/22  0542   POCTGLUCOSE 248* 181* 230* 191*     Current correctional scale  Medium  Maintain anti-hyperglycemic dose as follows-   Antihyperglycemics (From admission, onward)    Start     Stop Route Frequency Ordered    09/17/22 2100  insulin detemir U-100 pen 30 Units         -- SubQ Nightly 09/17/22 1339    09/14/22 1758  insulin aspart U-100 pen 1-10 Units         -- SubQ Before meals & nightly PRN 09/14/22 1658        Hold Oral hypoglycemics while patient is in the hospital.

## 2022-09-19 NOTE — PROCEDURES
"Henry Fernandez is a 52 y.o. male patient.    Temp: 99.2 °F (37.3 °C) (09/19/22 1503)  Pulse: 90 (09/19/22 1503)  Resp: 18 (09/19/22 1503)  BP: (!) 155/81 (09/19/22 1503)  SpO2: 96 % (09/19/22 1503)  Weight: 103 kg (227 lb 1.2 oz) (09/19/22 0600)  Height: 6' 2" (188 cm) (09/14/22 1300)    PICC  Date/Time: 9/19/2022 3:33 PM  Performed by: Livan Kearney RN  Supervising provider: Paget A Freyou, RN  Consent Done: Yes  Time out: Immediately prior to procedure a time out was called to verify the correct patient, procedure, equipment, support staff and site/side marked as required  Indications: med administration and vascular access  Anesthesia: local infiltration  Local anesthetic: lidocaine 1% without epinephrine  Anesthetic Total (mL): 3  Preparation: skin prepped with ChloraPrep  Skin prep agent dried: skin prep agent completely dried prior to procedure  Sterile barriers: all five maximum sterile barriers used - cap, mask, sterile gown, sterile gloves, and large sterile sheet  Hand hygiene: hand hygiene performed prior to central venous catheter insertion  Location details: right basilic  Catheter type: double lumen  Catheter size: 5 Fr  Catheter Length: 41cm    Ultrasound guidance: yes  Vessel Caliber: medium and patent, compressibility normal  Vascular Doppler: not done  Needle advanced into vessel with real time Ultrasound guidance.  Guidewire confirmed in vessel.  Sterile sheath used.  no esophageal manometryNumber of attempts: 1  Post-procedure: blood return through all ports, chlorhexidine patch and sterile dressing applied  Estimated blood loss (mL): 0  Specimens: No  Implants: No  Assessment: placement verified by x-ray (see rad. report)  Complications: none  Comments: Do not use until placement verified by MD        Name Livan Kearney RN  9/19/2022    "

## 2022-09-19 NOTE — PROGRESS NOTES
[facility] - Telemetry ([facility])  [facility]  Progress Note    Patient Name: Henry Fernandez  MRN: [id number]  Patient Class: IP- Inpatient   Admission Date: 9/14/2022  Length of Stay: 5 days  Attending Physician: Evens Mccauley, *  Primary Care Provider: No Maher MD        Subjective:     Principal Problem:Diabetic ulcer of right foot        HPI:  Mr Fernandez is a 52 year old male with PMHx of HTN, Stroke, DM, HLD and gastroparesis and cholecystectomy who presents to [facility] from [facility] for further treatment of Right diabetic foot wound. He ws transferred for Podiatry and Infectious Disease services. Denies associated symptoms of chest pain, shortness of breath, fever or chills. According to [facility]  MD, Blood cultures at previous hospital positive for Staph schleiferi. He was being treated with Rocephin and Vancomycin.  MRI of the RLE showed an 8.4cm dorsal fluid collection, with a possibilty of osteo. Patient is a full code, sighficant other and mother are surrogate Decision maker. Patient is being admitted under the care of [facility],.       Overview/Hospital Course:  Patient is a 52 year old male who presented to [facility] from [facility] for Podiatry and Infectious Disease Services. Podiatry following, patient under I & D at bedside last night. He was taken for surgery today and underwent I & D, Debridement and bone biopsy. Post procedure, patient doing well, vital signs stable. Will continue IV antibiotics, possible wound vac in 1-2 days.     9/16 He denies any complaint for today . He is s/p I&D of Right foot Diabetic wound and abscess  per Podiatrist . The WBC is trending up and IVAB change fron rocephin to Cefepiem and metronidazol ID consulted .     9/17 he is complaining of nause /vomiting . The wound cx are (+) for staph . The kidney fucntion is improving . Cont IVAB  and wound care     9/18 Wond cx (+) for staph . NAEON . CM  consulted for d/c planning . Will need 6 week IVAB .     9/19  ID consulted . MALISSAON . Plan  for a  PICC line today  . The Blood  Cx are NGTD . No new complaint .           Interval History:     Review of Systems   Constitutional:  Positive for activity change and chills. Negative for diaphoresis, fatigue and fever.   HENT:  Negative for congestion, ear discharge, rhinorrhea, sinus pressure, sore throat and trouble swallowing.    Eyes:  Negative for discharge and visual disturbance.   Respiratory:  Negative for apnea, cough, choking, chest tightness, shortness of breath, wheezing and stridor.    Cardiovascular:  Positive for leg swelling (right leg swelling). Negative for chest pain and palpitations.   Gastrointestinal:  Negative for abdominal distention, abdominal pain, diarrhea, nausea and vomiting.   Endocrine: Negative for cold intolerance and heat intolerance.   Genitourinary:  Negative for dysuria, frequency and hematuria.   Musculoskeletal:  Negative for arthralgias, back pain, myalgias and neck pain.        Left side weakness    Skin:  Positive for wound (right foot diabetic wound). Negative for pallor and rash.   Neurological:  Positive for weakness. Negative for dizziness, seizures, syncope and headaches.   Psychiatric/Behavioral:  Negative for agitation, confusion and sleep disturbance.    Objective:     Vital Signs (Most Recent):  Temp: 98.8 °F (37.1 °C) (09/19/22 0749)  Pulse: 92 (09/19/22 0749)  Resp: 18 (09/19/22 0749)  BP: (!) 144/85 (09/19/22 0749)  SpO2: 95 % (09/19/22 0749)   Vital Signs (24h Range):  Temp:  [97.7 °F (36.5 °C)-99.8 °F (37.7 °C)] 98.8 °F (37.1 °C)  Pulse:  [84-92] 92  Resp:  [18] 18  SpO2:  [82 %-98 %] 95 %  BP: (144-181)/(72-95) 144/85     Weight: 103 kg (227 lb 1.2 oz)  Body mass index is 29.15 kg/m².    Intake/Output Summary (Last 24 hours) at 9/19/2022 1045  Last data filed at 9/19/2022 0730  Gross per 24 hour   Intake --   Output 1600 ml   Net -1600 ml      Physical  Exam  Vitals and nursing note reviewed.   Eyes:      General:         Right eye: No discharge.         Left eye: No discharge.   Cardiovascular:      Heart sounds: No murmur heard.    No friction rub. No gallop.   Pulmonary:      Effort: No respiratory distress.      Breath sounds: No stridor. No wheezing or rhonchi.   Chest:      Chest wall: No tenderness.   Abdominal:      General: There is no distension.      Palpations: There is no mass.      Tenderness: There is no abdominal tenderness. There is no right CVA tenderness, left CVA tenderness, guarding or rebound.      Hernia: No hernia is present.   Musculoskeletal:         General: No swelling, tenderness, deformity or signs of injury.      Right lower le+ Edema present.      Left lower leg: No edema.      Comments: Rt foot with kerlix dressing    Skin:     Coloration: Skin is not jaundiced or pale.      Findings: No bruising, erythema, lesion or rash.       Significant Labs: All pertinent labs within the past 24 hours have been reviewed.      Significant Imaging: I have reviewed all pertinent imaging results/findings within the past 24 hours.        Assessment/Plan:      * Diabetic ulcer of right foot with abscess and medial cuneiform osteomyelitis   S/P I&D per  Podiatrist   S/P Bone cx   Wound cx (+) for staph  Cont IVAB  Will need 6 week of IVAB        CKD (chronic kidney disease) stage 3, GFR 30-59 ml/min    Review of some previous labs, looks like renal function around baseline   Will start gentle hydration and assess response     stable     Charcot's joint of foot, right  Podiatry following       Abscess of foot without toes, right  9/15   S/P- I & D, Debridement and bone biopsy today   Monitor wound cultures   Possible wound vac in 1-2 days   Continue IV antibiotic  ID consulted          Bacteremia  Blood culture at previous hospital positive for Staph Schleiferi   Repeat Blood culture   Consult ID   Continue Vanco and Rocephin        9/15/2022  Repeat BC, no growth to date   Continue IV Vanco and Rocephin   ID consulted       Repeated Blood cx NGTD     History of stroke  History of Stroke with  Left sided weakness   Continue Statin   Lipid panel   Hold Plavix for possible surgery       Resume plavix    Hypertension    Continue home meds   Hydralazine IV PRN     Diabetes mellitus  Patient's FSGs are uncontrolled due to hyperglycemia on current medication regimen.  Last A1c reviewed-   Lab Results   Component Value Date    HGBA1C 9.5 (H) 09/14/2022     Most recent fingerstick glucose reviewed-   Recent Labs   Lab 09/18/22  1140 09/18/22  1518 09/18/22  2108 09/19/22  0542   POCTGLUCOSE 248* 181* 230* 191*     Current correctional scale  Medium  Maintain anti-hyperglycemic dose as follows-   Antihyperglycemics (From admission, onward)    Start     Stop Route Frequency Ordered    09/17/22 2100  insulin detemir U-100 pen 30 Units         -- SubQ Nightly 09/17/22 1339    09/14/22 1758  insulin aspart U-100 pen 1-10 Units         -- SubQ Before meals & nightly PRN 09/14/22 1658        Hold Oral hypoglycemics while patient is in the hospital.    Diabetic gastroparesis  Patient's FSGs are uncontrolled due to hyperglycemia on current medication regimen.  Last A1c reviewed-   Lab Results   Component Value Date    HGBA1C 9.5 (H) 09/14/2022     Most recent fingerstick glucose reviewed-   Recent Labs   Lab 09/18/22  1140 09/18/22  1518 09/18/22  2108 09/19/22  0542   POCTGLUCOSE 248* 181* 230* 191*     Current correctional scale  Medium  Maintain anti-hyperglycemic dose as follows-   Antihyperglycemics (From admission, onward)    Start     Stop Route Frequency Ordered    09/17/22 2100  insulin detemir U-100 pen 30 Units         -- SubQ Nightly 09/17/22 1339    09/14/22 1758  insulin aspart U-100 pen 1-10 Units         -- SubQ Before meals & nightly PRN 09/14/22 1658        Hold Oral hypoglycemics while patient is in the hospital.    Antemetic as needed        VTE Risk Mitigation (From admission, onward)         Ordered     IP VTE HIGH RISK PATIENT  Once         09/14/22 1652     Place sequential compression device  Until discontinued         09/14/22 1652                Discharge Planning   ZECHARIAH:      Code Status: Full Code   Is the patient medically ready for discharge?:     Reason for patient still in hospital (select all that apply): Treatment  Discharge Plan A: [facility]                  Evenserica Mccauley MD  Department of Hospital Medicine   [facility] - Telemetry ([facility])

## 2022-09-19 NOTE — ASSESSMENT & PLAN NOTE
Patient's FSGs are uncontrolled due to hyperglycemia on current medication regimen.  Last A1c reviewed-   Lab Results   Component Value Date    HGBA1C 9.5 (H) 09/14/2022     Most recent fingerstick glucose reviewed-   Recent Labs   Lab 09/18/22  1140 09/18/22  1518 09/18/22  2108 09/19/22  0542   POCTGLUCOSE 248* 181* 230* 191*     Current correctional scale  Medium  Maintain anti-hyperglycemic dose as follows-   Antihyperglycemics (From admission, onward)    Start     Stop Route Frequency Ordered    09/17/22 2100  insulin detemir U-100 pen 30 Units         -- SubQ Nightly 09/17/22 1339    09/14/22 1758  insulin aspart U-100 pen 1-10 Units         -- SubQ Before meals & nightly PRN 09/14/22 1658        Hold Oral hypoglycemics while patient is in the hospital.    Antemetic as needed

## 2022-09-19 NOTE — SUBJECTIVE & OBJECTIVE
Interval History:     Review of Systems   Constitutional:  Positive for activity change and chills. Negative for diaphoresis, fatigue and fever.   HENT:  Negative for congestion, ear discharge, rhinorrhea, sinus pressure, sore throat and trouble swallowing.    Eyes:  Negative for discharge and visual disturbance.   Respiratory:  Negative for apnea, cough, choking, chest tightness, shortness of breath, wheezing and stridor.    Cardiovascular:  Positive for leg swelling (right leg swelling). Negative for chest pain and palpitations.   Gastrointestinal:  Negative for abdominal distention, abdominal pain, diarrhea, nausea and vomiting.   Endocrine: Negative for cold intolerance and heat intolerance.   Genitourinary:  Negative for dysuria, frequency and hematuria.   Musculoskeletal:  Negative for arthralgias, back pain, myalgias and neck pain.        Left side weakness    Skin:  Positive for wound (right foot diabetic wound). Negative for pallor and rash.   Neurological:  Positive for weakness. Negative for dizziness, seizures, syncope and headaches.   Psychiatric/Behavioral:  Negative for agitation, confusion and sleep disturbance.    Objective:     Vital Signs (Most Recent):  Temp: 98.8 °F (37.1 °C) (09/19/22 0749)  Pulse: 92 (09/19/22 0749)  Resp: 18 (09/19/22 0749)  BP: (!) 144/85 (09/19/22 0749)  SpO2: 95 % (09/19/22 0749)   Vital Signs (24h Range):  Temp:  [97.7 °F (36.5 °C)-99.8 °F (37.7 °C)] 98.8 °F (37.1 °C)  Pulse:  [84-92] 92  Resp:  [18] 18  SpO2:  [82 %-98 %] 95 %  BP: (144-181)/(72-95) 144/85     Weight: 103 kg (227 lb 1.2 oz)  Body mass index is 29.15 kg/m².    Intake/Output Summary (Last 24 hours) at 9/19/2022 1045  Last data filed at 9/19/2022 0730  Gross per 24 hour   Intake --   Output 1600 ml   Net -1600 ml      Physical Exam  Vitals and nursing note reviewed.   Eyes:      General:         Right eye: No discharge.         Left eye: No discharge.   Cardiovascular:      Heart sounds: No murmur heard.     No friction rub. No gallop.   Pulmonary:      Effort: No respiratory distress.      Breath sounds: No stridor. No wheezing or rhonchi.   Chest:      Chest wall: No tenderness.   Abdominal:      General: There is no distension.      Palpations: There is no mass.      Tenderness: There is no abdominal tenderness. There is no right CVA tenderness, left CVA tenderness, guarding or rebound.      Hernia: No hernia is present.   Musculoskeletal:         General: No swelling, tenderness, deformity or signs of injury.      Right lower le+ Edema present.      Left lower leg: No edema.      Comments: Rt foot with kerlix dressing    Skin:     Coloration: Skin is not jaundiced or pale.      Findings: No bruising, erythema, lesion or rash.       Significant Labs: All pertinent labs within the past 24 hours have been reviewed.      Significant Imaging: I have reviewed all pertinent imaging results/findings within the past 24 hours.

## 2022-09-19 NOTE — CONSULTS
[facility])  Infectious Disease  Consult Note    Patient Name: Henry Fernandez  MRN: [id number]  Admission Date: 9/14/2022  Hospital Length of Stay: 5 days  Attending Physician: Evens Mccauley, *  Primary Care Provider: No Maher MD     Isolation Status: No active isolations    Patient information was obtained from patient, past medical records and ER records.      Consults  Assessment/Plan:     * Diabetic ulcer of right foot with abscess and medial cuneiform osteomyelitis   Will plan to treat for 6 weeks with Daptomycin/Cefepime /flagyl -will use cultures to guide regime and make final antibiotic plan   Final plan will follow drug susceptibility of organism     CKD (chronic kidney disease) stage 3, GFR 30-59 ml/min  Will avoid nephrotoxic meds, monitor serum creatine     Hypertension  BP control as per primary team    Diabetes mellitus  Diabetic diet ,  Insulin sliding scale         Thank you for your consult. I will follow-up with patient. Please contact us if you have any additional questions.    Santiago Fuller MD, FACP  Infectious Disease  [facility])    Subjective:     Principal Problem: Diabetic ulcer of right foot    HPI:   52 year old male with PMHx of HTN, Stroke, DM, HLD and gastroparesis and cholecystectomy who presents to [facility] from [facility] for further treatment of Right diabetic foot wound.  Initial blood culture at outside facility - Staph schleiferi.   MRI of the RLE showed an 8.4cm dorsal fluid collection.  Wound cultures -09/15- staph aureus   He was seen by podiatry and had on 09/15-   Incision and drainage of right foot  2. Bone biopsy, right medial cuneiform\  Component      Latest Ref Rng & Units 9/19/2022 9/18/2022 9/16/2022   WBC      3.90 - 12.70 K/uL 15.57 (H) 14.58 (H) 30.83 (H)       Past Medical History:   Diagnosis Date    Diabetes mellitus     Gastroparesis     Hypertension     Stroke        Past Surgical History:    Procedure Laterality Date    BONE BIOPSY Right 9/15/2022    Procedure: BIOPSY, BONE;  Surgeon: Tatyana Siddiqui DPM;  Location: [facility] OR;  Service: Podiatry;  Laterality: Right;    CHOLECYSTECTOMY      DEBRIDEMENT OF FOOT Right 9/15/2022    Procedure: DEBRIDEMENT, FOOT;  Surgeon: Tatyana Siddiqui DPM;  Location: [facility] OR;  Service: Podiatry;  Laterality: Right;    INCISION AND DRAINAGE FOOT Right 9/15/2022    Procedure: INCISION AND DRAINAGE, FOOT;  Surgeon: Tatyana Siddiqui DPM;  Location: [facility] OR;  Service: Podiatry;  Laterality: Right;       Review of patient's allergies indicates:  No Known Allergies    Medications:  Medications Prior to Admission   Medication Sig    amLODIPine (NORVASC) 10 MG tablet Take 10 mg by mouth once daily.    atorvastatin (LIPITOR) 20 MG tablet atorvastatin 20 mg tablet    clopidogreL (PLAVIX) 75 mg tablet clopidogrel 75 mg tablet    insulin aspart U-100 (NOVOLOG) 100 unit/mL injection Inject 25 Units into the skin 3 (three) times daily before meals.    metoclopramide HCl (REGLAN) 10 MG tablet Take 1 tablet (10 mg total) by mouth 4 (four) times daily.    ondansetron (ZOFRAN) 4 MG tablet Take 1 tablet (4 mg total) by mouth every 8 (eight) hours as needed for Nausea.    insulin glargine (LANTUS SOLOSTAR) 100 unit/mL (3 mL) InPn pen Inject 20 Units into the skin every evening. (Patient taking differently: Inject 50 Units into the skin every evening.)    insulin lispro (ADMELOG U-100 INSULIN LISPRO) 100 unit/mL injection 25 Units 3 (three) times daily after meals.    lisinopril (PRINIVIL,ZESTRIL) 20 MG tablet Take 1 tablet (20 mg total) by mouth once daily. (Patient taking differently: Take 40 mg by mouth once daily.)    ondansetron (ZOFRAN) 4 MG tablet Take 1 tablet (4 mg total) by mouth every 8 (eight) hours as needed.    ondansetron (ZOFRAN-ODT) 4 MG TbDL Take 1 tablet (4 mg total) by mouth every 8 (eight) hours as needed.    promethazine (PHENERGAN) 12.5 MG Tab Take 1  tablet (12.5 mg total) by mouth every 6 (six) hours as needed.    promethazine (PHENERGAN) 25 MG suppository Place 1 suppository (25 mg total) rectally every 6 (six) hours as needed for Nausea.    promethazine (PHENERGAN) 25 MG tablet Take 1 tablet (25 mg total) by mouth every 6 (six) hours as needed for Nausea.     Antibiotics (From admission, onward)      Start     Stop Route Frequency Ordered    09/19/22 0815  vancomycin 1.5 g in dextrose 5 % 250 mL IVPB (ready to mix)         -- IV Once 09/19/22 0701    09/16/22 1400  metroNIDAZOLE tablet 500 mg         -- Oral Every 8 hours 09/16/22 1244    09/16/22 1345  cefepime in dextrose 5 % 1 gram/50 mL IVPB 1 g         -- IV Every 8 hours (non-standard times) 09/16/22 1244    09/14/22 1732  vancomycin - pharmacy to dose  (vancomycin IVPB)        See Josh for full Linked Orders Report.    -- IV pharmacy to manage frequency 09/14/22 1633          Antifungals (From admission, onward)      None          Antivirals (From admission, onward)      None             There is no immunization history for the selected administration types on file for this patient.    Family History       Problem Relation (Age of Onset)    No Known Problems Mother, Father          Social History     Socioeconomic History    Marital status: Significant Other   Tobacco Use    Smoking status: Never    Smokeless tobacco: Never   Substance and Sexual Activity    Alcohol use: No    Drug use: No    Sexual activity: Yes     Partners: Female     Review of Systems   Constitutional:  Positive for activity change and chills. Negative for diaphoresis, fatigue and fever.   HENT:  Negative for congestion, ear discharge, rhinorrhea, sinus pressure, sore throat and trouble swallowing.    Eyes:  Negative for discharge and visual disturbance.   Respiratory:  Negative for apnea, cough, choking, chest tightness, shortness of breath, wheezing and stridor.    Cardiovascular:  Positive for leg swelling (right  leg swelling). Negative for chest pain and palpitations.   Gastrointestinal:  Negative for abdominal distention, abdominal pain, diarrhea, nausea and vomiting.   Endocrine: Negative for cold intolerance and heat intolerance.   Genitourinary:  Negative for dysuria, frequency and hematuria.   Musculoskeletal:  Negative for arthralgias, back pain, myalgias and neck pain.        Left side weakness    Skin:  Positive for wound (right foot diabetic wound). Negative for pallor and rash.   Neurological:  Positive for weakness. Negative for dizziness, seizures, syncope and headaches.   Psychiatric/Behavioral:  Negative for agitation, confusion and sleep disturbance.    Objective:     Vital Signs (Most Recent):  Temp: 98.8 °F (37.1 °C) (09/19/22 0749)  Pulse: 92 (09/19/22 0749)  Resp: 18 (09/19/22 0749)  BP: (!) 144/85 (09/19/22 0749)  SpO2: 95 % (09/19/22 0749)   Vital Signs (24h Range):  Temp:  [97.7 °F (36.5 °C)-99.8 °F (37.7 °C)] 98.8 °F (37.1 °C)  Pulse:  [84-92] 92  Resp:  [18] 18  SpO2:  [82 %-98 %] 95 %  BP: (144-181)/(72-95) 144/85     Weight: 103 kg (227 lb 1.2 oz)  Body mass index is 29.15 kg/m².    Estimated Creatinine Clearance: 86.1 mL/min (based on SCr of 1.3 mg/dL).    Physical Exam  Vitals and nursing note reviewed.   Constitutional:       Appearance: He is well-developed.   HENT:      Head: Normocephalic and atraumatic.      Nose: Nose normal.   Eyes:      Comments: PERRL   Cardiovascular:      Rate and Rhythm: Normal rate and regular rhythm.      Heart sounds: Normal heart sounds.   Pulmonary:      Effort: Pulmonary effort is normal. No respiratory distress.      Breath sounds: Normal breath sounds. No wheezing or rales.   Abdominal:      Tenderness: There is no abdominal tenderness.   Musculoskeletal:         General: Normal range of motion.      Cervical back: Normal range of motion and neck supple.      Comments: Right foot dressing noted   Skin:     General: Skin is dry.   Neurological:      Mental  Status: He is alert and oriented to person, place, and time.       Significant Labs: Blood Culture:   Recent Labs   Lab 09/14/22  1726 09/14/22  1918   LABBLOO No Growth to date  No Growth to date  No Growth to date  No Growth to date  No Growth to date No Growth to date  No Growth to date  No Growth to date  No Growth to date  No Growth to date     BMP:   Recent Labs   Lab 09/19/22  0802   *      K 3.5   CL 99   CO2 26   BUN 9   CREATININE 1.5*   CALCIUM 7.9*     CBC:   Recent Labs   Lab 09/18/22  0559 09/19/22  0802   WBC 14.58* 15.57*   HGB 8.6* 9.7*   HCT 26.6* 30.9*    483*     Wound Culture:   Recent Labs   Lab 09/15/22  1247   LABAERO STAPHYLOCOCCUS AUREUS  Few  Susceptibility pending  *     All pertinent labs within the past 24 hours have been reviewed.    Significant Imaging: I have reviewed all pertinent imaging results/findings within the past 24 hours.

## 2022-09-19 NOTE — PT/OT/SLP PROGRESS
Physical Therapy  Treatment    Henry Fernandez   MRN: [id number]   Admitting Diagnosis: Diabetic ulcer of right foot    PT Received On: 09/19/22  PT Start Time: 0935     PT Stop Time: 1000    PT Total Time (min): 25 min       Billable Minutes:  Gait Training 08 and Therapeutic Activity 17    Treatment Type: Treatment  PT/PTA: PTA     PTA Visit Number: 1       General Precautions: Standard, fall  Orthopedic Precautions: N/A (AWAITING CLARIFICATION)   Braces: AFO (AFO TO L FOOT)  Respiratory Status: Room air    Spiritual, Cultural Beliefs, Sabianist Practices, Values that Affect Care: no    Subjective:  Communicated with patient's nurse, Yvette, and completed Epic chart review prior to session.  Patient agreed to PT session.     Pain/Comfort  Pain Rating 1: 0/10    Objective:   Patient found with: telemetry, peripheral IV    Functional Mobility:  Supine > sit EOB: SPV    Seated EOB x5 min total while donning AFO and shoe to L foot     STS from EOB > RW: Min A (VC for hand placement)    10ft x2 trials w/ RW Min-Mod A (highly impulsive; poor safety awareness and RW mgmt skills despite education and cues)    Stand pivot T/F to chair w/ RW: Min A (VC for safety awareness and RW mgmt)    Educated patient on importance of increased tolerance to upright position and direct impact on CV endurance and strength. Patient encouraged to sit up in chair for a minimum of 2 consecutive hours per day. Encouraged patient to perform AROM TE to BLE throughout the day within all available planes of motion. Re enforced importance of utilizing call light to meet needs in room and not attempt to get up without staff assistance. Patient verbalized understanding and agreed to comply.      AM-PAC 6 CLICK MOBILITY  How much help from another person does this patient currently need?   1 = Unable, Total/Dependent Assistance  2 = A lot, Maximum/Moderate Assistance  3 = A little, Minimum/Contact Guard/Supervision  4 = None, Modified  [place]/Independent    Turning over in bed (including adjusting bedclothes, sheets and blankets)?: 4  Sitting down on and standing up from a chair with arms (e.g., wheelchair, bedside commode, etc.): 3  Moving from lying on back to sitting on the side of the bed?: 4  Moving to and from a bed to a chair (including a wheelchair)?: 3  Need to walk in hospital room?: 2  Climbing 3-5 steps with a railing?: 1  Basic Mobility Total Score: 17    AM-PAC Raw Score CMS G-Code Modifier Level of Impairment Assistance   6 % Total / Unable   7 - 9 CM 80 - 100% Maximal Assist   10 - 14 CL 60 - 80% Moderate Assist   15 - 19 CK 40 - 60% Moderate Assist   20 - 22 CJ 20 - 40% Minimal Assist   23 CI 1-20% SBA / CGA   24 CH 0% Independent/ Mod I     Patient left up in chair with call button in reach and nurse notified. Unable to place chair alarm at this time due to technical issue. Nurse aware.    Assessment:  Henry Fernandez is a 52 y.o. male with a medical diagnosis of Diabetic ulcer of right foot and presents with overall decline in functional mobility. Patient would continue to benefit from skilled PT to address functional limitations listed below in order to return to PLOF/decrease caregiver burden.     Rehab identified problem list/impairments: Rehab identified problem list/impairments: weakness, impaired endurance, impaired sensation, impaired self care skills, impaired functional mobility, gait instability, impaired balance, impaired cognition, decreased coordination, decreased lower extremity function, decreased safety awareness, decreased ROM, impaired skin, edema, orthopedic precautions    Rehab potential is fair.    Activity tolerance: Fair    Discharge recommendations: Discharge Facility/Level of Care Needs: nursing facility, skilled     Barriers to discharge:      Equipment recommendations: Equipment Needed After Discharge: other (see comments) (TBD BY NEXT LEVEL OF CARE)     GOALS:   Multidisciplinary Problems        Physical Therapy Goals          Problem: Physical Therapy    Goal Priority Disciplines Outcome Goal Variances Interventions   Physical Therapy Goal     PT, PT/OT      Description: Pt will perform bed mobility independently in order to participate in EOB activity.  Pt will perform transfers independently in order to participate in OOB activity.   Pt will ambulate 50ft CGA with LRAD while maintaining WB status (if any) in order to participate in daily tasks.    Pt will perform w/c mobility 50ft x 2 in order to safety navigate surroundings.                        PLAN:    Patient to be seen 3 x/week  to address the above listed problems via gait training, therapeutic activities, therapeutic exercises  Plan of Care expires: 09/30/22  Plan of Care reviewed with: patient         09/19/2022

## 2022-09-19 NOTE — PROGRESS NOTES
Pharmacist Renal Dose Adjustment Note    Henry Fernandez is a 52 y.o. male being treated with the medication cefepime    Patient Data:    Vital Signs (Most Recent):  Temp: 98.8 °F (37.1 °C) (09/19/22 0749)  Pulse: 92 (09/19/22 0749)  Resp: 18 (09/19/22 0749)  BP: (!) 144/85 (09/19/22 0749)  SpO2: 95 % (09/19/22 0749)   Vital Signs (72h Range):  Temp:  [97.7 °F (36.5 °C)-99.9 °F (37.7 °C)]   Pulse:  [84-99]   Resp:  [16-20]   BP: (124-181)/(63-95)   SpO2:  [82 %-99 %]      Recent Labs   Lab 09/16/22  0452 09/17/22  0446 09/19/22  0802   CREATININE 1.5* 1.3 1.5*     Serum creatinine: 1.5 mg/dL (H) 09/19/22 0802  Estimated creatinine clearance: 74.6 mL/min (A)    Medication: cefepime 1g every 8 hours will be changed to cefepime 2g every 8 hours for bone/joint indication    Pharmacist's Name: Germaine Roberts  Pharmacist's Extension: [phone]

## 2022-09-19 NOTE — ASSESSMENT & PLAN NOTE
History of Stroke with  Left sided weakness   Continue Statin   Lipid panel   Hold Plavix for possible surgery       Resume plavix

## 2022-09-19 NOTE — PT/OT/SLP PROGRESS
Occupational Therapy   Treatment    Name: Henry Fernandez  MRN: [id number]  Admitting Diagnosis:  Diabetic ulcer of right foot  4 Days Post-Op    Recommendations:     Discharge Recommendations: nursing facility, skilled  Discharge Equipment Recommendations:   (TBD at next level of care)  Barriers to discharge:  None    Assessment:     Henry Fernandez is a 52 y.o. male with a medical diagnosis of Diabetic ulcer of right foot.  He presents with the following performance deficits affecting function are weakness, impaired endurance, impaired self care skills, impaired functional mobility, impaired balance, impaired cardiopulmonary response to activity, decreased safety awareness, impaired skin, edema, orthopedic precautions.     Rehab Prognosis:  Fair; patient would benefit from acute skilled OT services to address these deficits and reach maximum level of function.       Plan:     Patient to be seen 2 x/week to address the above listed problems via self-care/home management, therapeutic activities, therapeutic exercises  Plan of Care Expires: 09/30/22  Plan of Care Reviewed with: patient    Subjective     Pain/Comfort:  Pain Rating 1: 0/10    Objective:     Communicated with: NurseYvette, prior to session.  Patient found supine with peripheral IV, telemetry upon OT entry to room.    General Precautions: Standard, fall   Orthopedic Precautions: (awaiting clarification. Dianna PRATER's. Attempted to educate on) NWB, but patient physically unable to comply in standing due to L hemiparesis.  Braces: AFO (L LE)  Respiratory Status: Room air    Bed Mobility:    Patient completed Supine to Sit with stand by assistance and with side rail     Functional Mobility/Transfers:  Patient completed Sit <> Stand Transfer with minimum assistance  with  rolling walker   Patient completed Bed <> Chair Transfer using Step Transfer technique with minimum assistance with rolling walker  Functional Mobility: Patient completed x10ft x2 trials functional  mobility with RW and min A to increase dynamic standing balance and activity tolerance needed for ADL completion.     Activities of Daily Living:  Grooming: setup completed simple grooming in bedside chair with setup for container management. Used R hand for all activities. Increased time for completion and to improve quality.    [facility] 6 Click ADL: 16    Treatment & Education:  Patient tolerated intervention well overall. Cooperative and motivated. Significant difficulties with attempts to maintain NWB to R LE due to L sided baseline deficits from previous CVA. Patient provided with education on and cueing for completion of all transfers to increase independence and decrease fall risk. Patient educated on completion of B UE AROM therex throughout the day and calling for A to transfer back to bed. Stated understanding and in agreement with POC.    Patient left up in chair with all lines intact, call button in reach, and nurse notified. Nurse notified that chair alarm not functioning therefore to set.    GOALS:   Multidisciplinary Problems       Occupational Therapy Goals          Problem: Occupational Therapy    Goal Priority Disciplines Outcome Interventions   Occupational Therapy Goal     OT, PT/OT Ongoing, Progressing    Description: Goals to be met by: 9/30/22     Patient will increase functional independence with ADLs by performing:    LE Dressing with Set-up Assistance to primo shoe and AFO.  Stand pivot transfers with Modified [place].  Upper extremity exercise program x20 reps per handout, with assistance as needed.                         Time Tracking:     OT Date of Treatment: 09/19/22  OT Start Time: 0920  OT Stop Time: 0945  OT Total Time (min): 25 min    Billable Minutes:Self Care/Home Management 10  Therapeutic Activity 15      9/19/2022

## 2022-09-19 NOTE — PLAN OF CARE
Pt remains fall free this shift.  Pt AAOx4, verbal, clear speech.  Skin warm and dry. No new skin issues.  Telemonitoring in progress   Room air  Voids perUrinal  Standby assist with transfers.  CBG AC/ HS with PRN SS insulin.  Bed low, side rails up x 2, wheels locked, call light in reach.  Bed alarm maintained for safety.  Patient instructed to call for assistance.  Hourly rounding completed.  24 hour chart check completed  POC updated and reviewed with pt. Will continue POC.

## 2022-09-19 NOTE — SUBJECTIVE & OBJECTIVE
Past Medical History:   Diagnosis Date    Diabetes mellitus     Gastroparesis     Hypertension     Stroke        Past Surgical History:   Procedure Laterality Date    BONE BIOPSY Right 9/15/2022    Procedure: BIOPSY, BONE;  Surgeon: Tatyana Siddiqui DPM;  Location: [facility] [place] OR;  Service: Podiatry;  Laterality: Right;    CHOLECYSTECTOMY      DEBRIDEMENT OF FOOT Right 9/15/2022    Procedure: DEBRIDEMENT, FOOT;  Surgeon: Tatyana Siddiqui DPM;  Location: [facility] [place] OR;  Service: Podiatry;  Laterality: Right;    INCISION AND DRAINAGE FOOT Right 9/15/2022    Procedure: INCISION AND DRAINAGE, FOOT;  Surgeon: Tatyana Siddiqui DPM;  Location: [facility] [place] OR;  Service: Podiatry;  Laterality: Right;       Review of patient's allergies indicates:  No Known Allergies    Medications:  Medications Prior to Admission   Medication Sig    amLODIPine (NORVASC) 10 MG tablet Take 10 mg by mouth once daily.    atorvastatin (LIPITOR) 20 MG tablet atorvastatin 20 mg tablet    clopidogreL (PLAVIX) 75 mg tablet clopidogrel 75 mg tablet    insulin aspart U-100 (NOVOLOG) 100 unit/mL injection Inject 25 Units into the skin 3 (three) times daily before meals.    metoclopramide HCl (REGLAN) 10 MG tablet Take 1 tablet (10 mg total) by mouth 4 (four) times daily.    ondansetron (ZOFRAN) 4 MG tablet Take 1 tablet (4 mg total) by mouth every 8 (eight) hours as needed for Nausea.    insulin glargine (LANTUS SOLOSTAR) 100 unit/mL (3 mL) InPn pen Inject 20 Units into the skin every evening. (Patient taking differently: Inject 50 Units into the skin every evening.)    insulin lispro (ADMELOG U-100 INSULIN LISPRO) 100 unit/mL injection 25 Units 3 (three) times daily after meals.    lisinopril (PRINIVIL,ZESTRIL) 20 MG tablet Take 1 tablet (20 mg total) by mouth once daily. (Patient taking differently: Take 40 mg by mouth once daily.)    ondansetron (ZOFRAN) 4 MG tablet Take 1 tablet (4 mg total) by mouth every 8 (eight) hours as needed.    ondansetron (ZOFRAN-ODT) 4 MG  TbDL Take 1 tablet (4 mg total) by mouth every 8 (eight) hours as needed.    promethazine (PHENERGAN) 12.5 MG Tab Take 1 tablet (12.5 mg total) by mouth every 6 (six) hours as needed.    promethazine (PHENERGAN) 25 MG suppository Place 1 suppository (25 mg total) rectally every 6 (six) hours as needed for Nausea.    promethazine (PHENERGAN) 25 MG tablet Take 1 tablet (25 mg total) by mouth every 6 (six) hours as needed for Nausea.     Antibiotics (From admission, onward)      Start     Stop Route Frequency Ordered    09/19/22 0815  vancomycin 1.5 g in dextrose 5 % 250 mL IVPB (ready to mix)         -- IV Once 09/19/22 0701    09/16/22 1400  metroNIDAZOLE tablet 500 mg         -- Oral Every 8 hours 09/16/22 1244    09/16/22 1345  cefepime in dextrose 5 % 1 gram/50 mL IVPB 1 g         -- IV Every 8 hours (non-standard times) 09/16/22 1244    09/14/22 1732  vancomycin - pharmacy to dose  (vancomycin IVPB)        See Hyperspace for full Linked Orders Report.    -- IV pharmacy to manage frequency 09/14/22 1633          Antifungals (From admission, onward)      None          Antivirals (From admission, onward)      None             There is no immunization history for the selected administration types on file for this patient.    Family History       Problem Relation (Age of Onset)    No Known Problems Mother, Father          Social History     Socioeconomic History    Marital status: Significant Other   Tobacco Use    Smoking status: Never    Smokeless tobacco: Never   Substance and Sexual Activity    Alcohol use: No    Drug use: No    Sexual activity: Yes     Partners: Female     Review of Systems   Constitutional:  Positive for activity change and chills. Negative for diaphoresis, fatigue and fever.   HENT:  Negative for congestion, ear discharge, rhinorrhea, sinus pressure, sore throat and trouble swallowing.    Eyes:  Negative for discharge and visual disturbance.   Respiratory:  Negative for apnea, cough, choking,  chest tightness, shortness of breath, wheezing and stridor.    Cardiovascular:  Positive for leg swelling (right leg swelling). Negative for chest pain and palpitations.   Gastrointestinal:  Negative for abdominal distention, abdominal pain, diarrhea, nausea and vomiting.   Endocrine: Negative for cold intolerance and heat intolerance.   Genitourinary:  Negative for dysuria, frequency and hematuria.   Musculoskeletal:  Negative for arthralgias, back pain, myalgias and neck pain.        Left side weakness    Skin:  Positive for wound (right foot diabetic wound). Negative for pallor and rash.   Neurological:  Positive for weakness. Negative for dizziness, seizures, syncope and headaches.   Psychiatric/Behavioral:  Negative for agitation, confusion and sleep disturbance.    Objective:     Vital Signs (Most Recent):  Temp: 98.8 °F (37.1 °C) (09/19/22 0749)  Pulse: 92 (09/19/22 0749)  Resp: 18 (09/19/22 0749)  BP: (!) 144/85 (09/19/22 0749)  SpO2: 95 % (09/19/22 0749)   Vital Signs (24h Range):  Temp:  [97.7 °F (36.5 °C)-99.8 °F (37.7 °C)] 98.8 °F (37.1 °C)  Pulse:  [84-92] 92  Resp:  [18] 18  SpO2:  [82 %-98 %] 95 %  BP: (144-181)/(72-95) 144/85     Weight: 103 kg (227 lb 1.2 oz)  Body mass index is 29.15 kg/m².    Estimated Creatinine Clearance: 86.1 mL/min (based on SCr of 1.3 mg/dL).    Physical Exam  Vitals and nursing note reviewed.   Constitutional:       Appearance: He is well-developed.   HENT:      Head: Normocephalic and atraumatic.      Nose: Nose normal.   Eyes:      Comments: PERRL   Cardiovascular:      Rate and Rhythm: Normal rate and regular rhythm.      Heart sounds: Normal heart sounds.   Pulmonary:      Effort: Pulmonary effort is normal. No respiratory distress.      Breath sounds: Normal breath sounds. No wheezing or rales.   Abdominal:      Tenderness: There is no abdominal tenderness.   Musculoskeletal:         General: Normal range of motion.      Cervical back: Normal range of motion and neck  supple.      Comments: Right foot dressing noted   Skin:     General: Skin is dry.   Neurological:      Mental Status: He is alert and oriented to person, place, and time.       Significant Labs: Blood Culture:   Recent Labs   Lab 09/14/22  1726 09/14/22  1918   LABBLOO No Growth to date  No Growth to date  No Growth to date  No Growth to date  No Growth to date No Growth to date  No Growth to date  No Growth to date  No Growth to date  No Growth to date     BMP:   Recent Labs   Lab 09/19/22  0802   *      K 3.5   CL 99   CO2 26   BUN 9   CREATININE 1.5*   CALCIUM 7.9*     CBC:   Recent Labs   Lab 09/18/22  0559 09/19/22  0802   WBC 14.58* 15.57*   HGB 8.6* 9.7*   HCT 26.6* 30.9*    483*     Wound Culture:   Recent Labs   Lab 09/15/22  1247   LABAERO STAPHYLOCOCCUS AUREUS  Few  Susceptibility pending  *     All pertinent labs within the past 24 hours have been reviewed.    Significant Imaging: I have reviewed all pertinent imaging results/findings within the past 24 hours.

## 2022-09-19 NOTE — PLAN OF CARE
Patient tolerated intervention fair. Completed x20ft functional mobility with RW and min A (unable to comply with NWB). Recommending SNF at d/c.

## 2022-09-19 NOTE — ASSESSMENT & PLAN NOTE
Will plan to treat for 6 weeks with Daptomycin/Cefepime /flagyl -will use cultures to guide regime and make final antibiotic plan   Final plan will follow drug susceptibility of organism

## 2022-09-19 NOTE — PROGRESS NOTES
Pharmacokinetic Assessment Follow Up: IV Vancomycin    Vancomycin serum concentration assessment(s):    The random level was drawn correctly and can be used to guide therapy at this time. The measurement is within the desired definitive target range of 15 to 20 mcg/mL.    Vancomycin Regimen Plan:    Will continue pulse dose regimen with one time dose of vancomycin 1500 mg.     Re-dose when the random level is less than 20 mcg/mL, next level to be drawn at 2000 on 09/19    Drug levels (last 3 results):  Recent Labs   Lab Result Units 09/17/22  1817 09/18/22  1100 09/19/22  0439   Vancomycin, Random ug/mL 19.1 14.7 18.3       Pharmacy will continue to follow and monitor vancomycin.    Please contact pharmacy at extension [phone] for questions regarding this assessment.    Thank you for the consult,   Tiera Carvalho       Patient brief summary:  Henry Fernandez is a 52 y.o. male initiated on antimicrobial therapy with IV Vancomycin for treatment of bone/joint infection    The patient's current regimen is pulse dosing regimen.     Drug Allergies:   Review of patient's allergies indicates:  No Known Allergies    Actual Body Weight:   103 kg    Renal Function:   Estimated Creatinine Clearance: 86.1 mL/min (based on SCr of 1.3 mg/dL).,     Dialysis Method (if applicable):  N/A    CBC (last 72 hours):  Recent Labs   Lab Result Units 09/18/22  0559   WBC K/uL 14.58*   Hemoglobin g/dL 8.6*   Hematocrit % 26.6*   Platelets K/uL 425   Gran % % 73.5*   Lymph % % 13.4*   Mono % % 8.8   Eosinophil % % 1.2   Basophil % % 0.4   Differential Method  Automated       Metabolic Panel (last 72 hours):  Recent Labs   Lab Result Units 09/17/22  0446   Creatinine mg/dL 1.3       Vancomycin Administrations:  vancomycin given in the last 96 hours                     vancomycin 1.5 g in dextrose 5 % 250 mL IVPB (ready to mix) (mg) 1,500 mg New Bag 09/18/22 1657    vancomycin in dextrose 5 % 1 gram/250 mL IVPB 1,000 mg (mg) 1,000 mg New Bag 09/17/22  2315    vancomycin 1.25 g in dextrose 5% 250 mL IVPB (ready to mix) (mg) 1,250 mg New Bag 09/17/22 0621    vancomycin 1.5 g in dextrose 5 % 250 mL IVPB (ready to mix) (mg) 1,500 mg New Bag 09/16/22 1707    vancomycin 1.5 g in dextrose 5 % 250 mL IVPB (ready to mix) (mg) 1,500 mg New Bag 09/15/22 2037                    Microbiologic Results:  Microbiology Results (last 7 days)       Procedure Component Value Units Date/Time    Blood culture [[id number]] Collected: 09/14/22 1726    Order Status: Completed Specimen: Blood from Peripheral, Right Arm Updated: 09/19/22 0612     Blood Culture, Routine No Growth to date      No Growth to date      No Growth to date      No Growth to date      No Growth to date    Narrative:      Specimen # 1    Blood culture [[id number]] Collected: 09/14/22 1918    Order Status: Completed Specimen: Blood from Peripheral, Right Arm Updated: 09/19/22 0612     Blood Culture, Routine No Growth to date      No Growth to date      No Growth to date      No Growth to date      No Growth to date    Narrative:      Specimen # 2  Collection has been rescheduled by ADM1 at 09/14/2022 17:27 Reason:   Talk With Nurse Annie Will Come Back In 30 for Second Set  Collection has been rescheduled by ADM1 at 09/14/2022 17:27 Reason:   Talk With Nurse Annie Will Come Back In 30 for Second Set    Aerobic culture [[id number]]  (Abnormal) Collected: 09/15/22 1247    Order Status: Completed Specimen: Abscess from Foot, Right Updated: 09/18/22 0832     Aerobic Bacterial Culture STAPHYLOCOCCUS AUREUS  Few  Susceptibility pending      Tissue culture [[id number]]  (Abnormal) Collected: 09/15/22 1246    Order Status: Completed Specimen: Tissue from Foot, Right Updated: 09/18/22 0832     Aerobic Culture - Tissue STAPHYLOCOCCUS AUREUS  Moderate  Susceptibility pending       Gram Stain Result Few WBC's      Rare Gram positive cocci    Narrative:      Medial Cuneiform Right Foot Tissue Culture    Culture, Anaerobe  [[id number]] Collected: 09/15/22 1247    Order Status: Completed Specimen: Abscess from Foot, Right Updated: 09/17/22 1325     Anaerobic Culture Culture in progress    Culture, MRSA [[id number]] Collected: 09/14/22 1719    Order Status: Completed Specimen: MRSA source from Nares, Left Updated: 09/16/22 0738     MRSA Surveillance Screen No MRSA isolated    Aerobic culture [[id number]]     Order Status: No result Specimen: Abscess from Foot, Right     Aerobic culture [[id number]]     Order Status: Canceled Specimen: Abscess from Foot, Right

## 2022-09-19 NOTE — HPI
52 year old male with PMHx of HTN, Stroke, DM, HLD and gastroparesis and cholecystectomy who [place] [facility] [facility] for further treatment of Right diabetic foot wound.  Initial blood culture at outside facility - Staph schleiferi.   MRI of the RLE showed an 8.4cm dorsal fluid collection.  Wound cultures -09/15- staph aureus   He was seen by podiatry and had on 09/15-   Incision and drainage of right foot  2. Bone biopsy, right medial cuneiform\  Component      Latest Ref Rng & Units 9/19/2022 9/18/2022 9/16/2022   WBC      3.90 - 12.70 K/uL 15.57 (H) 14.58 (H) 30.83 (H)

## 2022-09-20 LAB
BACTERIA BLD CULT: NORMAL
BACTERIA BLD CULT: NORMAL
BACTERIA SPEC ANAEROBE CULT: NORMAL
POCT GLUCOSE: 168 MG/DL (ref 70–110)
POCT GLUCOSE: 174 MG/DL (ref 70–110)
POCT GLUCOSE: 179 MG/DL (ref 70–110)
POCT GLUCOSE: 181 MG/DL (ref 70–110)

## 2022-09-20 PROCEDURE — 21400001 HC TELEMETRY ROOM

## 2022-09-20 PROCEDURE — 25000003 PHARM REV CODE 250: Performed by: INTERNAL MEDICINE

## 2022-09-20 PROCEDURE — 25000003 PHARM REV CODE 250: Performed by: PODIATRIST

## 2022-09-20 PROCEDURE — 63600175 PHARM REV CODE 636 W HCPCS: Performed by: PODIATRIST

## 2022-09-20 PROCEDURE — 63600175 PHARM REV CODE 636 W HCPCS: Performed by: INTERNAL MEDICINE

## 2022-09-20 PROCEDURE — 97110 THERAPEUTIC EXERCISES: CPT

## 2022-09-20 RX ADMIN — ONDANSETRON 4 MG: 2 INJECTION INTRAMUSCULAR; INTRAVENOUS at 04:09

## 2022-09-20 RX ADMIN — METRONIDAZOLE 500 MG: 500 TABLET ORAL at 05:09

## 2022-09-20 RX ADMIN — INSULIN ASPART 2 UNITS: 100 INJECTION, SOLUTION INTRAVENOUS; SUBCUTANEOUS at 08:09

## 2022-09-20 RX ADMIN — AMLODIPINE BESYLATE 10 MG: 10 TABLET ORAL at 08:09

## 2022-09-20 RX ADMIN — METRONIDAZOLE 500 MG: 500 TABLET ORAL at 02:09

## 2022-09-20 RX ADMIN — CLOPIDOGREL 75 MG: 75 TABLET, FILM COATED ORAL at 08:09

## 2022-09-20 RX ADMIN — INSULIN ASPART 2 UNITS: 100 INJECTION, SOLUTION INTRAVENOUS; SUBCUTANEOUS at 12:09

## 2022-09-20 RX ADMIN — CEFAZOLIN SODIUM 2 G: 2 SOLUTION INTRAVENOUS at 12:09

## 2022-09-20 RX ADMIN — CEFAZOLIN SODIUM 2 G: 2 SOLUTION INTRAVENOUS at 03:09

## 2022-09-20 RX ADMIN — INSULIN ASPART 2 UNITS: 100 INJECTION, SOLUTION INTRAVENOUS; SUBCUTANEOUS at 04:09

## 2022-09-20 RX ADMIN — ATORVASTATIN CALCIUM 20 MG: 10 TABLET, FILM COATED ORAL at 08:09

## 2022-09-20 RX ADMIN — ONDANSETRON 4 MG: 2 INJECTION INTRAMUSCULAR; INTRAVENOUS at 10:09

## 2022-09-20 RX ADMIN — CEFAZOLIN SODIUM 2 G: 2 SOLUTION INTRAVENOUS at 08:09

## 2022-09-20 NOTE — PT/OT/SLP PROGRESS
Occupational Therapy   Treatment    Name: Henry Fernandez  MRN: [id number]  Admitting Diagnosis:  Diabetic ulcer of right foot  5 Days Post-Op    Recommendations:     Discharge Recommendations: nursing facility, skilled  Discharge Equipment Recommendations:  other (see comments) (TBD by next level of care)  Barriers to discharge:  None    Assessment:     Henry Fernandez is a 52 y.o. male with a medical diagnosis of Diabetic ulcer of right foot.  He presents with the following performance deficits affecting function are weakness, impaired endurance, impaired self care skills, impaired functional mobility, gait instability, impaired balance, decreased upper extremity function, decreased lower extremity function, decreased safety awareness, decreased ROM, impaired skin, edema, orthopedic precautions.     Rehab Prognosis:  Fair; patient would benefit from acute skilled OT services to address these deficits and reach maximum level of function.       Plan:     Patient to be seen 2 x/week to address the above listed problems via self-care/home management, therapeutic exercises, therapeutic activities  Plan of Care Expires: 09/30/22  Plan of Care Reviewed with: patient    Subjective     Pain/Comfort:  Pain Rating 1: 0/10    Objective:     Communicated with: nurse [facility] and [facility] chart review prior to session.  Patient found HOB elevated with peripheral IV, telemetry upon OT entry to room.    General Precautions: Standard, fall   Orthopedic Precautions:RLE non weight bearing   Braces: AFO (LLE)  Respiratory Status: Room air     Occupational Performance:     Bed Mobility:    Patient completed Scooting/Bridging with stand by assistance  Patient completed Supine to Sit with stand by assistance     Functional Mobility/Transfers:  Not completed at this time due to pt refusal.    [facility] 6 Click ADL: 16    Treatment & Education:  Pt educated on and completed BUE therex:  x20 reps AROM elbow flexion  X20 reps AROM digit flexion/ext while  "using rolled washcloth with LUE  X10 reps self-ROM shoulder flexion, using RUE to assist LUE  X20 reps self-ROM chest press, using RUE to assist LUE    Pt refusing OOB activity today despite max encouragement, stating "I'm too tired and weak to do anything today. I'll do some exercises with my arms." Pt reports completing grooming tasks earlier this morning. Pt able to recall RLE NWB status, reviewed importance of compliance. Educated on weightbearing through LUE for proprioceptive feedback and input. Reviewed role of OT in acute setting and benefits of participation. Educated on importance of OOB activity and calling for A to transfer back to bed. Encouraged completion of B UE AROM/self-ROM therex throughout the day to tolerance to increase functional strength and activity tolerance. Patient stated understanding and in agreement with POC.       Patient left sitting edge of bed with all lines intact, call button in reach, and nurse notified    GOALS:   Multidisciplinary Problems       Occupational Therapy Goals          Problem: Occupational Therapy    Goal Priority Disciplines Outcome Interventions   Occupational Therapy Goal     OT, PT/OT Ongoing, Progressing    Description: Goals to be met by: 9/30/22     Patient will increase functional independence with ADLs by performing:    LE Dressing with Set-up Assistance to primo shoe and AFO.  Stand pivot transfers with Modified Blanco.  Upper extremity exercise program x20 reps per handout, with assistance as needed.                         Time Tracking:     OT Date of Treatment: 09/20/22  OT Start Time: 1000  OT Stop Time: 1015  OT Total Time (min): 15 min    Billable Minutes:Therapeutic Exercise 15    OT/SHALONDA: ANICETO Chandler OT     9/20/2022  "

## 2022-09-20 NOTE — PLAN OF CARE
Pt Aoox4, afebrile. No c/o pain. Nausea treated with prn zofran. Wound vac placed today. IV abx admin per PICC line without incident. POC reviewed with patient. Bed locked, call light in reach.

## 2022-09-20 NOTE — PROGRESS NOTES
[facility] - Telemetry ([facility])  [facility]  Progress Note    Patient Name: Henry Fernandez  MRN: [id number]  Patient Class: IP- Inpatient   Admission Date: 9/14/2022  Length of Stay: 6 days  Attending Physician: Evens Mccauley, *  Primary Care Provider: No Maher MD        Subjective:     Principal Problem:Diabetic ulcer of right foot        HPI:  Mr Fernandez is a 52 year old male with PMHx of HTN, Stroke, DM, HLD and gastroparesis and cholecystectomy who presents to [facility] from [facility] for further treatment of Right diabetic foot wound. He ws transferred for Podiatry and Infectious Disease services. Denies associated symptoms of chest pain, shortness of breath, fever or chills. According to [facility]  MD, Blood cultures at previous hospital positive for Staph schleiferi. He was being treated with Rocephin and Vancomycin.  MRI of the RLE showed an 8.4cm dorsal fluid collection, with a possibilty of osteo. Patient is a full code, sighficant other and mother are surrogate Decision maker. Patient is being admitted under the care of [facility],.       Overview/Hospital Course:  Patient is a 52 year old male who presented to [facility] from [facility] for Podiatry and Infectious Disease Services. Podiatry following, patient under I & D at bedside last night. He was taken for surgery today and underwent I & D, Debridement and bone biopsy. Post procedure, patient doing well, vital signs stable. Will continue IV antibiotics, possible wound vac in 1-2 days.     9/16 He denies any complaint for today . He is s/p I&D of Right foot Diabetic wound and abscess  per Podiatrist . The WBC is trending up and IVAB change fron rocephin to Cefepiem and metronidazol ID consulted .     9/17 he is complaining of nause /vomiting . The wound cx are (+) for staph . The kidney fucntion is improving . Cont IVAB  and wound care     9/18 Wond cx (+) for staph . NAEON . CM  consulted for d/c planning . Will need 6 week IVAB .     9/19  ID consulted . MARYEON . Plan  for a  PICC line today  . The Blood  Cx are NGTD . No new complaint .     9/20 Wound vac in place . PT/OT rec SNF . Wound cx (+) for MSSA.  NAEON .       Interval History:     Review of Systems   Constitutional:  Positive for activity change. Negative for chills, diaphoresis, fatigue and fever.   HENT:  Negative for congestion, ear discharge, rhinorrhea, sinus pressure, sore throat and trouble swallowing.    Eyes:  Negative for discharge and visual disturbance.   Respiratory:  Negative for apnea, cough, choking, chest tightness, shortness of breath, wheezing and stridor.    Cardiovascular:  Positive for leg swelling (right leg swelling). Negative for chest pain and palpitations.   Gastrointestinal:  Negative for abdominal distention, abdominal pain, diarrhea, nausea and vomiting.   Endocrine: Negative for cold intolerance and heat intolerance.   Genitourinary:  Negative for dysuria, frequency and hematuria.   Musculoskeletal:  Negative for arthralgias, back pain, myalgias and neck pain.        Left side weakness    Skin:  Positive for wound (right foot diabetic wound). Negative for pallor and rash.   Neurological:  Positive for weakness. Negative for dizziness, seizures, syncope and headaches.   Psychiatric/Behavioral:  Negative for agitation, confusion and sleep disturbance.    Objective:     Vital Signs (Most Recent):  Temp: 98.4 °F (36.9 °C) (09/20/22 1129)  Pulse: 80 (09/20/22 1129)  Resp: 18 (09/20/22 1129)  BP: (!) 172/91 (09/20/22 1129)  SpO2: 98 % (09/20/22 1129)   Vital Signs (24h Range):  Temp:  [98.2 °F (36.8 °C)-99.2 °F (37.3 °C)] 98.4 °F (36.9 °C)  Pulse:  [80-95] 80  Resp:  [16-20] 18  SpO2:  [96 %-98 %] 98 %  BP: (140-172)/(78-91) 172/91     Weight: 103 kg (227 lb 1.2 oz)  Body mass index is 29.15 kg/m².    Intake/Output Summary (Last 24 hours) at 9/20/2022 1450  Last data filed at 9/20/2022 0600  Gross per 24  hour   Intake 570.27 ml   Output 1000 ml   Net -429.73 ml      Physical Exam  Vitals and nursing note reviewed.   Eyes:      General:         Right eye: No discharge.         Left eye: No discharge.   Cardiovascular:      Heart sounds: No murmur heard.    No friction rub. No gallop.   Pulmonary:      Effort: No respiratory distress.      Breath sounds: No stridor. No wheezing or rhonchi.   Chest:      Chest wall: No tenderness.   Abdominal:      General: There is no distension.      Palpations: There is no mass.      Tenderness: There is no abdominal tenderness. There is no right CVA tenderness, left CVA tenderness, guarding or rebound.      Hernia: No hernia is present.   Musculoskeletal:         General: No swelling, tenderness, deformity or signs of injury.      Right lower le+ Edema present.      Left lower leg: No edema.      Comments: Rt foot with kerlix dressing    Skin:     Coloration: Skin is not jaundiced or pale.      Findings: No bruising, erythema, lesion or rash.       Significant Labs: All pertinent labs within the past 24 hours have been reviewed.      Significant Imaging: I have reviewed all pertinent imaging results/findings within the past 24 hours.        Assessment/Plan:      * Diabetic ulcer of right foot with abscess and medial cuneiform osteomyelitis   S/P I&D per  Podiatrist   S/P Bone cx   Wound cx (+) for staph  MSSA  Cont IVAB  Will need 6 week of IVAB        CKD (chronic kidney disease) stage 3, GFR 30-59 ml/min    Review of some previous labs, looks like renal function around baseline   Will start gentle hydration and assess response     stable     Charcot's joint of foot, right  Podiatry following       Abscess of foot without toes, right  9/15   S/P- I & D, Debridement and bone biopsy today   Monitor wound cultures   Possible wound vac in 1-2 days   Continue IV antibiotic  ID consulted          Bacteremia  Blood culture at previous hospital positive for Staph Schleiferi   Repeat  Blood culture   Consult ID   Continue Vanco and Rocephin       9/15/2022  Repeat BC, no growth to date   Continue IV Vanco and Rocephin   ID consulted       Repeated Blood cx NGTD     History of stroke  History of Stroke with  Left sided weakness   Continue Statin   Lipid panel   Hold Plavix for possible surgery       Resume plavix    Hypertension    Continue home meds   Hydralazine IV PRN     Diabetes mellitus  Patient's FSGs are uncontrolled due to hyperglycemia on current medication regimen.  Last A1c reviewed-   Lab Results   Component Value Date    HGBA1C 9.5 (H) 09/14/2022     Most recent fingerstick glucose reviewed-   Recent Labs   Lab 09/19/22  1557 09/19/22 2022 09/20/22  0523 09/20/22  1104   POCTGLUCOSE 197* 183* 174* 168*     Current correctional scale  Medium  Maintain anti-hyperglycemic dose as follows-   Antihyperglycemics (From admission, onward)    Start     Stop Route Frequency Ordered    09/17/22 2100  insulin detemir U-100 pen 30 Units         -- SubQ Nightly 09/17/22 1339    09/14/22 1758  insulin aspart U-100 pen 1-10 Units         -- SubQ Before meals & nightly PRN 09/14/22 1658        Hold Oral hypoglycemics while patient is in the hospital.    Diabetic gastroparesis  Patient's FSGs are uncontrolled due to hyperglycemia on current medication regimen.  Last A1c reviewed-   Lab Results   Component Value Date    HGBA1C 9.5 (H) 09/14/2022     Most recent fingerstick glucose reviewed-   Recent Labs   Lab 09/19/22  1557 09/19/22 2022 09/20/22  0523 09/20/22  1104   POCTGLUCOSE 197* 183* 174* 168*     Current correctional scale  Medium  Maintain anti-hyperglycemic dose as follows-   Antihyperglycemics (From admission, onward)    Start     Stop Route Frequency Ordered    09/17/22 2100  insulin detemir U-100 pen 30 Units         -- SubQ Nightly 09/17/22 1339    09/14/22 1758  insulin aspart U-100 pen 1-10 Units         -- SubQ Before meals & nightly PRN 09/14/22 1658        Hold Oral hypoglycemics  while patient is in the hospital.    Antemetic as needed       VTE Risk Mitigation (From admission, onward)         Ordered     IP VTE HIGH RISK PATIENT  Once         09/14/22 1652     Place sequential compression device  Until discontinued         09/14/22 1652                Discharge Planning   ZECHARIAH:      Code Status: Full Code   Is the patient medically ready for discharge?:     Reason for patient still in hospital (select all that apply): Treatment  Discharge Plan A: Home Health                  Evenserica Mccauley MD  Department of Hospital Medicine   [employer] - Telemetry ([facility])

## 2022-09-20 NOTE — PLAN OF CARE
Received consult for SNF.  Preference obtained for [facility] in [place], [place].  Referral sent via [employer].       09/20/22 6711   Post-Acute Status   Post-Acute Authorization Placement   Post-Acute Placement Status Referrals Sent   Discharge Plan   Discharge Plan A Skilled Nursing Facility

## 2022-09-20 NOTE — ASSESSMENT & PLAN NOTE
S/P I&D per  Podiatrist   S/P Bone cx   Wound cx (+) for staph  MSSA  Cont IVAB  Will need 6 week of IVAB

## 2022-09-20 NOTE — SUBJECTIVE & OBJECTIVE
Interval History:     Review of Systems   Constitutional:  Positive for activity change. Negative for chills, diaphoresis, fatigue and fever.   HENT:  Negative for congestion, ear discharge, rhinorrhea, sinus pressure, sore throat and trouble swallowing.    Eyes:  Negative for discharge and visual disturbance.   Respiratory:  Negative for apnea, cough, choking, chest tightness, shortness of breath, wheezing and stridor.    Cardiovascular:  Positive for leg swelling (right leg swelling). Negative for chest pain and palpitations.   Gastrointestinal:  Negative for abdominal distention, abdominal pain, diarrhea, nausea and vomiting.   Endocrine: Negative for cold intolerance and heat intolerance.   Genitourinary:  Negative for dysuria, frequency and hematuria.   Musculoskeletal:  Negative for arthralgias, back pain, myalgias and neck pain.        Left side weakness    Skin:  Positive for wound (right foot diabetic wound). Negative for pallor and rash.   Neurological:  Positive for weakness. Negative for dizziness, seizures, syncope and headaches.   Psychiatric/Behavioral:  Negative for agitation, confusion and sleep disturbance.    Objective:     Vital Signs (Most Recent):  Temp: 98.4 °F (36.9 °C) (09/20/22 1129)  Pulse: 80 (09/20/22 1129)  Resp: 18 (09/20/22 1129)  BP: (!) 172/91 (09/20/22 1129)  SpO2: 98 % (09/20/22 1129)   Vital Signs (24h Range):  Temp:  [98.2 °F (36.8 °C)-99.2 °F (37.3 °C)] 98.4 °F (36.9 °C)  Pulse:  [80-95] 80  Resp:  [16-20] 18  SpO2:  [96 %-98 %] 98 %  BP: (140-172)/(78-91) 172/91     Weight: 103 kg (227 lb 1.2 oz)  Body mass index is 29.15 kg/m².    Intake/Output Summary (Last 24 hours) at 9/20/2022 1450  Last data filed at 9/20/2022 0600  Gross per 24 hour   Intake 570.27 ml   Output 1000 ml   Net -429.73 ml      Physical Exam  Vitals and nursing note reviewed.   Eyes:      General:         Right eye: No discharge.         Left eye: No discharge.   Cardiovascular:      Heart sounds: No murmur  heard.    No friction rub. No gallop.   Pulmonary:      Effort: No respiratory distress.      Breath sounds: No stridor. No wheezing or rhonchi.   Chest:      Chest wall: No tenderness.   Abdominal:      General: There is no distension.      Palpations: There is no mass.      Tenderness: There is no abdominal tenderness. There is no right CVA tenderness, left CVA tenderness, guarding or rebound.      Hernia: No hernia is present.   Musculoskeletal:         General: No swelling, tenderness, deformity or signs of injury.      Right lower le+ Edema present.      Left lower leg: No edema.      Comments: Rt foot with kerlix dressing    Skin:     Coloration: Skin is not jaundiced or pale.      Findings: No bruising, erythema, lesion or rash.       Significant Labs: All pertinent labs within the past 24 hours have been reviewed.      Significant Imaging: I have reviewed all pertinent imaging results/findings within the past 24 hours.

## 2022-09-20 NOTE — ASSESSMENT & PLAN NOTE
Patient's FSGs are uncontrolled due to hyperglycemia on current medication regimen.  Last A1c reviewed-   Lab Results   Component Value Date    HGBA1C 9.5 (H) 09/14/2022     Most recent fingerstick glucose reviewed-   Recent Labs   Lab 09/19/22  1557 09/19/22 2022 09/20/22  0523 09/20/22  1104   POCTGLUCOSE 197* 183* 174* 168*     Current correctional scale  Medium  Maintain anti-hyperglycemic dose as follows-   Antihyperglycemics (From admission, onward)    Start     Stop Route Frequency Ordered    09/17/22 2100  insulin detemir U-100 pen 30 Units         -- SubQ Nightly 09/17/22 1339    09/14/22 1758  insulin aspart U-100 pen 1-10 Units         -- SubQ Before meals & nightly PRN 09/14/22 1658        Hold Oral hypoglycemics while patient is in the hospital.

## 2022-09-20 NOTE — PLAN OF CARE
"Continue OT POC.  Pt refusing OOB activity today despite max encouragement, "I'm too tired and weak to do anything today." Agreeable to sitting EOB and completing BUE therex.  "

## 2022-09-20 NOTE — ASSESSMENT & PLAN NOTE
Patient's FSGs are uncontrolled due to hyperglycemia on current medication regimen.  Last A1c reviewed-   Lab Results   Component Value Date    HGBA1C 9.5 (H) 09/14/2022     Most recent fingerstick glucose reviewed-   Recent Labs   Lab 09/19/22  1557 09/19/22 2022 09/20/22  0523 09/20/22  1104   POCTGLUCOSE 197* 183* 174* 168*     Current correctional scale  Medium  Maintain anti-hyperglycemic dose as follows-   Antihyperglycemics (From admission, onward)    Start     Stop Route Frequency Ordered    09/17/22 2100  insulin detemir U-100 pen 30 Units         -- SubQ Nightly 09/17/22 1339    09/14/22 1758  insulin aspart U-100 pen 1-10 Units         -- SubQ Before meals & nightly PRN 09/14/22 1658        Hold Oral hypoglycemics while patient is in the hospital.    Antemetic as needed

## 2022-09-21 LAB
FINAL PATHOLOGIC DIAGNOSIS: NORMAL
GROSS: NORMAL
Lab: NORMAL
POCT GLUCOSE: 120 MG/DL (ref 70–110)
POCT GLUCOSE: 219 MG/DL (ref 70–110)
POCT GLUCOSE: 234 MG/DL (ref 70–110)

## 2022-09-21 PROCEDURE — 21400001 HC TELEMETRY ROOM

## 2022-09-21 PROCEDURE — 25000003 PHARM REV CODE 250: Performed by: INTERNAL MEDICINE

## 2022-09-21 PROCEDURE — 97530 THERAPEUTIC ACTIVITIES: CPT

## 2022-09-21 PROCEDURE — 63600175 PHARM REV CODE 636 W HCPCS: Performed by: PODIATRIST

## 2022-09-21 PROCEDURE — 63600175 PHARM REV CODE 636 W HCPCS: Performed by: INTERNAL MEDICINE

## 2022-09-21 PROCEDURE — 97110 THERAPEUTIC EXERCISES: CPT

## 2022-09-21 PROCEDURE — 99223 PR INITIAL HOSPITAL CARE,LEVL III: ICD-10-PCS | Mod: NSCH,,, | Performed by: INTERNAL MEDICINE

## 2022-09-21 PROCEDURE — 25000003 PHARM REV CODE 250: Performed by: PODIATRIST

## 2022-09-21 PROCEDURE — 99223 1ST HOSP IP/OBS HIGH 75: CPT | Mod: NSCH,,, | Performed by: INTERNAL MEDICINE

## 2022-09-21 RX ADMIN — CEFAZOLIN SODIUM 2 G: 2 SOLUTION INTRAVENOUS at 08:09

## 2022-09-21 RX ADMIN — METRONIDAZOLE 500 MG: 500 TABLET ORAL at 01:09

## 2022-09-21 RX ADMIN — INSULIN ASPART 4 UNITS: 100 INJECTION, SOLUTION INTRAVENOUS; SUBCUTANEOUS at 11:09

## 2022-09-21 RX ADMIN — AMLODIPINE BESYLATE 10 MG: 10 TABLET ORAL at 08:09

## 2022-09-21 RX ADMIN — CLOPIDOGREL 75 MG: 75 TABLET, FILM COATED ORAL at 08:09

## 2022-09-21 RX ADMIN — ONDANSETRON 4 MG: 2 INJECTION INTRAMUSCULAR; INTRAVENOUS at 09:09

## 2022-09-21 RX ADMIN — METRONIDAZOLE 500 MG: 500 TABLET ORAL at 09:09

## 2022-09-21 RX ADMIN — CEFAZOLIN SODIUM 2 G: 2 SOLUTION INTRAVENOUS at 11:09

## 2022-09-21 RX ADMIN — CEFAZOLIN SODIUM 2 G: 2 SOLUTION INTRAVENOUS at 04:09

## 2022-09-21 RX ADMIN — METRONIDAZOLE 500 MG: 500 TABLET ORAL at 05:09

## 2022-09-21 RX ADMIN — Medication 6 MG: at 09:09

## 2022-09-21 RX ADMIN — ATORVASTATIN CALCIUM 20 MG: 10 TABLET, FILM COATED ORAL at 08:09

## 2022-09-21 RX ADMIN — INSULIN ASPART 2 UNITS: 100 INJECTION, SOLUTION INTRAVENOUS; SUBCUTANEOUS at 08:09

## 2022-09-21 NOTE — PT/OT/SLP PROGRESS
Occupational Therapy   Treatment    Name: Henry Fernandez  MRN: [id number]  Admitting Diagnosis:  Diabetic ulcer of right foot  6 Days Post-Op    Recommendations:     Discharge Recommendations: LTACH (long-term acute care hospital)  Discharge Equipment Recommendations:   (TBD at next level of care)  Barriers to discharge:  None    Assessment:     Henry Fernandez is a 52 y.o. male with a medical diagnosis of Diabetic ulcer of right foot.  He presents with the following performance deficits affecting function are weakness, impaired endurance, impaired self care skills, impaired functional mobility, impaired balance, impaired cardiopulmonary response to activity, decreased safety awareness, orthopedic precautions.     Rehab Prognosis:  Good; patient would benefit from acute skilled OT services to address these deficits and reach maximum level of function.       Plan:     Patient to be seen 2 x/week to address the above listed problems via self-care/home management, therapeutic activities, therapeutic exercises  Plan of Care Expires: 09/30/22  Plan of Care Reviewed with: patient    Subjective     Pain/Comfort:  Pain Rating 1: 0/10    Objective:     Communicated with: NurseJyotsna, prior to session.  Patient found sitting edge of bed with wound vac, peripheral IV, telemetry upon OT entry to room.    General Precautions: Standard, fall   Orthopedic Precautions:RLE non weight bearing (now with wound vac. per MD NWB to R LE)   Braces: AFO (L LE)  Respiratory Status: Room air    Functional Mobility/Transfers:  Patient completed Sit <> Stand Transfer with minimum assistance  with  hand-held assist   Patient completed Bed <> Chair Transfer using Squat Pivot technique with minimum assistance with hand-held assist    AMPA 6 Click ADL: 16    Treatment & Education:  Patient provided with extensive education re: NWB precaution and it's functional implications. Due to L hemiparesis ability to comply with R LE NWB is extremely difficult.  Unable to maintain with sit>stand using RW or HHA (with cueing). Attempted education re: completion of squat>pivot maintaining precaution, but transfer to affected L side continues to impair compliance in addition to transferring to chair without removable arm rest. Completed transfer that allowed for limited duration on extremity but promoted OOB activity to decrease risk of continued debility. Patient completed B UE AROM to tolerance (and as able with L UE) x10 reps, x3 planes of motion. Encouraged continued completion via HEP. Intermittent discomfort in L shdr that improved with rest break. Patient in agreement to call for A to transfer back to bed.    Patient left up in chair with all lines intact, call button in reach, and chair alarm on    GOALS:   Multidisciplinary Problems       Occupational Therapy Goals          Problem: Occupational Therapy    Goal Priority Disciplines Outcome Interventions   Occupational Therapy Goal     OT, PT/OT Ongoing, Progressing    Description: Goals to be met by: 9/30/22     Patient will increase functional independence with ADLs by performing:    LE Dressing with Set-up Assistance to primo shoe and AFO.  Stand pivot transfers with Modified Stephens.  Upper extremity exercise program x20 reps per handout, with assistance as needed.                         Time Tracking:     OT Date of Treatment: 09/21/22  OT Start Time: 0840  OT Stop Time: 0905  OT Total Time (min): 25 min    Billable Minutes:Therapeutic Activity 15  Therapeutic Exercise 10    9/21/2022

## 2022-09-21 NOTE — ASSESSMENT & PLAN NOTE
Patient's FSGs are uncontrolled due to hyperglycemia on current medication regimen.  Last A1c reviewed-   Lab Results   Component Value Date    HGBA1C 9.5 (H) 09/14/2022     Most recent fingerstick glucose reviewed-   Recent Labs   Lab 09/20/22  1609 09/20/22  2100 09/21/22  0552 09/21/22  1137   POCTGLUCOSE 179* 181* 120* 234*     Current correctional scale  Medium  Maintain anti-hyperglycemic dose as follows-   Antihyperglycemics (From admission, onward)    Start     Stop Route Frequency Ordered    09/17/22 2100  insulin detemir U-100 pen 30 Units         -- SubQ Nightly 09/17/22 1339    09/14/22 1758  insulin aspart U-100 pen 1-10 Units         -- SubQ Before meals & nightly PRN 09/14/22 1658        Hold Oral hypoglycemics while patient is in the hospital.

## 2022-09-21 NOTE — ASSESSMENT & PLAN NOTE
S/P I&D per  Podiatrist   S/P Bone cx   Wound cx (+) for staph  MSSA  Cont IVAB  Will need 6 week of IVAB    Wound vac in place  PT/OT rec SNF

## 2022-09-21 NOTE — PLAN OF CARE
Problem: Adult Inpatient Plan of Care  Goal: Plan of Care Review  Outcome: Ongoing, Progressing     Problem: Infection  Goal: Absence of Infection Signs and Symptoms  Outcome: Ongoing, Progressing     Problem: Impaired Wound Healing  Goal: Optimal Wound Healing  Outcome: Ongoing, Progressing     Problem: Fall Injury Risk  Goal: Absence of Fall and Fall-Related Injury  Outcome: Ongoing, Progressing       Patient remains free from injury. Safety precautions maintained. No s/s of acute distress. Pain and nausea controlled per MD order. Glucose monitoring continued. Wound vac care completed. Pt education about care completed.

## 2022-09-21 NOTE — ASSESSMENT & PLAN NOTE
Patient's FSGs are uncontrolled due to hyperglycemia on current medication regimen.  Last A1c reviewed-   Lab Results   Component Value Date    HGBA1C 9.5 (H) 09/14/2022     Most recent fingerstick glucose reviewed-   Recent Labs   Lab 09/20/22  1609 09/20/22  2100 09/21/22  0552 09/21/22  1137   POCTGLUCOSE 179* 181* 120* 234*     Current correctional scale  Medium  Maintain anti-hyperglycemic dose as follows-   Antihyperglycemics (From admission, onward)    Start     Stop Route Frequency Ordered    09/17/22 2100  insulin detemir U-100 pen 30 Units         -- SubQ Nightly 09/17/22 1339    09/14/22 1758  insulin aspart U-100 pen 1-10 Units         -- SubQ Before meals & nightly PRN 09/14/22 1658        Hold Oral hypoglycemics while patient is in the hospital.    Antemetic as needed

## 2022-09-21 NOTE — PLAN OF CARE
Pt oriented x4.  VSS.  Pt remained afebrile throughout this shift.   All meds administered per order.   Pt remained free of falls this shift.   Plan of care reviewed. Patient verbalizes understanding.   Pt moving/turning independently.   Blood glucose monitored, coverage per SSI.  Wound vac to right foot.  Bed low, side rails up x 2, wheels locked, call light in reach.   Patient instructed to call for assistance.  Will continue to monitor.

## 2022-09-21 NOTE — PT/OT/SLP PROGRESS
Physical Therapy Treatment    Patient Name:  Henry Fernandez   MRN:  [id number]    Recommendations:     Discharge Recommendations:  LTACH (long-term acute care hospital), nursing facility, skilled   Discharge Equipment Recommendations:  (TBD AT NEXT LEVEL OF CARE)   Barriers to discharge:  ACUTE LIMITATIONS    Assessment:     Henry Fernandez is a 52 y.o. male admitted with a medical diagnosis of Diabetic ulcer of right foot.  He presents with the following impairments/functional limitations:  weakness, impaired endurance, impaired functional mobility, gait instability, impaired balance, decreased safety awareness, decreased lower extremity function, decreased upper extremity function, decreased coordination, abnormal tone.    Rehab Prognosis: Fair; patient would benefit from acute skilled PT services to address these deficits and reach maximum level of function.    Recent Surgery: Procedure(s) (LRB):  INCISION AND DRAINAGE, FOOT (Right)  DEBRIDEMENT, FOOT (Right)  BIOPSY, BONE (Right) 6 Days Post-Op    Plan:     During this hospitalization, patient to be seen 3 x/week to address the identified rehab impairments via gait training, therapeutic activities, therapeutic exercises and progress toward the following goals:    Plan of Care Expires:  09/30/22    Subjective     Chief Complaint:   Patient/Family Comments/goals:   Pain/Comfort:  Pain Rating 1: 0/10      Objective:     Communicated with NURSE ALMARAZ prior to session.  Patient found supine with telemetry, peripheral IV, wound vac upon PT entry to room.     General Precautions: Standard, fall   Orthopedic Precautions:RLE non weight bearing   Braces: AFO (LLE)  Respiratory Status: Room air     Functional Mobility:  Bed Mobility:     Rolling Left:  stand by assistance  Scooting: stand by assistance  Supine to Sit: stand by assistance  Transfers:     Sit to Stand:  minimum assistance with no AD- 3 ATTEMPTS BUT UNABLE TO COMPLY  WITH NWB TO RLE, LIMITED BY L PRASANTH PARESIS  Bed to  "Chair: minimum assistance with  no AD  using  Squat Pivot- IN ATTEMPT TO COMPLY WITH NWB TO RLE  Balance: POOR+    AM-PAC 6 CLICK MOBILITY  Turning over in bed (including adjusting bedclothes, sheets and blankets)?: 4  Sitting down on and standing up from a chair with arms (e.g., wheelchair, bedside commode, etc.): 3  Moving from lying on back to sitting on the side of the bed?: 4  Moving to and from a bed to a chair (including a wheelchair)?: 3  Need to walk in hospital room?: 1  Climbing 3-5 steps with a railing?: 1  Basic Mobility Total Score: 16     Therapeutic Activities and Exercises:  REVIEW ROLE OF P.T. AND POC IN ACUTE CARE HOSPITAL SETTING, REVIEW NWB FOR RLE.  PT REQUIRED MAX/MODA FOR DONNING AFO/SHOE FOR LLE WITH EXTRA TIME.  PT ENCOURAGED TO INCREASE TIME OOB IN CHAIR TO TOLERANCE, EDUCATED IN AND ENCOURAGED TO PERFORM BLE THEREX WHILE SEATED OR SUPINE THROUGHOUT THE DAY TO TOLERANCE: HIP FLEX/EXT, QUAD SET, LAQ, HEEL SLIDES, AP'S.  PT AGREEABLE TO REQUESTS.  PT PERFORMED 15 REPS FOR RLE AROM, UNABLE TO TOLERATE FOR LLE  PT EDUCATED ON RISK FOR FALLS DUE TO GENERALIZED WEAKNESS, EDUCATED ON "CALL DON'T FALL", ENCOURAGED TO CALL FOR ASSISTANCE WITH ALL NEEDS SUCH AS BED<>CHAIR TRANSFERS OR TRIPS TO BATHROOM, PT AGREEABLE TO SAFETY PRECAUTIONS    Patient left up in chair with all lines intact, call button in reach, chair alarm on, and NURSE notified..    GOALS:   Multidisciplinary Problems       Physical Therapy Goals          Problem: Physical Therapy    Goal Priority Disciplines Outcome Goal Variances Interventions   Physical Therapy Goal     PT, PT/OT Ongoing, Progressing     Description: Pt will perform bed mobility independently in order to participate in EOB activity.  Pt will perform transfers independently in order to participate in OOB activity.   Pt will ambulate 50ft CGA with LRAD while maintaining WB status (if any) in order to participate in daily tasks.    Pt will perform w/c mobility 50ft x " 2 in order to safety navigate surroundings.                        Time Tracking:     PT Received On: 09/21/22  PT Start Time: 0825     PT Stop Time: 0848  PT Total Time (min): 23 min     Billable Minutes: Therapeutic Activity 23    Treatment Type: Treatment  PT/PTA: PT     PTA Visit Number: 0     09/21/2022

## 2022-09-21 NOTE — PLAN OF CARE
[facility]  Called x 3 left message on answering machine.  Called again. Phone answered but hung up when transfer to admissions attempted.  Called back received answering machine.  Spoke with patient advised of above.  Received permission to send referral to [facility].  Referral sent in [facility].  Spoke to augustina Machuca at [facility].  They are reviewing now.       09/21/22 1775   Post-Acute Status   Post-Acute Authorization Placement   Post-Acute Placement Status Referrals Sent   Discharge Plan   Discharge Plan A Skilled Nursing Facility

## 2022-09-21 NOTE — CONSULTS
[facility] - Telemetry ([facility])  Wound Care    Patient Name:  Henry Fernandez   MRN:  [id number]  Date: 9/21/2022  Diagnosis: Diabetic ulcer of right foot    History:     Past Medical History:   Diagnosis Date    Diabetes mellitus     Gastroparesis     Hypertension     Stroke        Social History     Socioeconomic History    Marital status: Significant Other   Tobacco Use    Smoking status: Never    Smokeless tobacco: Never   Substance and Sexual Activity    Alcohol use: No    Drug use: No    Sexual activity: Yes     Partners: Female       Precautions:     Allergies as of 09/12/2022    (No Known Allergies)       WOC Assessment Details/Treatment               Consulted for wound vac change.  54 yo female with PPMHx of HTN, Stroke, DM, HLD and gastroparesis and cholecystectomy who presents to [facility] from [facility] for further treatment of right diabetic foot wound. Underwent I&D  9/15 of right foot. Dressing removed. Medial dorsal foot wound measures [id number] tracking all the way to a smaller dorsal foot wound measuring 0.5x0.5cm. plantar foot wound measuring 3x3x0.2cm with a 1 cm tunnel. moist red smooth wound bed with some scant slough. Cleansed with saline and gauze and patted dry. Periwound painted with cavilon. Wounds each filled with 1 piece of black foam and secured with drape. (3 pieces total). Extra drape and foam used to create bridge to connect all wounds and for better trac pad placement.  RLE wrapped with kerlix and ACE. Will plan for vac change Friday. Patient tolerated well.        09/21/2022

## 2022-09-21 NOTE — PLAN OF CARE
Patient tolerated intervention fair. Due to L hemiparesis compliance with NWB to R LE extremely difficult. Recommending LTACH at d/c.

## 2022-09-21 NOTE — SUBJECTIVE & OBJECTIVE
Interval History:     Review of Systems   Constitutional:  Positive for activity change. Negative for chills, diaphoresis, fatigue and fever.   HENT:  Negative for congestion, ear discharge, rhinorrhea, sinus pressure, sore throat and trouble swallowing.    Eyes:  Negative for discharge and visual disturbance.   Respiratory:  Negative for apnea, cough, choking, chest tightness, shortness of breath, wheezing and stridor.    Cardiovascular:  Positive for leg swelling (right leg swelling). Negative for chest pain and palpitations.   Gastrointestinal:  Negative for abdominal distention, abdominal pain, diarrhea, nausea and vomiting.   Endocrine: Negative for cold intolerance and heat intolerance.   Genitourinary:  Negative for dysuria, frequency and hematuria.   Musculoskeletal:  Negative for arthralgias, back pain, myalgias and neck pain.        Left side weakness    Skin:  Positive for wound (right foot diabetic wound). Negative for pallor and rash.   Neurological:  Positive for weakness. Negative for dizziness, seizures, syncope and headaches.   Psychiatric/Behavioral:  Negative for agitation, confusion and sleep disturbance.    Objective:     Vital Signs (Most Recent):  Temp: 98.1 °F (36.7 °C) (09/21/22 1140)  Pulse: 81 (09/21/22 1140)  Resp: 20 (09/21/22 1140)  BP: 132/79 (09/21/22 1140)  SpO2: 99 % (09/21/22 1140) Vital Signs (24h Range):  Temp:  [98.1 °F (36.7 °C)-98.5 °F (36.9 °C)] 98.1 °F (36.7 °C)  Pulse:  [81-87] 81  Resp:  [16-20] 20  SpO2:  [95 %-99 %] 99 %  BP: (120-168)/(79-92) 132/79     Weight: 103 kg (227 lb 1.2 oz)  Body mass index is 29.15 kg/m².    Intake/Output Summary (Last 24 hours) at 9/21/2022 1214  Last data filed at 9/21/2022 0612  Gross per 24 hour   Intake 279.56 ml   Output 300 ml   Net -20.44 ml      Physical Exam  Vitals and nursing note reviewed.   Eyes:      General:         Right eye: No discharge.         Left eye: No discharge.   Cardiovascular:      Heart sounds: No murmur heard.     No friction rub. No gallop.   Pulmonary:      Effort: No respiratory distress.      Breath sounds: No stridor. No wheezing or rhonchi.   Chest:      Chest wall: No tenderness.   Abdominal:      General: There is no distension.      Palpations: There is no mass.      Tenderness: There is no abdominal tenderness. There is no right CVA tenderness, left CVA tenderness, guarding or rebound.      Hernia: No hernia is present.   Musculoskeletal:         General: No swelling, tenderness, deformity or signs of injury.      Right lower leg: No edema.      Left lower leg: No edema.      Comments: Rt foot with kerlix dressing    Skin:     Coloration: Skin is not jaundiced or pale.      Findings: No bruising, erythema, lesion or rash.       Significant Labs: All pertinent labs within the past 24 hours have been reviewed.      Significant Imaging: I have reviewed all pertinent imaging results/findings within the past 24 hours.

## 2022-09-21 NOTE — PROGRESS NOTES
[facility] - Telemetry ([facility])  [facility]  Progress Note    Patient Name: Henry Fernandez  MRN: [id number]  Patient Class: IP- Inpatient   Admission Date: 9/14/2022  Length of Stay: 7 days  Attending Physician: Evens Mccauley, *  Primary Care Provider: No Maher MD        Subjective:     Principal Problem:Diabetic ulcer of right foot        HPI:  Mr Fernandez is a 52 year old male with PMHx of HTN, Stroke, DM, HLD and gastroparesis and cholecystectomy who presents to [facility] from [facility] for further treatment of Right diabetic foot wound. He ws transferred for Podiatry and Infectious Disease services. Denies associated symptoms of chest pain, shortness of breath, fever or chills. According to [facility]  MD, Blood cultures at previous hospital positive for Staph schleiferi. He was being treated with Rocephin and Vancomycin.  MRI of the RLE showed an 8.4cm dorsal fluid collection, with a possibilty of osteo. Patient is a full code, sighficant other and mother are surrogate Decision maker. Patient is being admitted under the care of [facility],.       Overview/Hospital Course:  Patient is a 52 year old male who presented to [facility] from [facility] for Podiatry and Infectious Disease Services. Podiatry following, patient under I & D at bedside last night. He was taken for surgery today and underwent I & D, Debridement and bone biopsy. Post procedure, patient doing well, vital signs stable. Will continue IV antibiotics, possible wound vac in 1-2 days.     9/16 He denies any complaint for today . He is s/p I&D of Right foot Diabetic wound and abscess  per Podiatrist . The WBC is trending up and IVAB change fron rocephin to Cefepiem and metronidazol ID consulted .     9/17 he is complaining of nause /vomiting . The wound cx are (+) for staph . The kidney fucntion is improving . Cont IVAB  and wound care     9/18 Wond cx (+) for staph . NAEON . CM  consulted for d/c planning . Will need 6 week IVAB .     9/19  ID consulted . NAEON . Plan  for a  PICC line today  . The Blood  Cx are NGTD . No new complaint .     9/20 Wound vac in place . PT/OT rec SNF . Wound cx (+) for MSSA.  NAEON .     9/21 Wound vac placed yesterday . PT/OT rec SNF placement .  NAEON . CM consulted for placement .      Interval History:     Review of Systems   Constitutional:  Positive for activity change. Negative for chills, diaphoresis, fatigue and fever.   HENT:  Negative for congestion, ear discharge, rhinorrhea, sinus pressure, sore throat and trouble swallowing.    Eyes:  Negative for discharge and visual disturbance.   Respiratory:  Negative for apnea, cough, choking, chest tightness, shortness of breath, wheezing and stridor.    Cardiovascular:  Positive for leg swelling (right leg swelling). Negative for chest pain and palpitations.   Gastrointestinal:  Negative for abdominal distention, abdominal pain, diarrhea, nausea and vomiting.   Endocrine: Negative for cold intolerance and heat intolerance.   Genitourinary:  Negative for dysuria, frequency and hematuria.   Musculoskeletal:  Negative for arthralgias, back pain, myalgias and neck pain.        Left side weakness    Skin:  Positive for wound (right foot diabetic wound). Negative for pallor and rash.   Neurological:  Positive for weakness. Negative for dizziness, seizures, syncope and headaches.   Psychiatric/Behavioral:  Negative for agitation, confusion and sleep disturbance.    Objective:     Vital Signs (Most Recent):  Temp: 98.1 °F (36.7 °C) (09/21/22 1140)  Pulse: 81 (09/21/22 1140)  Resp: 20 (09/21/22 1140)  BP: 132/79 (09/21/22 1140)  SpO2: 99 % (09/21/22 1140) Vital Signs (24h Range):  Temp:  [98.1 °F (36.7 °C)-98.5 °F (36.9 °C)] 98.1 °F (36.7 °C)  Pulse:  [81-87] 81  Resp:  [16-20] 20  SpO2:  [95 %-99 %] 99 %  BP: (120-168)/(79-92) 132/79     Weight: 103 kg (227 lb 1.2 oz)  Body mass index is 29.15  kg/m².    Intake/Output Summary (Last 24 hours) at 9/21/2022 1214  Last data filed at 9/21/2022 0612  Gross per 24 hour   Intake 279.56 ml   Output 300 ml   Net -20.44 ml      Physical Exam  Vitals and nursing note reviewed.   Eyes:      General:         Right eye: No discharge.         Left eye: No discharge.   Cardiovascular:      Heart sounds: No murmur heard.    No friction rub. No gallop.   Pulmonary:      Effort: No respiratory distress.      Breath sounds: No stridor. No wheezing or rhonchi.   Chest:      Chest wall: No tenderness.   Abdominal:      General: There is no distension.      Palpations: There is no mass.      Tenderness: There is no abdominal tenderness. There is no right CVA tenderness, left CVA tenderness, guarding or rebound.      Hernia: No hernia is present.   Musculoskeletal:         General: No swelling, tenderness, deformity or signs of injury.      Right lower leg: No edema.      Left lower leg: No edema.      Comments: Rt foot with kerlix dressing    Skin:     Coloration: Skin is not jaundiced or pale.      Findings: No bruising, erythema, lesion or rash.       Significant Labs: All pertinent labs within the past 24 hours have been reviewed.      Significant Imaging: I have reviewed all pertinent imaging results/findings within the past 24 hours.        Assessment/Plan:      * Diabetic ulcer of right foot with abscess and medial cuneiform osteomyelitis   S/P I&D per  Podiatrist   S/P Bone cx   Wound cx (+) for staph  MSSA  Cont IVAB  Will need 6 week of IVAB    Wound vac in place  PT/OT rec SNF       CKD (chronic kidney disease) stage 3, GFR 30-59 ml/min    Review of some previous labs, looks like renal function around baseline   Will start gentle hydration and assess response     stable     Charcot's joint of foot, right  Podiatry following       Abscess of foot without toes, right  9/15   S/P- I & D, Debridement and bone biopsy today   Monitor wound cultures   Possible wound vac in 1-2  days   Continue IV antibiotic  ID consulted          Bacteremia  Blood culture at previous hospital positive for Staph Schleiferi   Repeat Blood culture   Consult ID   Continue Vanco and Rocephin       9/15/2022  Repeat BC, no growth to date   Continue IV Vanco and Rocephin   ID consulted       Repeated Blood cx NGTD     History of stroke  History of Stroke with  Left sided weakness   Continue Statin   Lipid panel   Hold Plavix for possible surgery       Resume plavix    Hypertension  Cont home BP medication     Diabetes mellitus  Patient's FSGs are uncontrolled due to hyperglycemia on current medication regimen.  Last A1c reviewed-   Lab Results   Component Value Date    HGBA1C 9.5 (H) 09/14/2022     Most recent fingerstick glucose reviewed-   Recent Labs   Lab 09/20/22  1609 09/20/22  2100 09/21/22  0552 09/21/22  1137   POCTGLUCOSE 179* 181* 120* 234*     Current correctional scale  Medium  Maintain anti-hyperglycemic dose as follows-   Antihyperglycemics (From admission, onward)    Start     Stop Route Frequency Ordered    09/17/22 2100  insulin detemir U-100 pen 30 Units         -- SubQ Nightly 09/17/22 1339    09/14/22 1758  insulin aspart U-100 pen 1-10 Units         -- SubQ Before meals & nightly PRN 09/14/22 1658        Hold Oral hypoglycemics while patient is in the hospital.    Diabetic gastroparesis  Patient's FSGs are uncontrolled due to hyperglycemia on current medication regimen.  Last A1c reviewed-   Lab Results   Component Value Date    HGBA1C 9.5 (H) 09/14/2022     Most recent fingerstick glucose reviewed-   Recent Labs   Lab 09/20/22  1609 09/20/22  2100 09/21/22  0552 09/21/22  1137   POCTGLUCOSE 179* 181* 120* 234*     Current correctional scale  Medium  Maintain anti-hyperglycemic dose as follows-   Antihyperglycemics (From admission, onward)    Start     Stop Route Frequency Ordered    09/17/22 2100  insulin detemir U-100 pen 30 Units         -- SubQ Nightly 09/17/22 1339    09/14/22 1758   insulin aspart U-100 pen 1-10 Units         -- SubQ Before meals & nightly PRN 09/14/22 1658        Hold Oral hypoglycemics while patient is in the hospital.    Antemetic as needed       VTE Risk Mitigation (From admission, onward)         Ordered     IP VTE HIGH RISK PATIENT  Once         09/14/22 1652     Place sequential compression device  Until discontinued         09/14/22 1652                Discharge Planning   ZECHARIAH:      Code Status: Full Code   Is the patient medically ready for discharge?:     Reason for patient still in hospital (select all that apply): Treatment  Discharge Plan A: Skilled Nursing Facility                  Evens Mccauley MD  Department of Hospital Medicine   [place] - Telemetry ([facility])

## 2022-09-22 LAB
ALBUMIN SERPL BCP-MCNC: 2.2 G/DL (ref 3.5–5.2)
ALP SERPL-CCNC: 84 U/L (ref 55–135)
ALT SERPL W/O P-5'-P-CCNC: 24 U/L (ref 10–44)
ANION GAP SERPL CALC-SCNC: 10 MMOL/L (ref 8–16)
AST SERPL-CCNC: 39 U/L (ref 10–40)
BASOPHILS # BLD AUTO: 0.1 K/UL (ref 0–0.2)
BASOPHILS NFR BLD: 0.7 % (ref 0–1.9)
BILIRUB SERPL-MCNC: 0.4 MG/DL (ref 0.1–1)
BUN SERPL-MCNC: 13 MG/DL (ref 6–20)
CALCIUM SERPL-MCNC: 7.9 MG/DL (ref 8.7–10.5)
CHLORIDE SERPL-SCNC: 99 MMOL/L (ref 95–110)
CO2 SERPL-SCNC: 28 MMOL/L (ref 23–29)
CREAT SERPL-MCNC: 1.6 MG/DL (ref 0.5–1.4)
DIFFERENTIAL METHOD: ABNORMAL
EOSINOPHIL # BLD AUTO: 0.1 K/UL (ref 0–0.5)
EOSINOPHIL NFR BLD: 0.8 % (ref 0–8)
ERYTHROCYTE [DISTWIDTH] IN BLOOD BY AUTOMATED COUNT: 12.3 % (ref 11.5–14.5)
EST. GFR  (NO RACE VARIABLE): 52 ML/MIN/1.73 M^2
GLUCOSE SERPL-MCNC: 170 MG/DL (ref 70–110)
HCT VFR BLD AUTO: 29.7 % (ref 40–54)
HGB BLD-MCNC: 9.9 G/DL (ref 14–18)
IMM GRANULOCYTES # BLD AUTO: 0.13 K/UL (ref 0–0.04)
IMM GRANULOCYTES NFR BLD AUTO: 0.9 % (ref 0–0.5)
LYMPHOCYTES # BLD AUTO: 2.5 K/UL (ref 1–4.8)
LYMPHOCYTES NFR BLD: 18 % (ref 18–48)
MCH RBC QN AUTO: 29.6 PG (ref 27–31)
MCHC RBC AUTO-ENTMCNC: 33.3 G/DL (ref 32–36)
MCV RBC AUTO: 89 FL (ref 82–98)
MONOCYTES # BLD AUTO: 1.1 K/UL (ref 0.3–1)
MONOCYTES NFR BLD: 7.7 % (ref 4–15)
NEUTROPHILS # BLD AUTO: 10.1 K/UL (ref 1.8–7.7)
NEUTROPHILS NFR BLD: 71.9 % (ref 38–73)
NRBC BLD-RTO: 0 /100 WBC
PLATELET # BLD AUTO: 427 K/UL (ref 150–450)
PMV BLD AUTO: 8.6 FL (ref 9.2–12.9)
POCT GLUCOSE: 131 MG/DL (ref 70–110)
POCT GLUCOSE: 172 MG/DL (ref 70–110)
POCT GLUCOSE: 195 MG/DL (ref 70–110)
POCT GLUCOSE: 232 MG/DL (ref 70–110)
POTASSIUM SERPL-SCNC: 3.7 MMOL/L (ref 3.5–5.1)
PROT SERPL-MCNC: 6.4 G/DL (ref 6–8.4)
RBC # BLD AUTO: 3.35 M/UL (ref 4.6–6.2)
SODIUM SERPL-SCNC: 137 MMOL/L (ref 136–145)
WBC # BLD AUTO: 13.99 K/UL (ref 3.9–12.7)

## 2022-09-22 PROCEDURE — 85025 COMPLETE CBC W/AUTO DIFF WBC: CPT | Performed by: INTERNAL MEDICINE

## 2022-09-22 PROCEDURE — 63600175 PHARM REV CODE 636 W HCPCS: Performed by: PODIATRIST

## 2022-09-22 PROCEDURE — 36415 COLL VENOUS BLD VENIPUNCTURE: CPT | Performed by: INTERNAL MEDICINE

## 2022-09-22 PROCEDURE — 25000003 PHARM REV CODE 250: Performed by: INTERNAL MEDICINE

## 2022-09-22 PROCEDURE — 63600175 PHARM REV CODE 636 W HCPCS: Performed by: INTERNAL MEDICINE

## 2022-09-22 PROCEDURE — 80053 COMPREHEN METABOLIC PANEL: CPT | Performed by: INTERNAL MEDICINE

## 2022-09-22 PROCEDURE — 21400001 HC TELEMETRY ROOM

## 2022-09-22 PROCEDURE — 25000003 PHARM REV CODE 250: Performed by: PODIATRIST

## 2022-09-22 RX ADMIN — METRONIDAZOLE 500 MG: 500 TABLET ORAL at 09:09

## 2022-09-22 RX ADMIN — AMLODIPINE BESYLATE 10 MG: 10 TABLET ORAL at 08:09

## 2022-09-22 RX ADMIN — INSULIN ASPART 4 UNITS: 100 INJECTION, SOLUTION INTRAVENOUS; SUBCUTANEOUS at 12:09

## 2022-09-22 RX ADMIN — METRONIDAZOLE 500 MG: 500 TABLET ORAL at 05:09

## 2022-09-22 RX ADMIN — CEFAZOLIN SODIUM 2 G: 2 SOLUTION INTRAVENOUS at 12:09

## 2022-09-22 RX ADMIN — CEFAZOLIN SODIUM 2 G: 2 SOLUTION INTRAVENOUS at 05:09

## 2022-09-22 RX ADMIN — CEFAZOLIN SODIUM 2 G: 2 SOLUTION INTRAVENOUS at 09:09

## 2022-09-22 RX ADMIN — ATORVASTATIN CALCIUM 20 MG: 10 TABLET, FILM COATED ORAL at 08:09

## 2022-09-22 RX ADMIN — METRONIDAZOLE 500 MG: 500 TABLET ORAL at 01:09

## 2022-09-22 RX ADMIN — CLOPIDOGREL 75 MG: 75 TABLET, FILM COATED ORAL at 08:09

## 2022-09-22 NOTE — SUBJECTIVE & OBJECTIVE
Interval History:   52 year old male with PMHx of HTN, Stroke, DM, HLD and gastroparesis and cholecystectomy admitted with  Right diabetic foot wound and bacteremia.  Initial blood culture at outside facility - Staph schleiferi.   MRI of the RLE showed an 8.4cm dorsal fluid collection.  Wound cultures -09/15- staph aureus -MSSA    Review of Systems   Constitutional:  Negative for activity change, appetite change, chills, diaphoresis and fatigue.   HENT:  Negative for congestion, dental problem, ear discharge, ear pain and facial swelling.    Eyes:  Negative for pain, discharge and itching.   Respiratory:  Negative for apnea, cough, chest tightness and shortness of breath.    Cardiovascular:  Negative for chest pain and leg swelling.   Gastrointestinal:  Negative for abdominal distention and abdominal pain.   Endocrine: Negative for cold intolerance, heat intolerance and polydipsia.   Genitourinary:  Negative for difficulty urinating, dysuria and enuresis.   Musculoskeletal:  Negative for arthralgias and back pain.   Skin:  Positive for wound. Negative for color change and pallor.   Allergic/Immunologic: Negative for environmental allergies and food allergies.   Neurological:  Negative for dizziness, facial asymmetry, light-headedness and headaches.   Hematological:  Negative for adenopathy. Does not bruise/bleed easily.   Psychiatric/Behavioral:  Negative for agitation and behavioral problems.    Objective:     Vital Signs (Most Recent):  Temp: 98.2 °F (36.8 °C) (09/22/22 0003)  Pulse: 90 (09/22/22 0003)  Resp: 17 (09/22/22 0003)  BP: 129/86 (09/22/22 0003)  SpO2: 98 % (09/22/22 0003) Vital Signs (24h Range):  Temp:  [98 °F (36.7 °C)-98.8 °F (37.1 °C)] 98.2 °F (36.8 °C)  Pulse:  [81-97] 90  Resp:  [16-20] 17  SpO2:  [95 %-99 %] 98 %  BP: (120-145)/(79-95) 129/86     Weight: 103 kg (227 lb 1.2 oz)  Body mass index is 29.15 kg/m².    Estimated Creatinine Clearance: 74.6 mL/min (A) (based on SCr of 1.5 mg/dL  (H)).    Physical Exam  Vitals and nursing note reviewed.   Constitutional:       Appearance: He is well-developed.   HENT:      Head: Normocephalic and atraumatic.      Nose: Nose normal.   Eyes:      Comments: PERRL   Cardiovascular:      Rate and Rhythm: Normal rate and regular rhythm.      Heart sounds: Normal heart sounds.   Pulmonary:      Effort: Pulmonary effort is normal. No respiratory distress.      Breath sounds: Normal breath sounds. No wheezing or rales.   Abdominal:      Tenderness: There is no abdominal tenderness.   Musculoskeletal:         General: Normal range of motion.      Cervical back: Normal range of motion and neck supple.      Comments: Right foot weakness   Skin:     General: Skin is dry.   Neurological:      Mental Status: He is alert and oriented to person, place, and time.       Significant Labs: Blood Culture:   Recent Labs   Lab 09/14/22 1726 09/14/22 1918   LABBLOO No growth after 5 days. No growth after 5 days.     BMP: No results for input(s): GLU, NA, K, CL, CO2, BUN, CREATININE, CALCIUM, MG in the last 48 hours.  CBC: No results for input(s): WBC, HGB, HCT, PLT in the last 48 hours.  CMP: No results for input(s): NA, K, CL, CO2, GLU, BUN, CREATININE, CALCIUM, PROT, ALBUMIN, BILITOT, ALKPHOS, AST, ALT, ANIONGAP, EGFRNONAA in the last 48 hours.    Invalid input(s): [facility]  Microbiology Results (last 7 days)       Procedure Component Value Units Date/Time    Culture, Anaerobe [[id number]] Collected: 09/15/22 1247    Order Status: Completed Specimen: Abscess from Foot, Right Updated: 09/20/22 0659     Anaerobic Culture No anaerobes isolated    Blood culture [[id number]] Collected: 09/14/22 1918    Order Status: Completed Specimen: Blood from Peripheral, Right Arm Updated: 09/20/22 0612     Blood Culture, Routine No growth after 5 days.    Narrative:      Specimen # 2  Collection has been rescheduled by ADM1 at 09/14/2022 17:27 Reason:   Talk With Nurse Annie Will Come Back In  30 for Second Set  Collection has been rescheduled by ADM1 at 09/14/2022 17:27 Reason:   Talk With Nurse Annie Will Come Back In 30 for Second Set    Blood culture [[id number]] Collected: 09/14/22 1726    Order Status: Completed Specimen: Blood from Peripheral, Right Arm Updated: 09/20/22 0612     Blood Culture, Routine No growth after 5 days.    Narrative:      Specimen # 1    Tissue culture [[id number]]  (Abnormal)  (Susceptibility) Collected: 09/15/22 1246    Order Status: Completed Specimen: Tissue from Foot, Right Updated: 09/19/22 1428     Aerobic Culture - Tissue STAPHYLOCOCCUS AUREUS  Moderate       Gram Stain Result Few WBC's      Rare Gram positive cocci    Narrative:      Medial Cuneiform Right Foot Tissue Culture    Aerobic culture [[id number]]  (Abnormal)  (Susceptibility) Collected: 09/15/22 1247    Order Status: Completed Specimen: Abscess from Foot, Right Updated: 09/19/22 1428     Aerobic Bacterial Culture STAPHYLOCOCCUS AUREUS  Few      Culture, MRSA [[id number]] Collected: 09/14/22 1719    Order Status: Completed Specimen: MRSA source from Nares, Left Updated: 09/16/22 0738     MRSA Surveillance Screen No MRSA isolated            Significant Imaging: I have reviewed all pertinent imaging results/findings within the past 24 hours.

## 2022-09-22 NOTE — PROGRESS NOTES
Formerly [facility], [facility]–1983 - Telemetry ([facility])  [facility]  Progress Note    Patient Name: Henry Fernandez  MRN: [id number]  Patient Class: IP- Inpatient   Admission Date: 9/14/2022  Length of Stay: 8 days  Attending Physician: Evens Mccauley, *  Primary Care Provider: No Maher MD        Subjective:     Principal Problem:Diabetic ulcer of right foot        HPI:  Mr Fernandez is a 52 year old male with PMHx of HTN, Stroke, DM, HLD and gastroparesis and cholecystectomy who presents to [facility] from [facility] for further treatment of Right diabetic foot wound. He ws transferred for Podiatry and Infectious Disease services. Denies associated symptoms of chest pain, shortness of breath, fever or chills. According to [facility]  MD, Blood cultures at previous hospital positive for Staph schleiferi. He was being treated with Rocephin and Vancomycin.  MRI of the RLE showed an 8.4cm dorsal fluid collection, with a possibilty of osteo. Patient is a full code, sighficant other and mother are surrogate Decision maker. Patient is being admitted under the care of [facility],.       Overview/Hospital Course:  Patient is a 52 year old male who presented to [facility] from [facility] for Podiatry and Infectious Disease Services. Podiatry following, patient under I & D at bedside last night. He was taken for surgery today and underwent I & D, Debridement and bone biopsy. Post procedure, patient doing well, vital signs stable. Will continue IV antibiotics, possible wound vac in 1-2 days.     9/16 He denies any complaint for today . He is s/p I&D of Right foot Diabetic wound and abscess  per Podiatrist . The WBC is trending up and IVAB change fron rocephin to Cefepiem and metronidazol ID consulted .     9/17 he is complaining of nause /vomiting . The wound cx are (+) for staph . The kidney fucntion is improving . Cont IVAB  and wound care     9/18 Wond cx (+) for staph . NAEON . CM  consulted for d/c planning . Will need 6 week IVAB .     9/19  ID consulted . NAEON . Plan  for a  PICC line today  . The Blood  Cx are NGTD . No new complaint .     9/20 Wound vac in place . PT/OT rec SNF . Wound cx (+) for MSSA.  NAEON .     9/21 Wound vac placed yesterday . PT/OT rec SNF placement .  NAEON . CM consulted for placement .  9/22 NAEON . Aawaiting for placememnt . Wound vac in place . ID cosnult noted .       Interval History:     Review of Systems   Constitutional:  Positive for activity change. Negative for chills, diaphoresis, fatigue and fever.   HENT:  Negative for congestion, ear discharge, rhinorrhea, sinus pressure, sore throat and trouble swallowing.    Eyes:  Negative for discharge and visual disturbance.   Respiratory:  Negative for apnea, cough, choking, chest tightness, shortness of breath, wheezing and stridor.    Cardiovascular:  Positive for leg swelling (right leg swelling). Negative for chest pain and palpitations.   Gastrointestinal:  Negative for abdominal distention, abdominal pain, diarrhea, nausea and vomiting.   Endocrine: Negative for cold intolerance and heat intolerance.   Genitourinary:  Negative for dysuria, frequency and hematuria.   Musculoskeletal:  Negative for arthralgias, back pain, myalgias and neck pain.        Left side weakness    Skin:  Positive for wound (right foot diabetic wound). Negative for pallor and rash.   Neurological:  Positive for weakness. Negative for dizziness, seizures, syncope and headaches.   Psychiatric/Behavioral:  Negative for agitation, confusion and sleep disturbance.    Objective:     Vital Signs (Most Recent):  Temp: 98.1 °F (36.7 °C) (09/22/22 1152)  Pulse: 83 (09/22/22 1152)  Resp: 18 (09/22/22 1152)  BP: 136/79 (09/22/22 1152)  SpO2: 97 % (09/22/22 1152) Vital Signs (24h Range):  Temp:  [98 °F (36.7 °C)-98.9 °F (37.2 °C)] 98.1 °F (36.7 °C)  Pulse:  [81-97] 83  Resp:  [17-20] 18  SpO2:  [94 %-98 %] 97 %  BP: (129-145)/(79-95) 136/79      Weight: 103 kg (227 lb 1.2 oz)  Body mass index is 29.15 kg/m².    Intake/Output Summary (Last 24 hours) at 9/22/2022 1220  Last data filed at 9/22/2022 0539  Gross per 24 hour   Intake 144.3 ml   Output 400 ml   Net -255.7 ml      Physical Exam  Vitals and nursing note reviewed.   Constitutional:       Appearance: He is well-developed.   HENT:      Head: Normocephalic and atraumatic.      Nose: Nose normal.   Eyes:      Comments: PERRL   Cardiovascular:      Rate and Rhythm: Normal rate and regular rhythm.      Heart sounds: Normal heart sounds.   Pulmonary:      Effort: Pulmonary effort is normal. No respiratory distress.      Breath sounds: Normal breath sounds. No wheezing or rales.   Abdominal:      Tenderness: There is no abdominal tenderness.   Musculoskeletal:         General: Normal range of motion.      Cervical back: Normal range of motion and neck supple.      Comments: Right foot weakness   Skin:     General: Skin is dry.   Neurological:      Mental Status: He is alert and oriented to person, place, and time.       Significant Labs: All pertinent labs within the past 24 hours have been reviewed.      Significant Imaging: I have reviewed all pertinent imaging results/findings within the past 24 hours.        Assessment/Plan:      * Diabetic ulcer of right foot with abscess and medial cuneiform osteomyelitis   S/P I&D per  Podiatrist   S/P Bone cx   Wound cx (+) for staph  MSSA  Cont IVAB  Will need 6 week of IVAB    Wound vac in place  PT/OT rec SNF       CKD (chronic kidney disease) stage 3, GFR 30-59 ml/min    Review of some previous labs, looks like renal function around baseline   Will start gentle hydration and assess response     stable     Charcot's joint of foot, right  Podiatry following       Abscess of foot without toes, right  9/15   S/P- I & D, Debridement and bone biopsy today   Monitor wound cultures   Possible wound vac in 1-2 days   Continue IV antibiotic  ID consulted           Bacteremia  Blood culture at previous hospital positive for Staph Schleiferi   Repeat Blood culture   Consult ID   Continue Vanco and Rocephin       9/15/2022  Repeat BC, no growth to date   Continue IV Vanco and Rocephin   ID consulted       Repeated Blood cx NGTD     History of stroke  History of Stroke with  Left sided weakness   Continue Statin   Lipid panel   Hold Plavix for possible surgery       Resume plavix    Hypertension  Cont home BP medication     Diabetes mellitus  Patient's FSGs are uncontrolled due to hyperglycemia on current medication regimen.  Last A1c reviewed-   Lab Results   Component Value Date    HGBA1C 9.5 (H) 09/14/2022     Most recent fingerstick glucose reviewed-   Recent Labs   Lab 09/21/22 2017 09/22/22  0600 09/22/22  1155   POCTGLUCOSE 219* 195* 232*     Current correctional scale  Medium  Maintain anti-hyperglycemic dose as follows-   Antihyperglycemics (From admission, onward)    Start     Stop Route Frequency Ordered    09/17/22 2100  insulin detemir U-100 pen 30 Units         -- SubQ Nightly 09/17/22 1339    09/14/22 1758  insulin aspart U-100 pen 1-10 Units         -- SubQ Before meals & nightly PRN 09/14/22 1658        Hold Oral hypoglycemics while patient is in the hospital.    Diabetes type 2, uncontrolled  Patient's FSGs are controlled on current medication regimen.  Last A1c reviewed-   Lab Results   Component Value Date    HGBA1C 9.5 (H) 09/14/2022     Most recent fingerstick glucose reviewed-   Recent Labs   Lab 09/21/22 2017 09/22/22  0600 09/22/22  1155   POCTGLUCOSE 219* 195* 232*     Current correctional scale  Medium  Maintain anti-hyperglycemic dose as follows-   Antihyperglycemics (From admission, onward)    Start     Stop Route Frequency Ordered    09/17/22 2100  insulin detemir U-100 pen 30 Units         -- SubQ Nightly 09/17/22 1339    09/14/22 1758  insulin aspart U-100 pen 1-10 Units         -- SubQ Before meals & nightly PRN 09/14/22 1658        Hold  Oral hypoglycemics while patient is in the hospital.    Diabetic gastroparesis  Patient's FSGs are uncontrolled due to hyperglycemia on current medication regimen.  Last A1c reviewed-   Lab Results   Component Value Date    HGBA1C 9.5 (H) 09/14/2022     Most recent fingerstick glucose reviewed-   Recent Labs   Lab 09/21/22  2017 09/22/22  0600 09/22/22  1155   POCTGLUCOSE 219* 195* 232*     Current correctional scale  Medium  Maintain anti-hyperglycemic dose as follows-   Antihyperglycemics (From admission, onward)    Start     Stop Route Frequency Ordered    09/17/22 2100  insulin detemir U-100 pen 30 Units         -- SubQ Nightly 09/17/22 1339    09/14/22 1758  insulin aspart U-100 pen 1-10 Units         -- SubQ Before meals & nightly PRN 09/14/22 1658        Hold Oral hypoglycemics while patient is in the hospital.    Antemetic as needed       VTE Risk Mitigation (From admission, onward)         Ordered     IP VTE HIGH RISK PATIENT  Once         09/14/22 1652     Place sequential compression device  Until discontinued         09/14/22 1652                Discharge Planning   ZECHARIAH:      Code Status: Full Code   Is the patient medically ready for discharge?:     Reason for patient still in hospital (select all that apply): Treatment  Discharge Plan A: Skilled Nursing Facility                  Evens Mccauley MD  Department of Hospital Medicine   [facility] - Telemetry ([facility])

## 2022-09-22 NOTE — ASSESSMENT & PLAN NOTE
Will plan to treat for 6 weeks with Daptomycin/Cefepime /flagyl -will use cultures to guide regime and make final antibiotic plan   Final plan will follow drug susceptibility of organism     09/1/22- will continue Cefazolin/Flagyl for 6 weels  EOC -10/26/22

## 2022-09-22 NOTE — ASSESSMENT & PLAN NOTE
Patient's FSGs are controlled on current medication regimen.  Last A1c reviewed-   Lab Results   Component Value Date    HGBA1C 9.5 (H) 09/14/2022     Most recent fingerstick glucose reviewed-   Recent Labs   Lab 09/21/22 2017 09/22/22  0600 09/22/22  1155   POCTGLUCOSE 219* 195* 232*     Current correctional scale  Medium  Maintain anti-hyperglycemic dose as follows-   Antihyperglycemics (From admission, onward)    Start     Stop Route Frequency Ordered    09/17/22 2100  insulin detemir U-100 pen 30 Units         -- SubQ Nightly 09/17/22 1339    09/14/22 1758  insulin aspart U-100 pen 1-10 Units         -- SubQ Before meals & nightly PRN 09/14/22 1658        Hold Oral hypoglycemics while patient is in the hospital.

## 2022-09-22 NOTE — SUBJECTIVE & OBJECTIVE
Interval History:     Review of Systems   Constitutional:  Positive for activity change. Negative for chills, diaphoresis, fatigue and fever.   HENT:  Negative for congestion, ear discharge, rhinorrhea, sinus pressure, sore throat and trouble swallowing.    Eyes:  Negative for discharge and visual disturbance.   Respiratory:  Negative for apnea, cough, choking, chest tightness, shortness of breath, wheezing and stridor.    Cardiovascular:  Positive for leg swelling (right leg swelling). Negative for chest pain and palpitations.   Gastrointestinal:  Negative for abdominal distention, abdominal pain, diarrhea, nausea and vomiting.   Endocrine: Negative for cold intolerance and heat intolerance.   Genitourinary:  Negative for dysuria, frequency and hematuria.   Musculoskeletal:  Negative for arthralgias, back pain, myalgias and neck pain.        Left side weakness    Skin:  Positive for wound (right foot diabetic wound). Negative for pallor and rash.   Neurological:  Positive for weakness. Negative for dizziness, seizures, syncope and headaches.   Psychiatric/Behavioral:  Negative for agitation, confusion and sleep disturbance.    Objective:     Vital Signs (Most Recent):  Temp: 98.1 °F (36.7 °C) (09/22/22 1152)  Pulse: 83 (09/22/22 1152)  Resp: 18 (09/22/22 1152)  BP: 136/79 (09/22/22 1152)  SpO2: 97 % (09/22/22 1152) Vital Signs (24h Range):  Temp:  [98 °F (36.7 °C)-98.9 °F (37.2 °C)] 98.1 °F (36.7 °C)  Pulse:  [81-97] 83  Resp:  [17-20] 18  SpO2:  [94 %-98 %] 97 %  BP: (129-145)/(79-95) 136/79     Weight: 103 kg (227 lb 1.2 oz)  Body mass index is 29.15 kg/m².    Intake/Output Summary (Last 24 hours) at 9/22/2022 1220  Last data filed at 9/22/2022 0539  Gross per 24 hour   Intake 144.3 ml   Output 400 ml   Net -255.7 ml      Physical Exam  Vitals and nursing note reviewed.   Constitutional:       Appearance: He is well-developed.   HENT:      Head: Normocephalic and atraumatic.      Nose: Nose normal.   Eyes:       Comments: PERRL   Cardiovascular:      Rate and Rhythm: Normal rate and regular rhythm.      Heart sounds: Normal heart sounds.   Pulmonary:      Effort: Pulmonary effort is normal. No respiratory distress.      Breath sounds: Normal breath sounds. No wheezing or rales.   Abdominal:      Tenderness: There is no abdominal tenderness.   Musculoskeletal:         General: Normal range of motion.      Cervical back: Normal range of motion and neck supple.      Comments: Right foot weakness   Skin:     General: Skin is dry.   Neurological:      Mental Status: He is alert and oriented to person, place, and time.       Significant Labs: All pertinent labs within the past 24 hours have been reviewed.      Significant Imaging: I have reviewed all pertinent imaging results/findings within the past 24 hours.

## 2022-09-22 NOTE — PLAN OF CARE
Patient accepted by [facility].  Submitting to [employer] for authorization.       09/22/22 1346   Post-Acute Status   Post-Acute Authorization Placement   Post-Acute Placement Status Pending payor review/awaiting authorization (if required)   Discharge Plan   Discharge Plan A Skilled Nursing Facility

## 2022-09-22 NOTE — ASSESSMENT & PLAN NOTE
Patient's FSGs are uncontrolled due to hyperglycemia on current medication regimen.  Last A1c reviewed-   Lab Results   Component Value Date    HGBA1C 9.5 (H) 09/14/2022     Most recent fingerstick glucose reviewed-   Recent Labs   Lab 09/21/22 2017 09/22/22  0600 09/22/22  1155   POCTGLUCOSE 219* 195* 232*     Current correctional scale  Medium  Maintain anti-hyperglycemic dose as follows-   Antihyperglycemics (From admission, onward)    Start     Stop Route Frequency Ordered    09/17/22 2100  insulin detemir U-100 pen 30 Units         -- SubQ Nightly 09/17/22 1339    09/14/22 1758  insulin aspart U-100 pen 1-10 Units         -- SubQ Before meals & nightly PRN 09/14/22 1658        Hold Oral hypoglycemics while patient is in the hospital.    Antemetic as needed

## 2022-09-22 NOTE — PROGRESS NOTES
[facility])  Infectious Disease  Progress Note    Patient Name: Henry Fernandez  MRN: [id number]  Admission Date: 9/14/2022  Length of Stay: 8 days  Attending Physician: Evens Mccauley, *  Primary Care Provider: No Maher MD    Isolation Status: No active isolations  Assessment/Plan:      * Diabetic ulcer of right foot with abscess and medial cuneiform osteomyelitis   Will plan to treat for 6 weeks with Daptomycin/Cefepime /flagyl -will use cultures to guide regime and make final antibiotic plan   Final plan will follow drug susceptibility of organism     09/1/22- will continue Cefazolin/Flagyl for 6 weels  EOC -10/26/22    Abscess of foot without toes, right  S/p I and [id number], will continue cefazolin    Hypertension  BP control as per primary team    Diabetes mellitus  Diabetic diet ,  Insulin sliding scale     Diabetes type 2, uncontrolled  Insulin sliding scale regime as per primary team        Anticipated Disposition:     Thank you for your consult. I will follow-up with patient. Please contact us if you have any additional questions.    Santiago Fuller MD, FACP  Infectious Disease  [facility]    Subjective:     Principal Problem:Diabetic ulcer of right foot    HPI:   52 year old male with PMHx of HTN, Stroke, DM, HLD and gastroparesis and cholecystectomy who presents to [facility] from [facility] for further treatment of Right diabetic foot wound.  Initial blood culture at outside facility - Staph schleiferi.   MRI of the RLE showed an 8.4cm dorsal fluid collection.  Wound cultures -09/15- staph aureus   He was seen by podiatry and had on 09/15-   Incision and drainage of right foot  2. Bone biopsy, right medial cuneiform\  Component      Latest Ref Rng & Units 9/19/2022 9/18/2022 9/16/2022   WBC      3.90 - 12.70 K/uL 15.57 (H) 14.58 (H) 30.83 (H)     Interval History:   52 year old male with PMHx of HTN, Stroke, DM, HLD and gastroparesis and  cholecystectomy admitted with  Right diabetic foot wound and bacteremia.  Initial blood culture at outside facility - Staph schleiferi.   MRI of the RLE showed an 8.4cm dorsal fluid collection.  Wound cultures -09/15- staph aureus -MSSA    Review of Systems   Constitutional:  Negative for activity change, appetite change, chills, diaphoresis and fatigue.   HENT:  Negative for congestion, dental problem, ear discharge, ear pain and facial swelling.    Eyes:  Negative for pain, discharge and itching.   Respiratory:  Negative for apnea, cough, chest tightness and shortness of breath.    Cardiovascular:  Negative for chest pain and leg swelling.   Gastrointestinal:  Negative for abdominal distention and abdominal pain.   Endocrine: Negative for cold intolerance, heat intolerance and polydipsia.   Genitourinary:  Negative for difficulty urinating, dysuria and enuresis.   Musculoskeletal:  Negative for arthralgias and back pain.   Skin:  Positive for wound. Negative for color change and pallor.   Allergic/Immunologic: Negative for environmental allergies and food allergies.   Neurological:  Negative for dizziness, facial asymmetry, light-headedness and headaches.   Hematological:  Negative for adenopathy. Does not bruise/bleed easily.   Psychiatric/Behavioral:  Negative for agitation and behavioral problems.    Objective:     Vital Signs (Most Recent):  Temp: 98.2 °F (36.8 °C) (09/22/22 0003)  Pulse: 90 (09/22/22 0003)  Resp: 17 (09/22/22 0003)  BP: 129/86 (09/22/22 0003)  SpO2: 98 % (09/22/22 0003) Vital Signs (24h Range):  Temp:  [98 °F (36.7 °C)-98.8 °F (37.1 °C)] 98.2 °F (36.8 °C)  Pulse:  [81-97] 90  Resp:  [16-20] 17  SpO2:  [95 %-99 %] 98 %  BP: (120-145)/(79-95) 129/86     Weight: 103 kg (227 lb 1.2 oz)  Body mass index is 29.15 kg/m².    Estimated Creatinine Clearance: 74.6 mL/min (A) (based on SCr of 1.5 mg/dL (H)).    Physical Exam  Vitals and nursing note reviewed.   Constitutional:       Appearance: He is  well-developed.   HENT:      Head: Normocephalic and atraumatic.      Nose: Nose normal.   Eyes:      Comments: PERRL   Cardiovascular:      Rate and Rhythm: Normal rate and regular rhythm.      Heart sounds: Normal heart sounds.   Pulmonary:      Effort: Pulmonary effort is normal. No respiratory distress.      Breath sounds: Normal breath sounds. No wheezing or rales.   Abdominal:      Tenderness: There is no abdominal tenderness.   Musculoskeletal:         General: Normal range of motion.      Cervical back: Normal range of motion and neck supple.      Comments: Right foot weakness   Skin:     General: Skin is dry.   Neurological:      Mental Status: He is alert and oriented to person, place, and time.       Significant Labs: Blood Culture:   Recent Labs   Lab 09/14/22  1726 09/14/22 1918   LABBLOO No growth after 5 days. No growth after 5 days.     BMP: No results for input(s): GLU, NA, K, CL, CO2, BUN, CREATININE, CALCIUM, MG in the last 48 hours.  CBC: No results for input(s): WBC, HGB, HCT, PLT in the last 48 hours.  CMP: No results for input(s): NA, K, CL, CO2, GLU, BUN, CREATININE, CALCIUM, PROT, ALBUMIN, BILITOT, ALKPHOS, AST, ALT, ANIONGAP, EGFRNONAA in the last 48 hours.    Invalid input(s): [place]  Microbiology Results (last 7 days)       Procedure Component Value Units Date/Time    Culture, Anaerobe [[id number]] Collected: 09/15/22 1247    Order Status: Completed Specimen: Abscess from Foot, Right Updated: 09/20/22 0659     Anaerobic Culture No anaerobes isolated    Blood culture [[id number]] Collected: 09/14/22 1918    Order Status: Completed Specimen: Blood from Peripheral, Right Arm Updated: 09/20/22 0612     Blood Culture, Routine No growth after 5 days.    Narrative:      Specimen # 2  Collection has been rescheduled by ADM1 at 09/14/2022 17:27 Reason:   Talk With Nurse Annie Will Come Back In 30 for Second Set  Collection has been rescheduled by ADM1 at 09/14/2022 17:27 Reason:   Talk With  Nurse Annie Will Come Back In 30 for Second Set    Blood culture [[id number]] Collected: 09/14/22 1726    Order Status: Completed Specimen: Blood from Peripheral, Right Arm Updated: 09/20/22 0612     Blood Culture, Routine No growth after 5 days.    Narrative:      Specimen # 1    Tissue culture [[id number]]  (Abnormal)  (Susceptibility) Collected: 09/15/22 1246    Order Status: Completed Specimen: Tissue from Foot, Right Updated: 09/19/22 1428     Aerobic Culture - Tissue STAPHYLOCOCCUS AUREUS  Moderate       Gram Stain Result Few WBC's      Rare Gram positive cocci    Narrative:      Medial Cuneiform Right Foot Tissue Culture    Aerobic culture [[id number]]  (Abnormal)  (Susceptibility) Collected: 09/15/22 1247    Order Status: Completed Specimen: Abscess from Foot, Right Updated: 09/19/22 1428     Aerobic Bacterial Culture STAPHYLOCOCCUS AUREUS  Few      Culture, MRSA [[id number]] Collected: 09/14/22 1719    Order Status: Completed Specimen: MRSA source from Nares, Left Updated: 09/16/22 0738     MRSA Surveillance Screen No MRSA isolated            Significant Imaging: I have reviewed all pertinent imaging results/findings within the past 24 hours.

## 2022-09-22 NOTE — ASSESSMENT & PLAN NOTE
Patient's FSGs are uncontrolled due to hyperglycemia on current medication regimen.  Last A1c reviewed-   Lab Results   Component Value Date    HGBA1C 9.5 (H) 09/14/2022     Most recent fingerstick glucose reviewed-   Recent Labs   Lab 09/21/22 2017 09/22/22  0600 09/22/22  1155   POCTGLUCOSE 219* 195* 232*     Current correctional scale  Medium  Maintain anti-hyperglycemic dose as follows-   Antihyperglycemics (From admission, onward)    Start     Stop Route Frequency Ordered    09/17/22 2100  insulin detemir U-100 pen 30 Units         -- SubQ Nightly 09/17/22 1339    09/14/22 1758  insulin aspart U-100 pen 1-10 Units         -- SubQ Before meals & nightly PRN 09/14/22 1658        Hold Oral hypoglycemics while patient is in the hospital.

## 2022-09-23 VITALS
WEIGHT: 227.06 LBS | TEMPERATURE: 99 F | SYSTOLIC BLOOD PRESSURE: 141 MMHG | BODY MASS INDEX: 29.14 KG/M2 | DIASTOLIC BLOOD PRESSURE: 87 MMHG | OXYGEN SATURATION: 95 % | HEIGHT: 74 IN | HEART RATE: 91 BPM | RESPIRATION RATE: 16 BRPM

## 2022-09-23 LAB
CRP SERPL-MCNC: 25.7 MG/L (ref 0–8.2)
POCT GLUCOSE: 123 MG/DL (ref 70–110)
PROCALCITONIN SERPL IA-MCNC: 0.17 NG/ML

## 2022-09-23 PROCEDURE — 86140 C-REACTIVE PROTEIN: CPT | Performed by: INTERNAL MEDICINE

## 2022-09-23 PROCEDURE — 25000003 PHARM REV CODE 250: Performed by: INTERNAL MEDICINE

## 2022-09-23 PROCEDURE — 97530 THERAPEUTIC ACTIVITIES: CPT

## 2022-09-23 PROCEDURE — 84145 PROCALCITONIN (PCT): CPT | Performed by: INTERNAL MEDICINE

## 2022-09-23 PROCEDURE — 63600175 PHARM REV CODE 636 W HCPCS: Performed by: PODIATRIST

## 2022-09-23 PROCEDURE — 25000003 PHARM REV CODE 250: Performed by: PODIATRIST

## 2022-09-23 PROCEDURE — 63600175 PHARM REV CODE 636 W HCPCS: Performed by: INTERNAL MEDICINE

## 2022-09-23 PROCEDURE — 97110 THERAPEUTIC EXERCISES: CPT

## 2022-09-23 RX ORDER — INSULIN ASPART 100 [IU]/ML
1-10 INJECTION, SOLUTION INTRAVENOUS; SUBCUTANEOUS
Refills: 0
Start: 2022-09-23 | End: 2023-09-23

## 2022-09-23 RX ORDER — METRONIDAZOLE 500 MG/1
500 TABLET ORAL EVERY 8 HOURS
Qty: 126 TABLET | Refills: 0
Start: 2022-09-16 | End: 2022-10-28

## 2022-09-23 RX ORDER — POLYETHYLENE GLYCOL 3350 17 G/17G
17 POWDER, FOR SOLUTION ORAL DAILY PRN
Refills: 0
Start: 2022-09-23

## 2022-09-23 RX ORDER — CEFAZOLIN SODIUM 2 G/50ML
2000 SOLUTION INTRAVENOUS EVERY 8 HOURS
Qty: 6300 ML | Refills: 0
Start: 2022-09-19 | End: 2022-10-31

## 2022-09-23 RX ADMIN — AMLODIPINE BESYLATE 10 MG: 10 TABLET ORAL at 08:09

## 2022-09-23 RX ADMIN — ONDANSETRON 4 MG: 2 INJECTION INTRAMUSCULAR; INTRAVENOUS at 12:09

## 2022-09-23 RX ADMIN — CEFAZOLIN SODIUM 2 G: 2 SOLUTION INTRAVENOUS at 12:09

## 2022-09-23 RX ADMIN — CLOPIDOGREL 75 MG: 75 TABLET, FILM COATED ORAL at 08:09

## 2022-09-23 RX ADMIN — CEFAZOLIN SODIUM 2 G: 2 SOLUTION INTRAVENOUS at 04:09

## 2022-09-23 RX ADMIN — ATORVASTATIN CALCIUM 20 MG: 10 TABLET, FILM COATED ORAL at 08:09

## 2022-09-23 RX ADMIN — METRONIDAZOLE 500 MG: 500 TABLET ORAL at 05:09

## 2022-09-23 NOTE — PLAN OF CARE
Pt oriented x4 VSS  Pt remained afebile throughout this shift  All meds administered per order.  Pt remianed free of falls  Plan of care reviewed. pt verbalizes understanding.  Patient moving/ turning independently.  Patient normal sinus on monitor  Bed low, side rails up x2, wheels locked, call light in reach.  Pt instructed to call for assistance    Problem: Adult Inpatient Plan of Care  Goal: Plan of Care Review  Outcome: Ongoing, Progressing  Goal: Patient-Specific Goal (Individualized)  Outcome: Ongoing, Progressing  Goal: Absence of Hospital-Acquired Illness or Injury  Outcome: Ongoing, Progressing  Goal: Optimal Comfort and Wellbeing  Outcome: Ongoing, Progressing  Goal: Readiness for Transition of Care  Outcome: Ongoing, Progressing     Problem: Diabetes Comorbidity  Goal: Blood Glucose Level Within Targeted Range  Outcome: Ongoing, Progressing     Problem: Infection  Goal: Absence of Infection Signs and Symptoms  Outcome: Ongoing, Progressing     Problem: Impaired Wound Healing  Goal: Optimal Wound Healing  Outcome: Ongoing, Progressing     Problem: Fall Injury Risk  Goal: Absence of Fall and Fall-Related Injury  Outcome: Ongoing, Progressing

## 2022-09-23 NOTE — PHYSICIAN QUERY
"PT Name: Henry Fernandez  MR #: [id number]    DOCUMENTATION CLARIFICATION     CDS/: CYN Devi, RN               Contact information:[email or web address]  This form is a permanent document in the medical record.    Query Date: September 23, 2022  By submitting this query, we are merely seeking further clarification of documentation. Please utilize your independent clinical judgment when addressing the question(s) below.    The Medical Record contains the following:   Indicator Supporting Clinical Findings Location in Medical Record    x Documentation of "Debridement" DEBRIDEMENT, FOOT    Debridement of ulceration right plantar foot    Ulceration present to the plantar aspect the medial foot measuring 13 x 15 mm was sharp debrided from the superficial thickened hyperkeratotic tissue surrounding the ulceration.  The dermal, epidermal, and subcutaneous tissue was involved in sharp debridement with a sterile 15 blade.   Podiatry Op Note 09/15/2022     Documentation of "I&D"      Other       Excisional debridement is the surgical removal or cutting away of such tissue, necrosis, or slough and is classified to the root operation "Excision." Use of a sharp instrument does not always indicate that an excisional debridement was performed. Minor removal of loose fragments with scissors or using a sharp instrument to scrape away tissue is not an excisional debridement. Excisional debridement involves the use of a scalpel to remove devitalized tissue.  Nonexcisional debridement is the nonoperative brushing, irrigating, scrubbing, or washing of devitalized tissue, necrosis, slough, or foreign material. Most nonexcisional debridement procedures are classified to the root operation "Extraction" (pulling or stripping out or off all or a portion of a body part by the use of force).     Provider, please provide further clarification on the procedure performed on _______(Right Foot)             :    [  X ] Excisional Debridement " of subcutaneous tissue/fascia        [   ] Non-excisional Debridement of subcutaneous tissue/fascia     [   ] Other Procedure (please specify): _____________   [  ] Clinically Undetermined         Form No. 13141

## 2022-09-23 NOTE — PLAN OF CARE
Received authorization from [facility].  Discharge paperwork sent to [facility] via [employer].    11:55am Patient transferring to [facility].  Please call report to [phone] nurse on the [address].  Their transportation is not running.  [facility] who estimated  in 2 hours.  Wound care is placing wet to dry dressing on wound and [facility] will place their wound vac and dressing upon arrival.       09/23/22 1036   Post-Acute Status   Post-Acute Authorization Placement   Post-Acute Placement Status Set-up Complete/Auth obtained   Discharge Plan   Discharge Plan A Skilled Nursing Facility

## 2022-09-23 NOTE — PT/OT/SLP PROGRESS
Physical Therapy Treatment    Patient Name:  Henry Fernandez   MRN:  [id number]    Recommendations:     Discharge Recommendations:  LTACH (long-term acute care hospital)   Discharge Equipment Recommendations:  (TBD AT NEXT LEVEL OF CARE)   Barriers to discharge: None    Assessment:     Henry Frenandez is a 52 y.o. male admitted with a medical diagnosis of Diabetic ulcer of right foot.  He presents with the following impairments/functional limitations:  weakness, impaired endurance, impaired functional mobility, gait instability, impaired balance, pain, decreased safety awareness, decreased lower extremity function, orthopedic precautions, decreased coordination.    Rehab Prognosis: Fair; patient would benefit from acute skilled PT services to address these deficits and reach maximum level of function.    Recent Surgery: Procedure(s) (LRB):  INCISION AND DRAINAGE, FOOT (Right)  DEBRIDEMENT, FOOT (Right)  BIOPSY, BONE (Right) 8 Days Post-Op    Plan:     During this hospitalization, patient to be seen 3 x/week to address the identified rehab impairments via therapeutic activities, therapeutic exercises and progress toward the following goals:    Plan of Care Expires:  09/30/22    Subjective     Chief Complaint:   Patient/Family Comments/goals:   Pain/Comfort:  Pain Rating 1: 0/10      Objective:     Communicated with NURSE SARAVIA prior to session.  Patient found supine with telemetry, wound vac, peripheral IV upon PT entry to room.     General Precautions: Standard, fall   Orthopedic Precautions:RLE non weight bearing   Braces: AFO (LLE)  Respiratory Status: Room air     Functional Mobility:  Bed Mobility:     Rolling Left:  modified independence  Scooting: modified independence  Supine to Sit: modified independence  Transfers:     Sit to Stand:  contact guard assistance with no AD  Bed to Chair: minimum assistance with  no AD  using  Stand Pivot, DIFFICULT TO COMPLY WITH NWB FOR RLE DUE TO LLE PARALYSIS  Balance:  "POOR+    AM-PAC 6 CLICK MOBILITY  Turning over in bed (including adjusting bedclothes, sheets and blankets)?: 4  Sitting down on and standing up from a chair with arms (e.g., wheelchair, bedside commode, etc.): 3  Moving from lying on back to sitting on the side of the bed?: 4  Moving to and from a bed to a chair (including a wheelchair)?: 3  Need to walk in hospital room?: 1  Climbing 3-5 steps with a railing?: 1  Basic Mobility Total Score: 16     Therapeutic Activities and Exercises:  REVIEW ROLE OF P.T. AND POC IN ACUTE CARE HOSPITAL SETTING, REVIEW NWB FOR RLE, PT TOLERATED WBING THROUGH LUE DOWN INTO BED FOR APPROX. 3 MINUTES WITH FACILITATION FROM THERAPIST, PT REQUIRED LILY FOR DONNING LLE AFO AND SHOE, PT ENCOURAGED TO INCREASE TIME OOB IN CHAIR TO TOLERANCE, EDUCATED IN AND ENCOURAGED TO PERFORM BLE THEREX WHILE SEATED OR SUPINE THROUGHOUT THE DAY TO TOLERANCE: HIP FLEX/EXT, QUAD SET, LAQ, HEEL SLIDES, AP'S.  PT AGREEABLE TO REQUESTS  PT EDUCATED ON RISK FOR FALLS DUE TO GENERALIZED WEAKNESS, EDUCATED ON "CALL DON'T FALL", ENCOURAGED TO CALL FOR ASSISTANCE WITH ALL NEEDS SUCH AS BED<>CHAIR TRANSFERS OR TRIPS TO BATHROOM, PT AGREEABLE TO SAFETY PRECAUTIONS    Patient left up in chair with all lines intact, call button in reach, chair alarm on, and NURSE notified..    GOALS:   Multidisciplinary Problems       Physical Therapy Goals          Problem: Physical Therapy    Goal Priority Disciplines Outcome Goal Variances Interventions   Physical Therapy Goal     PT, PT/OT Ongoing, Progressing     Description: Pt will perform bed mobility independently in order to participate in EOB activity.  Pt will perform transfers independently in order to participate in OOB activity.   Pt will ambulate 50ft CGA with LRAD while maintaining WB status (if any) in order to participate in daily tasks.    Pt will perform w/c mobility 50ft x 2 in order to safety navigate surroundings.                        Time Tracking:     PT " Received On: 09/23/22  PT Start Time: 0755     PT Stop Time: 0818  PT Total Time (min): 23 min     Billable Minutes: Therapeutic Activity 23    Treatment Type: Treatment  PT/PTA: PT     PTA Visit Number: 0     09/23/2022

## 2022-09-23 NOTE — PT/OT/SLP PROGRESS
Occupational Therapy   Treatment    Name: Henry Fernandez  MRN: [id number]  Admitting Diagnosis:  Diabetic ulcer of right foot  8 Days Post-Op    Recommendations:     Discharge Recommendations: nursing facility, skilled  Discharge Equipment Recommendations:   (TBD at next level of care)  Barriers to discharge:  None    Assessment:     Henry Fernandez is a 52 y.o. male with a medical diagnosis of Diabetic ulcer of right foot.  He presents with the following performance deficits affecting function are weakness, impaired endurance, impaired self care skills, impaired sensation, impaired functional mobility, abnormal tone, decreased ROM, impaired cardiopulmonary response to activity, impaired balance, orthopedic precautions, impaired fine motor, impaired skin, edema, decreased safety awareness.     Rehab Prognosis:  Good; patient would benefit from acute skilled OT services to address these deficits and reach maximum level of function.       Plan:     Patient to be seen 2 x/week to address the above listed problems via self-care/home management, therapeutic activities, therapeutic exercises, neuromuscular re-education  Plan of Care Expires: 09/30/22  Plan of Care Reviewed with: patient    Subjective     Pain/Comfort:  Pain Rating 1: 0/10    Objective:     Communicated with: NurseFlorecita, prior to session.  Patient found supine with telemetry, peripheral IV, wound vac, PICC upon OT entry to room.    General Precautions: Standard, fall   Orthopedic Precautions:RLE non weight bearing   Braces: AFO (L LE)  Respiratory Status: Room air    Bed Mobility:    Patient completed Supine to Sit with modified independence and with side rail     Functional Mobility/Transfers:  Patient completed Sit <> Stand Transfer with contact guard assistance  with  no assistive device   Patient completed Bed <> Chair Transfer using Stand Pivot technique with minimum assistance with no assistive device  Functional Mobility: not completed due to inability to  maintain NWB  Transfer completed safely, but minimizing time in standing to decrease duration of time on R LE as patient is unable to maintain WB precaution.    [facility] 6 Click ADL: 18    Treatment & Education:  Patient tolerated intervention well this date. While seated EOB engaged in L UE AAROM with assistance to maintain end range of motion, initially with stiffness that improved with repetition. Patient completed WB while seated EOB into L UE with excellent control for neuro rehab. Patient with excellent understanding of technique and in agreement to complete throughout the day. Educated on benefits of OOB activity, continued compliance as able to NWB to RLE for wound healing, and to call for A to transfer back to bed. Stated understanding and in agreement with POC.    Patient left up in chair with all lines intact, call button in reach, and chair alarm on    GOALS:   Multidisciplinary Problems       Occupational Therapy Goals          Problem: Occupational Therapy    Goal Priority Disciplines Outcome Interventions   Occupational Therapy Goal     OT, PT/OT Ongoing, Progressing    Description: Goals to be met by: 9/30/22     Patient will increase functional independence with ADLs by performing:    LE Dressing with Set-up Assistance to primo shoe and AFO.  Stand pivot transfers with Modified [place].  Upper extremity exercise program x20 reps per handout, with assistance as needed.                         Time Tracking:     OT Date of Treatment: 09/23/22  OT Start Time: 0810  OT Stop Time: 0835  OT Total Time (min): 25 min    Billable Minutes:Therapeutic Activity 10  Therapeutic Exercise 15    9/23/2022

## 2022-09-23 NOTE — PLAN OF CARE
[facility] - Telemetry (Hospital)  Discharge Final Note    Primary Care Provider: No Maher MD    Expected Discharge Date: 9/23/2022    Final Discharge Note (most recent)       Final Note - 09/23/22 1158          Final Note    Assessment Type Final Discharge Note     Anticipated Discharge Disposition Skilled Nursing Facility        Post-Acute Status    Post-Acute Authorization Placement     Post-Acute Placement Status Set-up Complete/Auth obtained   [facility] [place]    Discharge Delays None known at this time                     Important Message from Medicare  Important Message from Medicare regarding Discharge Appeal Rights: Given to patient/caregiver, Explained to patient/caregiver, Signed/date by patient/caregiver     Date IMM was signed: 09/22/22  Time IMM was signed: 1030    Contact Info       No Maher MD   Specialty: Internal Medicine   Relationship: PCP - General    [address]  [address]  [facility] [address]   Phone: [phone]       Next Steps: Schedule an appointment as soon as possible for a visit    Instructions: After discharge from SNF

## 2022-09-24 NOTE — DISCHARGE SUMMARY
[facility] - Telemetry ([facility])  [facility] Medicine  Discharge Summary      Patient Name: Henry Fernandez  MRN: [id number]  Patient Class: IP- Inpatient  Admission Date: 9/14/2022  Hospital Length of Stay: 9 days  Discharge Date and Time: 9/23/2022 12:59 PM  Attending Physician: No att. providers found   Discharging Provider: Wyatt العراقي MD  Primary Care Provider: No Maher MD      HPI:   Mr Fernandez is a 52 year old male with PMHx of HTN, Stroke, DM, HLD and gastroparesis and cholecystectomy who presents to [facility] from [facility] for further treatment of Right diabetic foot wound. He ws transferred for Podiatry and Infectious Disease services. Denies associated symptoms of chest pain, shortness of breath, fever or chills. According to [facility]  MD, Blood cultures at previous hospital positive for Staph schleiferi. He was being treated with Rocephin and Vancomycin.  MRI of the RLE showed an 8.4cm dorsal fluid collection, with a possibilty of osteo. Patient is a full code, sighficant other and mother are surrogate Decision maker. Patient is being admitted under the care of [facility] Medicine,.       Procedure(s) (LRB):  INCISION AND DRAINAGE, FOOT (Right)  DEBRIDEMENT, FOOT (Right)  BIOPSY, BONE (Right)      Hospital Course:   Patient is a 52 year old male who presented to [facility] from [facility] for Podiatry and Infectious Disease Services. Podiatry following, patient under I & D at bedside last night. He was taken for surgery today and underwent I & D, Debridement and bone biopsy. Post procedure, patient doing well, vital signs stable. Will continue IV antibiotics, possible wound vac in 1-2 days.     9/16 He denies any complaint for today . He is s/p I&D of Right foot Diabetic wound and abscess  per Podiatrist . The WBC is trending up and IVAB change fron rocephin to Cefepiem and metronidazol ID consulted .     9/17 he is complaining of nause /vomiting . The wound cx  are (+) for staph . The kidney fucntion is improving . Cont IVAB  and wound care     9/18 Wond cx (+) for staph . MEGHA . CM consulted for d/c planning . Will need 6 week IVAB .     9/19  ID consulted . MALISSAON . Plan  for a  PICC line today  . The Blood  Cx are NGTD . No new complaint .     9/20 Wound vac in place . PT/OT rec SNF . Wound cx (+) for MSSA.  NAEON .     9/21 Wound vac placed yesterday . PT/OT rec SNF placement .  NAJYOTION . CM consulted for placement.    9/22 NAEON . Aawaiting for placememnt . Wound vac in place . ID douglas noted .     9/23- Pt accepted to  [facility] SNF. Wound care suggested wet to dry dressing on wound and [facility] will place their wound vac and dressing upon arrival. ID suggested 6 weeks antibiotic treatment with Ancef 2 gram IV q8h and oral Flagyl 500 mg po tid with EOC 10/26/2022. Pt is examined and deemed stable for discharge to SNF.           Goals of Care Treatment Preferences:  Code Status: Full Code      Consults:   Consults (From admission, onward)        Status Ordering Provider     Inpatient consult to Social Work  Once        Provider:  (Not yet assigned)    Completed CHUCK MUELLER     Inpatient consult to Podiatry  Once        Provider:  Tatyana Siddiqui DPM    Completed WILFRIDO HERNÁNDEZ          No new Assessment & Plan notes have been filed under this hospital service since the last note was generated.  Service: Hospital Medicine    Final Active Diagnoses:    Diagnosis Date Noted POA    PRINCIPAL PROBLEM:  Diabetic ulcer of right foot with abscess and medial cuneiform osteomyelitis  [E11.621, L97.519] 09/14/2022 Yes    CKD (chronic kidney disease) stage 3, GFR 30-59 ml/min [N18.30] 09/15/2022 Yes    History of stroke [Z86.73] 09/14/2022 Not Applicable    Bacteremia [R78.81] 09/14/2022 Yes    Abscess of foot without toes, right [L02.611] 09/14/2022 Yes    Charcot's joint of foot, right [M14.671] 09/14/2022 Yes    Diabetes type 2,  uncontrolled [E11.65]  Yes    Diabetic gastroparesis [E11.43, K31.84]  Yes    Hypertension [I10] 12/26/2014 Yes    Diabetes mellitus [E11.9] 12/26/2014 Yes      Problems Resolved During this Admission:       Discharged Condition: stable    Disposition: Skilled Nursing Facility    Follow Up:   Follow-up Information     No Maher MD. Schedule an appointment as soon as possible for a visit.    Specialty: Internal Medicine  Why: After discharge from SNF  Contact information:  [address]  [address]  [facility]  [place] [address] [phone]                       Patient Instructions:      Diet diabetic     Change dressing (specify)   Order Comments: Wound vac change M/W/F     Activity as tolerated       Significant Diagnostic Studies: Labs:   BMP:   Recent Labs   Lab 09/22/22  0448   *      K 3.7   CL 99   CO2 28   BUN 13   CREATININE 1.6*   CALCIUM 7.9*   , CMP   Recent Labs   Lab 09/22/22  0448      K 3.7   CL 99   CO2 28   *   BUN 13   CREATININE 1.6*   CALCIUM 7.9*   PROT 6.4   ALBUMIN 2.2*   BILITOT 0.4   ALKPHOS 84   AST 39   ALT 24   ANIONGAP 10    and CBC   Recent Labs   Lab 09/22/22  0448   WBC 13.99*   HGB 9.9*   HCT 29.7*        Microbiology:   Blood Culture   Lab Results   Component Value Date    LABBLOO No growth after 5 days. 09/14/2022    and Wound Culture: pending    Pending Diagnostic Studies:     None         Medications:  Reconciled Home Medications:      Medication List      START taking these medications    ceFAZolin 2 g/50mL Dextrose IVPB 2 gram/50 mL Pgbk  Commonly known as: ANCEF  Inject 50 mLs (2,000 mg total) into the vein every 8 (eight) hours.     insulin aspart U-100 100 unit/mL (3 mL) Inpn pen  Commonly known as: NovoLOG  Inject 1-10 Units into the skin before meals and at bedtime as needed (Hyperglycemia).  Replaces: insulin aspart U-100 100 unit/mL injection     insulin detemir U-100 100 unit/mL (3 mL) Inpn pen  Commonly known as:  Levemir FLEXTOUCH  Inject 30 Units into the skin every evening.  Replaces: insulin glargine 100 units/mL SubQ pen     metroNIDAZOLE 500 MG tablet  Commonly known as: FLAGYL  Take 1 tablet (500 mg total) by mouth every 8 (eight) hours.     polyethylene glycol 17 gram Pwpk  Commonly known as: GLYCOLAX  Take 17 g by mouth daily as needed.        CHANGE how you take these medications    promethazine 25 MG suppository  Commonly known as: PHENERGAN  Place 1 suppository (25 mg total) rectally every 6 (six) hours as needed for Nausea.  What changed: Another medication with the same name was removed. Continue taking this medication, and follow the directions you see here.        CONTINUE taking these medications    amLODIPine 10 MG tablet  Commonly known as: NORVASC  Take 10 mg by mouth once daily.     atorvastatin 20 MG tablet  Commonly known as: LIPITOR  atorvastatin 20 mg tablet     clopidogreL 75 mg tablet  Commonly known as: PLAVIX  clopidogrel 75 mg tablet     ondansetron 4 MG Tbdl  Commonly known as: ZOFRAN-ODT  Take 1 tablet (4 mg total) by mouth every 8 (eight) hours as needed.        STOP taking these medications    ADMELOG U-100 INSULIN LISPRO 100 unit/mL injection  Generic drug: insulin lispro     insulin aspart U-100 100 unit/mL injection  Commonly known as: NovoLOG  Replaced by: insulin aspart U-100 100 unit/mL (3 mL) Inpn pen     insulin glargine 100 units/mL SubQ pen  Commonly known as: LANTUS SOLOSTAR U-100 INSULIN  Replaced by: insulin detemir U-100 100 unit/mL (3 mL) Inpn pen     lisinopriL 20 MG tablet  Commonly known as: PRINIVIL,ZESTRIL     metoclopramide HCl 10 MG tablet  Commonly known as: REGLAN     ondansetron 4 MG tablet  Commonly known as: ZOFRAN            Indwelling Lines/Drains at time of discharge:   Lines/Drains/Airways     Peripherally Inserted Central Catheter Line  Duration           PICC Double Lumen 09/19/22 1533 right basilic 4 days                Time spent on the discharge of patient:  30  minutes         Wyatt العراقي MD  Department of Hospital Medicine  '[place] - Telemetry ([facility])

## 2024-01-04 NOTE — NURSING
I received a call from labs, saying that the wound culture was thrown out because it needed a requisition form. I did not recollect wound cultures because wound had packing put in place by podiatry.   Single liveborn, born in hospital, delivered by  section

## 2025-01-08 ENCOUNTER — HOSPITAL ENCOUNTER (OUTPATIENT)
Dept: TELEMEDICINE | Facility: HOSPITAL | Age: 55
Discharge: HOME | End: 2025-01-08
Payer: MEDICARE

## 2025-01-08 DIAGNOSIS — R53.1 LEFT-SIDED WEAKNESS: Primary | ICD-10-CM

## 2025-01-08 PROCEDURE — G0425 INPT/ED TELECONSULT30: HCPCS | Mod: GT,,, | Performed by: PSYCHIATRY & NEUROLOGY

## 2025-01-08 NOTE — SUBJECTIVE & OBJECTIVE
Anticipated Discharge Disposition: SNF    Action: LSW spoke to Ruth with [facility] 12/22 and they cannot accommodate pt's wound vacs at this time. LSW informed that pt's #1 SNF CHOICE was [facility].     Epic showing that pt accepted by [facility].   LSW informed by Jt CASTANO that [facility] anticipates to have a bed available in two weeks.     [facility]- still showing declined and requested two negative tests. LSW to request for [facility] to ensure referral was received.     LSW called pt's bedside phone to provide update. Pt aware that [facility] cannot accommodate wound vacs and that [facility] anticipates a bed to be available in two weeks. Pt stated he would be okay to move forward with [facility] if he is cleared and if they have a bed available for him.     Barriers to Discharge: None     Plan: LSW to f/u with medical clearance, LSW to f/u with SNF bed availability, LSW to assist as needed     Addendum 2655  LSW messaged Dr. Boucher via Voalte to f/u with medical clearance.     Addendum 1008  LSW received update from Dr. Boucher and pt medically cleared.   LSW requested for Jt CASTANO to f/u with bed availability at [facility].     Addendum 1012  LSW informed by Jt CASTANO that [facility] on admission hold and anticipates to be off hold on 12/30.    HPI:  54 y.o. male with HTN, HLD, DM, stroke with residual L sided weakness, brought in due to increasing L sided weakness, slurred speech, and excessive drowsiness.  He is wheelchair bound at baseline.     Images personally reviewed and interpreted:  Study: Head CT  Study Interpretation: no acute abnormality     Assessment and plan:  New R brain infarct vs stroke recrudescence.  Recommended admission, MRI brain, CTA brain and neck, serologies, UA.  Neurology, PT/OT and speech therapy consults.    Lytics recommendation: Thrombolytic therapy not recommended due to poor functional state at baseline    Thrombectomy recommendation: Awaiting [place] [facility] [place]   Placement recommendation: admit to inpatient

## 2025-01-08 NOTE — TELEMEDICINE CONSULT
[facility]  Vascular Neurology  Comprehensive Stroke Center  TeleVascular Neurology Acute Consultation Note        Consult Information  Consults    Consulting Provider: TANA TAVERA   Current Providers  No providers found    Patient Location: [facility] - TELEMEDICINE ED [facility] PATIENT FLOW CENTER Emergency Department    Spoke hospital nurse at bedside with patient assisting consultant.  Patient information was obtained from past medical records and primary team.       Stroke Documentation  Acute Stroke Times   Last Known Normal Date: 01/08/25  Last Known Normal Time: 0940  Symptom Onset Date: 01/08/25  Symptom Onset Time: 0940  Stroke Team Called Date: 01/08/25  Stroke Team Called Time: 1159  Stroke Team Arrival Date: 01/08/25  Stroke Team Arrival Time: 1205  CT Interpretation Time: 1205  Thrombolytic Therapy Recommended: No  Thrombectomy Recommended: No    NIH Scale:  Interval: baseline  1a. Level of Consciousness: 0-->Alert, keenly responsive  1b. LOC Questions: 0-->Answers both questions correctly  1c. LOC Commands: 0-->Performs both tasks correctly  2. Best Gaze: 0-->Normal  3. Visual: 0-->No visual loss  4. Facial Palsy: 1-->Minor paralysis (flattened nasolabial fold, asymmetry on smiling)  5a. Motor Arm, Left: 3-->No effort against gravity, limb falls  5b. Motor Arm, Right: 0-->No drift, limb holds 90 (or 45) degrees for full 10 secs  6a. Motor Leg, Left: 3-->No effort against gravity, leg falls to bed immediately  6b. Motor Leg, Right: 0-->No drift, leg holds 30 degree position for full 5 secs  7. Limb Ataxia: 0-->Absent  8. Sensory: 0-->Normal, no sensory loss  9. Best Language: 0-->No aphasia, normal  10. Dysarthria: 1-->Mild-to-moderate dysarthria, patient slurs at least some words and, at worst, can be understood with some difficulty  11. Extinction and Inattention (formerly Neglect): 0-->No abnormality  Total (NIH Stroke Scale): 8      Modified [place]: Score:  4  Nalini Coma Scale: 15   ABCD2 Score:    [id number] Score:    HAS -BLED Score:    ICH Score:    Hunt & Eaton Classification:      There were no vitals taken for this visit.      In my opinion, this was a: Tier 1; VAN Stroke Assessment: Negative     Medical Decision Making  HPI:  54 y.o. male with HTN, HLD, DM, stroke with residual L sided weakness, brought in due to increasing L sided weakness, slurred speech, and excessive drowsiness.  He is wheelchair bound at baseline.     Images personally reviewed and interpreted:  Study: Head CT  Study Interpretation: no acute abnormality     Assessment and plan:  New R brain infarct vs stroke recrudescence.  Recommended admission, MRI brain, CTA brain and neck, serologies, UA.  Neurology, PT/OT and speech therapy consults.    Lytics recommendation: Thrombolytic therapy not recommended due to poor functional state at baseline    Thrombectomy recommendation: Awaiting CTA results from [facility] for determination   Placement recommendation: admit to inpatient               ROS  Physical Exam  Past Medical History:   Diagnosis Date    Diabetes mellitus     Gastroparesis     Hypertension     Stroke      Past Surgical History:   Procedure Laterality Date    BONE BIOPSY Right 9/15/2022    Procedure: BIOPSY, BONE;  Surgeon: Tatyana Siddiqui DPM;  Location: [facility] [place];  Service: Podiatry;  Laterality: Right;    CHOLECYSTECTOMY      DEBRIDEMENT OF FOOT Right 9/15/2022    Procedure: DEBRIDEMENT, FOOT;  Surgeon: Tatyana Siddiqui DPM;  Location: [facility];  Service: Podiatry;  Laterality: Right;    INCISION AND DRAINAGE FOOT Right 9/15/2022    Procedure: INCISION AND DRAINAGE, FOOT;  Surgeon: Tatyana Siddiqui DPM;  Location: [facility] OR;  Service: Podiatry;  Laterality: Right;     Family History   Problem Relation Name Age of Onset    No Known Problems Mother      No Known Problems Father         Diagnoses  Problem Noted   Left-Sided Weakness 1/8/2025       Dario Jordan,  MD      Emergent/Acute neurological consultation requested by spoke provider due to critical concerns for possible cerebrovascular event that could result in permanent loss of neurologic/bodily function, severe disability or death of this patient.  Immediate/timely evaluation by a highly prepared expert is paramount for optimal outcomes  High risk for neurological deterioration if not properly diagnosed  High risk for neurological deterioration if not treated promplty/as soon as possible  Complex diagnostic evaluation may be required (advanced imaging)  High risk treatment options (thrombolytics and/or thrombectomy)    Patient care was coordinated with spoke provider, including but not limted to    Discussing likely diagnosis/etiology of symptoms  Making recommendations for further diagnostic studies  Making recommendations for intravenous thrombolytics or other advanced therapies  Making recommendations for disposition (admission/transfer for higher level of care)      Neurology consultation requested by spoke provider. Audiovisual encounter with the patient performed using a secure connection.  Results and impressions from the visit are documented on this note and were communicated to the consulting provider/team via direct communication. The note has been shared for addition to the patients electronic medical record.

## 2025-03-11 ENCOUNTER — HOSPITAL ENCOUNTER (OUTPATIENT)
Facility: HOSPITAL | Age: 55
End: 2025-03-14
Attending: STUDENT IN AN ORGANIZED HEALTH CARE EDUCATION/TRAINING PROGRAM | Admitting: FAMILY MEDICINE
Payer: MEDICARE

## 2025-03-11 DIAGNOSIS — Z01.818 PRE-OPERATIVE CLEARANCE: ICD-10-CM

## 2025-03-11 DIAGNOSIS — I69.391 DYSPHAGIA DUE TO RECENT STROKE: Primary | ICD-10-CM

## 2025-03-11 LAB
ALBUMIN SERPL BCP-MCNC: 2.5 G/DL (ref 3.5–5.2)
ALP SERPL-CCNC: 98 U/L (ref 40–150)
ALT SERPL W/O P-5'-P-CCNC: 14 U/L (ref 10–44)
ANION GAP SERPL CALC-SCNC: 10 MMOL/L (ref 8–16)
AST SERPL-CCNC: 14 U/L (ref 10–40)
BASOPHILS # BLD AUTO: 0.05 K/UL (ref 0–0.2)
BASOPHILS NFR BLD: 0.4 % (ref 0–1.9)
BILIRUB SERPL-MCNC: 0.5 MG/DL (ref 0.1–1)
BUN SERPL-MCNC: 30 MG/DL (ref 6–20)
CALCIUM SERPL-MCNC: 8.7 MG/DL (ref 8.7–10.5)
CHLORIDE SERPL-SCNC: 107 MMOL/L (ref 95–110)
CO2 SERPL-SCNC: 23 MMOL/L (ref 23–29)
CREAT SERPL-MCNC: 1.7 MG/DL (ref 0.5–1.4)
DIFFERENTIAL METHOD BLD: ABNORMAL
EOSINOPHIL # BLD AUTO: 0.1 K/UL (ref 0–0.5)
EOSINOPHIL NFR BLD: 0.5 % (ref 0–8)
ERYTHROCYTE [DISTWIDTH] IN BLOOD BY AUTOMATED COUNT: 13.7 % (ref 11.5–14.5)
EST. GFR  (NO RACE VARIABLE): 47.6 ML/MIN/1.73 M^2
GLUCOSE SERPL-MCNC: 137 MG/DL (ref 70–110)
HCT VFR BLD AUTO: 32.8 % (ref 40–54)
HCV AB SERPL QL IA: NORMAL
HGB BLD-MCNC: 10.3 G/DL (ref 14–18)
HIV 1+2 AB+HIV1 P24 AG SERPL QL IA: NORMAL
IMM GRANULOCYTES # BLD AUTO: 0.07 K/UL (ref 0–0.04)
IMM GRANULOCYTES NFR BLD AUTO: 0.6 % (ref 0–0.5)
LYMPHOCYTES # BLD AUTO: 1.8 K/UL (ref 1–4.8)
LYMPHOCYTES NFR BLD: 15.6 % (ref 18–48)
MCH RBC QN AUTO: 29.4 PG (ref 27–31)
MCHC RBC AUTO-ENTMCNC: 31.4 G/DL (ref 32–36)
MCV RBC AUTO: 94 FL (ref 82–98)
MONOCYTES # BLD AUTO: 1.2 K/UL (ref 0.3–1)
MONOCYTES NFR BLD: 10.7 % (ref 4–15)
NEUTROPHILS # BLD AUTO: 8.3 K/UL (ref 1.8–7.7)
NEUTROPHILS NFR BLD: 72.2 % (ref 38–73)
NRBC BLD-RTO: 0 /100 WBC
PLATELET # BLD AUTO: 296 K/UL (ref 150–450)
PMV BLD AUTO: 10 FL (ref 9.2–12.9)
POTASSIUM SERPL-SCNC: 4.5 MMOL/L (ref 3.5–5.1)
PROT SERPL-MCNC: 7.1 G/DL (ref 6–8.4)
RBC # BLD AUTO: 3.5 M/UL (ref 4.6–6.2)
SODIUM SERPL-SCNC: 140 MMOL/L (ref 136–145)
WBC # BLD AUTO: 11.51 K/UL (ref 3.9–12.7)

## 2025-03-11 PROCEDURE — 25000003 PHARM REV CODE 250: Performed by: PHYSICIAN ASSISTANT

## 2025-03-11 PROCEDURE — 99285 EMERGENCY DEPT VISIT HI MDM: CPT

## 2025-03-11 PROCEDURE — 80053 COMPREHEN METABOLIC PANEL: CPT | Performed by: EMERGENCY MEDICINE

## 2025-03-11 PROCEDURE — 85025 COMPLETE CBC W/AUTO DIFF WBC: CPT | Performed by: EMERGENCY MEDICINE

## 2025-03-11 PROCEDURE — 87389 HIV-1 AG W/HIV-1&-2 AB AG IA: CPT | Performed by: PHYSICIAN ASSISTANT

## 2025-03-11 PROCEDURE — G0378 HOSPITAL OBSERVATION PER HR: HCPCS

## 2025-03-11 PROCEDURE — 96361 HYDRATE IV INFUSION ADD-ON: CPT

## 2025-03-11 PROCEDURE — 86803 HEPATITIS C AB TEST: CPT | Performed by: PHYSICIAN ASSISTANT

## 2025-03-11 PROCEDURE — 83036 HEMOGLOBIN GLYCOSYLATED A1C: CPT | Performed by: PHYSICIAN ASSISTANT

## 2025-03-11 RX ORDER — SODIUM CHLORIDE 0.9 % (FLUSH) 0.9 %
10 SYRINGE (ML) INJECTION
Status: DISCONTINUED | OUTPATIENT
Start: 2025-03-11 | End: 2025-03-14 | Stop reason: HOSPADM

## 2025-03-11 RX ORDER — GLUCAGON 1 MG
1 KIT INJECTION
Status: DISCONTINUED | OUTPATIENT
Start: 2025-03-11 | End: 2025-03-14 | Stop reason: HOSPADM

## 2025-03-11 RX ORDER — TALC
6 POWDER (GRAM) TOPICAL NIGHTLY PRN
Status: DISCONTINUED | OUTPATIENT
Start: 2025-03-11 | End: 2025-03-14 | Stop reason: HOSPADM

## 2025-03-11 RX ORDER — ONDANSETRON HYDROCHLORIDE 2 MG/ML
4 INJECTION, SOLUTION INTRAVENOUS EVERY 8 HOURS PRN
Status: DISCONTINUED | OUTPATIENT
Start: 2025-03-11 | End: 2025-03-14 | Stop reason: HOSPADM

## 2025-03-11 RX ORDER — ACETAMINOPHEN 650 MG/1
650 SUPPOSITORY RECTAL EVERY 8 HOURS PRN
Status: DISCONTINUED | OUTPATIENT
Start: 2025-03-11 | End: 2025-03-14 | Stop reason: HOSPADM

## 2025-03-11 RX ORDER — INSULIN ASPART 100 [IU]/ML
0-5 INJECTION, SOLUTION INTRAVENOUS; SUBCUTANEOUS EVERY 6 HOURS PRN
Status: DISCONTINUED | OUTPATIENT
Start: 2025-03-11 | End: 2025-03-14 | Stop reason: HOSPADM

## 2025-03-11 RX ORDER — SODIUM CHLORIDE 9 MG/ML
1000 INJECTION, SOLUTION INTRAVENOUS CONTINUOUS
Status: ACTIVE | OUTPATIENT
Start: 2025-03-11 | End: 2025-03-12

## 2025-03-11 RX ADMIN — SODIUM CHLORIDE 1000 ML: 9 INJECTION, SOLUTION INTRAVENOUS at 07:03

## 2025-03-11 NOTE — Clinical Note
Diagnosis: Dysphagia due to recent stroke [181553]   Future Attending Provider: AMRITA MARTINEZ [[id number]]   Is the patient being sent to ED Observation?: Yes

## 2025-03-11 NOTE — ED PROVIDER NOTES
Encounter Date: 3/11/2025       History     Chief Complaint   Patient presents with    Dysphagia     Coming from [place] [facility] in [place]. Hx stroke Left deficits right weakness. Failed barium swallow study today, needs eval for peg tube      The patient is a 54 year old male. He has a documented past medical history of HTN, HLD, DM, prior stroke with chronic residual left sided weakness and wheelchair dependence. He was seen at outside facility on 1/8/25 for an acute CVA per chart review. Today he underwent an outpatient barium swallowing study and was subsequently sent to the ER for PEG tube evaluation due to abnormal result. The patient has no acute physical complaints at this time. He does report some difficulty swallowing, but that he has been eating/drinking and denies any episodes of choking or aspiration. He states that he is hesitant about getting a feeding tube, prefers any alternative options, but will consider recommendations.         Review of patient's allergies indicates:  No Known Allergies  Past Medical History:   Diagnosis Date    Diabetes mellitus     Gastroparesis     Hypertension     Stroke      Past Surgical History:   Procedure Laterality Date    BONE BIOPSY Right 9/15/2022    Procedure: BIOPSY, BONE;  Surgeon: Tatyana Siddiqui DPM;  Location: [facility] OR;  Service: Podiatry;  Laterality: Right;    CHOLECYSTECTOMY      DEBRIDEMENT OF FOOT Right 9/15/2022    Procedure: DEBRIDEMENT, FOOT;  Surgeon: Tatyana Siddiqui DPM;  Location: [facility] OR;  Service: Podiatry;  Laterality: Right;    INCISION AND DRAINAGE FOOT Right 9/15/2022    Procedure: INCISION AND DRAINAGE, FOOT;  Surgeon: Tatyana Siddiqui DPM;  Location: [facility] OR;  Service: Podiatry;  Laterality: Right;     Family History   Problem Relation Name Age of Onset    No Known Problems Mother      No Known Problems Father       Social History[1]  Review of Systems   Constitutional:  Negative for fever.   HENT:  Positive for trouble swallowing.  Negative for drooling.    Respiratory:  Negative for cough, choking and shortness of breath.    Cardiovascular:  Negative for chest pain.   Gastrointestinal:  Negative for vomiting.   Musculoskeletal:  Negative for neck pain.   Allergic/Immunologic: Negative for immunocompromised state.   Neurological:  Negative for dizziness, seizures, syncope and headaches.   Psychiatric/Behavioral:  Negative for confusion.        Physical Exam     Initial Vitals [03/11/25 1418]   BP Pulse Resp Temp SpO2   101/72 92 17 98 °F (36.7 °C) 98 %      MAP       --         Physical Exam    Nursing note and vitals reviewed.  Constitutional: He is not diaphoretic.   Alert and interactive. No obvious distress. Unaccompanied.    HENT:   Head: Normocephalic.   Eyes: Conjunctivae are normal.   Cardiovascular:  Normal rate.           Pulmonary/Chest: No respiratory distress.   Abdominal: He exhibits no distension. There is no abdominal tenderness.   Musculoskeletal:         General: Normal range of motion.     Neurological: He is alert.   Left hemiparesis. Oriented appropriately.    Skin: Skin is warm and dry.   Psychiatric: He has a normal mood and affect. His behavior is normal.         ED Course   Procedures  Labs Reviewed   CBC W/ AUTO DIFFERENTIAL - Abnormal       Result Value    WBC 11.51      RBC 3.50 (*)     Hemoglobin 10.3 (*)     Hematocrit 32.8 (*)     MCV 94      MCH 29.4      MCHC 31.4 (*)     RDW 13.7      Platelets 296      MPV 10.0      Immature Granulocytes 0.6 (*)     Gran # (ANC) 8.3 (*)     Immature Grans (Abs) 0.07 (*)     Lymph # 1.8      Mono # 1.2 (*)     Eos # 0.1      Baso # 0.05      nRBC 0      Gran % 72.2      Lymph % 15.6 (*)     Mono % 10.7      Eosinophil % 0.5      Basophil % 0.4      Differential Method Automated      Narrative:     Release to patient->Immediate   COMPREHENSIVE METABOLIC PANEL - Abnormal    Sodium 140      Potassium 4.5      Chloride 107      CO2 23      Glucose 137 (*)     BUN 30 (*)      Creatinine 1.7 (*)     Calcium 8.7      Total Protein 7.1      Albumin 2.5 (*)     Total Bilirubin 0.5      Alkaline Phosphatase 98      AST 14      ALT 14      eGFR 47.6 (*)     Anion Gap 10      Narrative:     Release to patient->Immediate   HEPATITIS C ANTIBODY    Hepatitis C Ab Non-reactive      Narrative:     Release to patient->Immediate   HIV 1 / 2 ANTIBODY    HIV 1/2 Ag/Ab Non-reactive      Narrative:     Release to patient->Immediate   HEMOGLOBIN A1C   POCT GLUCOSE MONITORING CONTINUOUS          Imaging Results    None          Medications   0.9% NaCl infusion (1,000 mLs Intravenous New Bag 3/11/25 1954)   dextrose 50% injection 12.5 g (has no administration in time range)   glucagon (human recombinant) injection 1 mg (has no administration in time range)   insulin aspart U-100 pen 0-5 Units (has no administration in time range)     Medical Decision Making  Amount and/or Complexity of Data Reviewed  Labs: ordered.    Risk  Prescription drug management.                          Medical Decision Making:   History:   Old Medical Records: I decided to obtain old medical records.  Old Records Summarized: records from another hospital.  Initial Assessment:   55 yo M, hx of recent CVA, sent to ED for PEG eval due to abnormal outpatient swallowing study. No acute complaints reported.   Differential Diagnosis:   CVA, dysphagia, aspiration/choking risk, etc   Clinical Tests:   Lab Tests: Ordered and Reviewed  ED Management:  Vital signs reviewed, benign   Chart review completed   Labs reviewed   Case discussed with ED attending physician, recommends admission for PEG eval   Case discussed with hospital medicine, recommends admission to EDOU   Case discussed with EDOU LEOBARDO, agreeable to admission   Other:   I have discussed this case with another health care provider.             Clinical Impression:  Final diagnoses:  [Z01.818] Pre-operative clearance  [I69.391] Dysphagia due to recent stroke (Primary)          ED  Disposition Condition    Observation Stable                    [1]   Social History  Tobacco Use    Smoking status: Never    Smokeless tobacco: Never   Substance Use Topics    Alcohol use: No    Drug use: No        Vel Friedman, GIBSON  03/11/25 2048

## 2025-03-11 NOTE — ED TRIAGE NOTES
Henry Fernandez, a 54 y.o. male presents to the ED w/ complaint of difficulty swallowing.     Triage note:  Chief Complaint   Patient presents with    Dysphagia     Coming from [facility] in [place]. Hx stroke Left deficits right weakness. Failed barium swallow study today, needs eval for peg tube      Review of patient's allergies indicates:  No Known Allergies  Past Medical History:   Diagnosis Date    Diabetes mellitus     Gastroparesis     Hypertension     Stroke

## 2025-03-12 LAB
ESTIMATED AVG GLUCOSE: 180 MG/DL (ref 68–131)
HBA1C MFR BLD: 7.9 % (ref 4–5.6)
POCT GLUCOSE: 105 MG/DL (ref 70–110)
POCT GLUCOSE: 120 MG/DL (ref 70–110)
POCT GLUCOSE: 125 MG/DL (ref 70–110)
POCT GLUCOSE: 132 MG/DL (ref 70–110)

## 2025-03-12 PROCEDURE — G0378 HOSPITAL OBSERVATION PER HR: HCPCS

## 2025-03-12 PROCEDURE — 92610 EVALUATE SWALLOWING FUNCTION: CPT

## 2025-03-12 PROCEDURE — 96374 THER/PROPH/DIAG INJ IV PUSH: CPT

## 2025-03-12 PROCEDURE — 82962 GLUCOSE BLOOD TEST: CPT

## 2025-03-12 PROCEDURE — 96376 TX/PRO/DX INJ SAME DRUG ADON: CPT

## 2025-03-12 PROCEDURE — 97535 SELF CARE MNGMENT TRAINING: CPT

## 2025-03-12 PROCEDURE — 96375 TX/PRO/DX INJ NEW DRUG ADDON: CPT

## 2025-03-12 PROCEDURE — 96361 HYDRATE IV INFUSION ADD-ON: CPT

## 2025-03-12 PROCEDURE — 99204 OFFICE O/P NEW MOD 45 MIN: CPT | Mod: GC,,, | Performed by: INTERNAL MEDICINE

## 2025-03-12 PROCEDURE — 63600175 PHARM REV CODE 636 W HCPCS

## 2025-03-12 PROCEDURE — 63600175 PHARM REV CODE 636 W HCPCS: Performed by: FAMILY MEDICINE

## 2025-03-12 RX ORDER — SODIUM CHLORIDE, SODIUM LACTATE, POTASSIUM CHLORIDE, CALCIUM CHLORIDE 600; 310; 30; 20 MG/100ML; MG/100ML; MG/100ML; MG/100ML
INJECTION, SOLUTION INTRAVENOUS CONTINUOUS
Status: ACTIVE | OUTPATIENT
Start: 2025-03-12 | End: 2025-03-13

## 2025-03-12 RX ORDER — METOCLOPRAMIDE HYDROCHLORIDE 5 MG/ML
5 INJECTION INTRAMUSCULAR; INTRAVENOUS EVERY 6 HOURS
Status: DISCONTINUED | OUTPATIENT
Start: 2025-03-12 | End: 2025-03-13

## 2025-03-12 RX ORDER — PANTOPRAZOLE SODIUM 40 MG/10ML
40 INJECTION, POWDER, LYOPHILIZED, FOR SOLUTION INTRAVENOUS DAILY
Status: DISCONTINUED | OUTPATIENT
Start: 2025-03-12 | End: 2025-03-14 | Stop reason: HOSPADM

## 2025-03-12 RX ADMIN — SODIUM CHLORIDE, POTASSIUM CHLORIDE, SODIUM LACTATE AND CALCIUM CHLORIDE: 600; 310; 30; 20 INJECTION, SOLUTION INTRAVENOUS at 03:03

## 2025-03-12 RX ADMIN — PANTOPRAZOLE SODIUM 40 MG: 40 INJECTION, POWDER, LYOPHILIZED, FOR SOLUTION INTRAVENOUS at 08:03

## 2025-03-12 RX ADMIN — METOCLOPRAMIDE 5 MG: 5 INJECTION, SOLUTION INTRAMUSCULAR; INTRAVENOUS at 05:03

## 2025-03-12 RX ADMIN — METOCLOPRAMIDE 5 MG: 5 INJECTION, SOLUTION INTRAMUSCULAR; INTRAVENOUS at 12:03

## 2025-03-12 NOTE — HPI
Patient is a 53 yo male with PMHx of T2DM, gastroparesis with intermittent vomiting, HTN, HLD, CKD 3, 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 Patient [facility] 3[facility]11 for PEG tube evaluation. Per chart review patient underwent an outpatient barium swallow 3/11 (results not in chart) and was subsequently sent to ED for PEG tube. In ED patient stated that he has been eating and drinking. At that time he endorsed difficulty swallowing but denies episodes of choking, nausea/vomiting, abdominal pain. Patient difficult to redirect in interview today. Unclear if patient has medical decision making capacity. Patient has two adult daughters, DELICIA Fung ([phone]).     GI consulted for evaluation for PEG tube.

## 2025-03-12 NOTE — ASSESSMENT & PLAN NOTE
55 yo male with PMHx of T2DM, gastroparesis with intermittent vomiting, HTN, HLD, CKD 3, 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 Patient presents to [facility] ED 3/11 for PEG tube evaluation. Per chart review patient underwent an outpatient barium swallow 3/11 (results not in chart) and was subsequently sent to ED for PEG tube.    -SLP unable to rule out aspiration at the bedside with low-level trials accepted. Pending access to recently completed MBSS at outside facility, patient might benefit from repeat Modified Barium Swallow Study    -Per SLP note patient not wish to have alternative means nutrition/hydration at this time.  -Currently no physical evidence precludes PEG tube placement.  -Please reach out if patient/family elects to proceed with PEG placement following repeat MBS/receipt of outside results

## 2025-03-12 NOTE — SUBJECTIVE & OBJECTIVE
Interval History:  Seen and evaluated on general medical floor  No acute events overnight    Clinical status stable, unchanged  No new complaints  Wants to drink water; explained that this is not safe until we are able to obtain swallow study    Objective:     Vital Signs (Most Recent):  Temp: 97.8 °F (36.6 °C) (03/12/25 1159)  Pulse: 85 (03/12/25 1159)  Resp: 18 (03/12/25 1159)  BP: 116/74 (03/12/25 1159)  SpO2: (!) 93 % (03/12/25 1159) Vital Signs (24h Range):  Temp:  [97.8 °F (36.6 °C)-98.8 °F (37.1 °C)] 97.8 °F (36.6 °C)  Pulse:  [65-92] 85  Resp:  [16-18] 18  SpO2:  [93 %-99 %] 93 %  BP: (101-126)/(66-76) 116/74     Weight: 76 kg (167 lb 8.8 oz)  Body mass index is 24.74 kg/m².    Intake/Output Summary (Last 24 hours) at 3/12/2025 1356  Last data filed at 3/12/2025 0559  Gross per 24 hour   Intake 700 ml   Output 600 ml   Net 100 ml         Physical Exam  Vitals reviewed.   Constitutional:       General: He is not in acute distress.     Appearance: He is ill-appearing. He is not toxic-appearing.   HENT:      Mouth/Throat:      Mouth: Mucous membranes are moist.   Cardiovascular:      Rate and Rhythm: Normal rate and regular rhythm.   Pulmonary:      Effort: Pulmonary effort is normal.      Breath sounds: Normal breath sounds.   Abdominal:      General: Bowel sounds are normal. There is no distension.      Palpations: Abdomen is soft.      Tenderness: There is no abdominal tenderness.   Genitourinary:     Comments: Astorga in place  Musculoskeletal:      Right lower leg: No edema.      Left lower leg: No edema.   Skin:     General: Skin is warm and dry.   Neurological:      Mental Status: He is alert.      Cranial Nerves: Cranial nerve deficit present.      Comments: Left-sided hemiparesis.  Left-sided facial weakness.  Impaired phonation; Speaks at a whisper.  Bed-bound.  Follows commands with right hand and foot.               Significant Labs: All pertinent labs within the past 24 hours have been  reviewed.    Significant Imaging: I have reviewed all pertinent imaging results/findings within the past 24 hours.

## 2025-03-12 NOTE — ASSESSMENT & PLAN NOTE
Multiple CVA  Urinary retention  NPO  Continue IV fluids  SLP consulted; they are recommending MBSS  Astorga in place, strict I/O  Resume Plavix when appropriate  GI consulted but pt stated to me and them that he does not want a PEG tube  Unable to reach family members at this time  Obtaining MBSS for further eval

## 2025-03-12 NOTE — PROGRESS NOTES
[facility] [address] [place] [address] Medicine  Progress Note    Patient Name: Henry Fernandez  MRN: [id number]  Patient Class: OP- Observation   Admission Date: 3/11/2025  Length of Stay: 0 days  Attending Physician: West Agustin DO  Primary Care Provider: No Maher MD        Subjective     Principal Problem:Dysphagia due to recent stroke        HPI:  53 yo M, PMH DM2, HTN, HLD, CKD 3 (Cr baseline approx 1.3-1.6), gastroparesis with intermittent vomiting, multiple CVA with indwelling Astorga, residual left hemiparesis, left-sided facial muscle weakness, and impaired vocal phonation who presents to the ED for PEG tube evaluation following failed barium swallow.  Pt is difficult to understand and it is unclear if he has capacity to make his own medical decisions. When asked why he needed the PEG tube, pt was not sure.  When asked about urination, pt said that he did not have a Astorga when there was one in place with leg bag.  He seems to answer yes or no questions appropriately.  Denies any headache, chest pain, SOB, abdominal pain, nausea or vomiting.  He does verbalize that he would like to discuss PEG tube with family.  Contacted patient's mother who says that following 3rd stroke, pt's voice became very weak and he speaks at almost a whisper.  Mother says pt is unable to move his left side and confirms left-sided facial weakness.  Pt does have 2 adult daughters who are not in contact with the patient's mother.  Mother says that daughter Antonieta is POA at [phone] (called and straight to ).  Mother says that she thinks the pt would want to have the PEG tube placed.     Called nursing facility who confirmed full code on file.  Confirmed baseline mentation and residual stroke deficits.    Overview/Hospital Course:  Reportedly admitted for PEG tube placement in setting of poor oral intake after CVA.  He apparently failed and outpatient barium swallow study.  Labs in the ED unremarkable.  Cr was 1.7 which is  seemingly around baseline.  We do not have access to the failed swallow study.  He says that he and his mother make his medical decisions.  I have not been able to reach his mother or his daughter.  Pt seems to have decision making capacity at this time.  He tells me adamantly that he does not want a PEG tube.  SLP evaluated and are unable at this time to assess whether or not he is showing signs of aspiration.  Obtaining MBSS.    Interval History:  Seen and evaluated on general medical floor  No acute events overnight    Clinical status stable, unchanged  No new complaints  Wants to drink water; explained that this is not safe until we are able to obtain swallow study    Objective:     Vital Signs (Most Recent):  Temp: 97.8 °F (36.6 °C) (03/12/25 1159)  Pulse: 85 (03/12/25 1159)  Resp: 18 (03/12/25 1159)  BP: 116/74 (03/12/25 1159)  SpO2: (!) 93 % (03/12/25 1159) Vital Signs (24h Range):  Temp:  [97.8 °F (36.6 °C)-98.8 °F (37.1 °C)] 97.8 °F (36.6 °C)  Pulse:  [65-92] 85  Resp:  [16-18] 18  SpO2:  [93 %-99 %] 93 %  BP: (101-126)/(66-76) 116/74     Weight: 76 kg (167 lb 8.8 oz)  Body mass index is 24.74 kg/m².    Intake/Output Summary (Last 24 hours) at 3/12/2025 1356  Last data filed at 3/12/2025 0559  Gross per 24 hour   Intake 700 ml   Output 600 ml   Net 100 ml         Physical Exam  Vitals reviewed.   Constitutional:       General: He is not in acute distress.     Appearance: He is ill-appearing. He is not toxic-appearing.   HENT:      Mouth/Throat:      Mouth: Mucous membranes are moist.   Cardiovascular:      Rate and Rhythm: Normal rate and regular rhythm.   Pulmonary:      Effort: Pulmonary effort is normal.      Breath sounds: Normal breath sounds.   Abdominal:      General: Bowel sounds are normal. There is no distension.      Palpations: Abdomen is soft.      Tenderness: There is no abdominal tenderness.   Genitourinary:     Comments: Astorga in place  Musculoskeletal:      Right lower leg: No edema.       Left lower leg: No edema.   Skin:     General: Skin is warm and dry.   Neurological:      Mental Status: He is alert.      Cranial Nerves: Cranial nerve deficit present.      Comments: Left-sided hemiparesis.  Left-sided facial weakness.  Impaired phonation; Speaks at a whisper.  Bed-bound.  Follows commands with right hand and foot.               Significant Labs: All pertinent labs within the past 24 hours have been reviewed.    Significant Imaging: I have reviewed all pertinent imaging results/findings within the past 24 hours.      Assessment & Plan  Dysphagia due to recent stroke  Multiple CVA  Urinary retention  NPO  Continue IV fluids  SLP consulted; they are recommending MBSS  Astorga in place, strict I/O  Resume Plavix when appropriate  GI consulted but pt stated to me and them that he does not want a PEG tube  Unable to reach family members at this time  Obtaining MBSS for further eval    Diabetic gastroparesis  On Reglan per nursing home facility  Continue Protonix  PO changed to IV    Diabetes mellitus, type 2  Last A1c 9.5 and 2022  Repeat A1c pending  NH says pt is on detemir 50 q.p.m..  Hold for now.  Continue sliding scale, titrate and add Lantus as needed    Hypertension  On Norvasc 10 q.p.m. is lisinopril 40 daily per NH facility.  Holding now with BP low normal.    VTE Risk Mitigation (From admission, onward)           Ordered     IP VTE HIGH RISK PATIENT  Once         03/11/25 2052     Place sequential compression device  Until discontinued         03/11/25 2052                    Discharge Planning   ZECHARIAH: 3/13/2025     Code Status: Full Code   Medical Readiness for Discharge Date:   Discharge Plan A: Return to nursing home                        West Agustin DO  Department of Hospital Medicine   [facility]

## 2025-03-12 NOTE — CONSULTS
[facility]  Gastroenterology  Consult Note    Patient Name: Henry Fernandez  MRN: [id number]  Admission Date: 3/11/2025  Hospital Length of Stay: 0 days  Code Status: Full Code   Attending Provider: West Agustin DO   Consulting Provider: Sobia Grove MD  Primary Care Physician: No Maher MD  Principal Problem:Dysphagia due to recent stroke    Inpatient consult to Gastroenterology  Consult performed by: Sobia Grove MD  Consult ordered by: Jemima Silva PA-C  Reason for consult: PEG        Subjective:     HPI:  Patient is a 53 yo male with PMHx of T2DM, gastroparesis with intermittent vomiting, HTN, HLD, CKD 3, 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 Patient presents to [facility] ED 3/11 for PEG tube evaluation. Per chart review patient underwent an outpatient barium swallow 3/11 (results not in chart) and was subsequently sent to ED for PEG tube. In ED patient stated that he has been eating and drinking. At that time he endorsed difficulty swallowing but denies episodes of choking, nausea/vomiting, abdominal pain. Patient difficult to redirect in interview today. Unclear if patient has medical decision making capacity. Patient has two adult daughters, DELICIA Fung ([phone]).     GI consulted for evaluation for PEG tube.    Past Medical History:   Diagnosis Date    CKD (chronic kidney disease)     Diabetes mellitus     Gastroparesis     Hypertension     Stroke     With left-sided weakness       Past Surgical History:   Procedure Laterality Date    BONE BIOPSY Right 9/15/2022    Procedure: BIOPSY, BONE;  Surgeon: Tatyana Siddiqui DPM;  Location: [facility] OR;  Service: Podiatry;  Laterality: Right;    CHOLECYSTECTOMY      DEBRIDEMENT OF FOOT Right 9/15/2022    Procedure: DEBRIDEMENT, FOOT;  Surgeon: Tatyana Siddiqui DPM;  Location: [facility] OR;  Service: Podiatry;  Laterality: Right;    INCISION AND DRAINAGE FOOT Right 9/15/2022    Procedure: INCISION AND  DRAINAGE, FOOT;  Surgeon: Tatyana Siddiqui DPM;  Location: [facility];  Service: Podiatry;  Laterality: Right;       Review of patient's allergies indicates:  No Known Allergies  Family History       Problem Relation (Age of Onset)    No Known Problems Mother, Father          Tobacco Use    Smoking status: Never    Smokeless tobacco: Never   Substance and Sexual Activity    Alcohol use: No    Drug use: No    Sexual activity: Yes     Partners: Female     Review of Systems   Respiratory:  Negative for shortness of breath.    Cardiovascular:  Negative for chest pain.   Gastrointestinal:  Negative for abdominal pain.     Objective:     Vital Signs (Most Recent):  Temp: 98.2 °F (36.8 °C) (03/12/25 0817)  Pulse: 75 (03/12/25 0817)  Resp: 18 (03/12/25 0817)  BP: 104/67 (03/12/25 0817)  SpO2: 98 % (03/12/25 0817) Vital Signs (24h Range):  Temp:  [97.8 °F (36.6 °C)-98.8 °F (37.1 °C)] 98.2 °F (36.8 °C)  Pulse:  [65-92] 75  Resp:  [16-18] 18  SpO2:  [95 %-99 %] 98 %  BP: (101-126)/(66-76) 104/67     Weight: 76 kg (167 lb 8.8 oz) (03/12/25 0215)  Body mass index is 24.74 kg/m².      Intake/Output Summary (Last 24 hours) at 3/12/2025 1036  Last data filed at 3/12/2025 0559  Gross per 24 hour   Intake 700 ml   Output 600 ml   Net 100 ml       Lines/Drains/Airways       Peripherally Inserted Central Catheter Line  Duration             PICC Double Lumen 09/19/22 1533 right basilic 904 days              Drain  Duration                  Urethral Catheter -- days              Peripheral Intravenous Line  Duration                  Peripheral IV - Single Lumen 03/11/25 1617 20 G Left Antecubital <1 day                     Physical Exam  Vitals and nursing note reviewed.   Constitutional:       General: He is not in acute distress.  HENT:      Head: Normocephalic and atraumatic.      Nose: Nose normal.   Cardiovascular:      Rate and Rhythm: Normal rate.   Pulmonary:      Effort: Pulmonary effort is normal. No respiratory distress.  "  Abdominal:      General: Abdomen is flat.      Palpations: Abdomen is soft. There is no mass.      Tenderness: There is no abdominal tenderness. There is no guarding or rebound.   Skin:     General: Skin is warm and dry.   Neurological:      Mental Status: He is disoriented.          Significant Labs:  CBC:   Recent Labs   Lab 03/11/25  1617   WBC 11.51   HGB 10.3*   HCT 32.8*        CMP:   Recent Labs   Lab 03/11/25  1617   *   CALCIUM 8.7   ALBUMIN 2.5*   PROT 7.1      K 4.5   CO2 23      BUN 30*   CREATININE 1.7*   ALKPHOS 98   ALT 14   AST 14   BILITOT 0.5     Coagulation: No results for input(s): "PT", "INR", "APTT" in the last 48 hours.    Significant Imaging:  Imaging results within the past 24 hours have been reviewed.  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 Patient presents to [facility] ED 3/11 for PEG tube evaluation. Per chart review patient underwent an outpatient barium swallow 3/11 (results not in chart) and was subsequently sent to ED for PEG tube.    -SLP unable to rule out aspiration at the bedside with low-level trials accepted. Pending access to recently completed MBSS at outside facility, patient might benefit from repeat Modified Barium Swallow Study    -Per SLP note patient not wish to have alternative means nutrition/hydration at this time.  -Currently no physical evidence precludes PEG tube placement.  -Please reach out if patient/family elects to proceed with PEG placement following repeat MBS/receipt of outside results           Thank you for your consult. I will sign off. Please contact us if you have any additional questions.    Sobia Grove MD  Gastroenterology  [facility] - Observation 11H  "

## 2025-03-12 NOTE — H&P
[facility] Dept  [facility] Medicine  History & Physical    Patient Name: Henry Fernandez  MRN: [id number]  Patient Class: OP- Observation  Admission Date: 3/11/2025  Attending Physician: West Agustin DO   Primary Care Provider: No Maher MD         Patient information was obtained from patient, relative(s), past medical records, and ER records.     Subjective:     Principal Problem:Dysphagia due to recent stroke    Chief Complaint   Patient presents with    Dysphagia     Coming from [facility] [place]. Hx stroke Left deficits right weakness. Failed barium swallow study today, needs eval for peg tube         HPI: 53 yo M, PMH DM2, HTN, HLD, CKD 3 (Cr baseline approx 1.3-1.6), gastroparesis with intermittent vomiting, multiple CVA with indwelling Astorga, residual left hemiparesis, left-sided facial muscle weakness, and impaired vocal phonation who presents to the ED for PEG tube evaluation following failed barium swallow.  Pt is difficult to understand and it is unclear if he has capacity to make his own medical decisions. When asked why he needed the PEG tube, pt was not sure.  When asked about urination, pt said that he did not have a Astorga when there was one in place with leg bag.  He seems to answer yes or no questions appropriately.  Denies any headache, chest pain, SOB, abdominal pain, nausea or vomiting.  He does verbalize that he would like to discuss PEG tube with family.  Contacted patient's mother who says that following 3rd stroke, pt's voice became very weak and he speaks at almost a whisper.  Mother says pt is unable to move his left side and confirms left-sided facial weakness.  Pt does have 2 adult daughters who are not in contact with the patient's mother.  Mother says that daughter Antonieta is POA at [phone] (called and straight to ).  Mother says that she thinks the pt would want to have the PEG tube placed.     Called nursing facility who confirmed full code on file.   Confirmed baseline mentation and residual stroke deficits.    Past Medical History:   Diagnosis Date    Diabetes mellitus     Gastroparesis     Hypertension     Stroke     With left-sided weakness       Past Surgical History:   Procedure Laterality Date    BONE BIOPSY Right 9/15/2022    Procedure: BIOPSY, BONE;  Surgeon: Tatyana Siddiqui DPM;  Location: [facility] OR;  Service: Podiatry;  Laterality: Right;    CHOLECYSTECTOMY      DEBRIDEMENT OF FOOT Right 9/15/2022    Procedure: DEBRIDEMENT, FOOT;  Surgeon: Tatyana Siddiqui DPM;  Location: [facility] OR;  Service: Podiatry;  Laterality: Right;    INCISION AND DRAINAGE FOOT Right 9/15/2022    Procedure: INCISION AND DRAINAGE, FOOT;  Surgeon: Tatyana Siddiqui DPM;  Location: [facility] OR;  Service: Podiatry;  Laterality: Right;       Review of patient's allergies indicates:  No Known Allergies    No current facility-administered medications on file prior to encounter.     Current Outpatient Medications on File Prior to Encounter   Medication Sig    amLODIPine (NORVASC) 10 MG tablet Take 10 mg by mouth once daily.    atorvastatin (LIPITOR) 20 MG tablet atorvastatin 20 mg tablet    clopidogreL (PLAVIX) 75 mg tablet clopidogrel 75 mg tablet    insulin aspart U-100 (NOVOLOG) 100 unit/mL (3 mL) InPn pen Inject 1-10 Units into the skin before meals and at bedtime as needed (Hyperglycemia).    insulin detemir U-100 (LEVEMIR FLEXTOUCH) 100 unit/mL (3 mL) SubQ InPn pen Inject 30 Units into the skin every evening.    ondansetron (ZOFRAN-ODT) 4 MG TbDL Take 1 tablet (4 mg total) by mouth every 8 (eight) hours as needed.    polyethylene glycol (GLYCOLAX) 17 gram PwPk Take 17 g by mouth daily as needed.    promethazine (PHENERGAN) 25 MG suppository Place 1 suppository (25 mg total) rectally every 6 (six) hours as needed for Nausea.     Family History       Problem Relation (Age of Onset)    No Known Problems Mother, Father          Tobacco Use    Smoking status: Never    Smokeless tobacco:  Never   Substance and Sexual Activity    Alcohol use: No    Drug use: No    Sexual activity: Yes     Partners: Female     Review of Systems: See HPI  Objective:     Vital Signs (Most Recent):  Temp: 97.8 °F (36.6 °C) (03/12/25 0434)  Pulse: 65 (03/12/25 0434)  Resp: 16 (03/12/25 0434)  BP: 110/66 (03/12/25 0434)  SpO2: 95 % (03/12/25 0434) Vital Signs (24h Range):  Temp:  [97.8 °F (36.6 °C)-98.8 °F (37.1 °C)] 97.8 °F (36.6 °C)  Pulse:  [65-92] 65  Resp:  [16-18] 16  SpO2:  [95 %-99 %] 95 %  BP: (101-126)/(66-76) 110/66     Weight: 76 kg (167 lb 8.8 oz)  Body mass index is 24.74 kg/m².     Physical Exam  Vitals reviewed.   Constitutional:       General: He is not in acute distress.     Appearance: He is ill-appearing. He is not toxic-appearing.   HENT:      Mouth/Throat:      Mouth: Mucous membranes are moist.   Cardiovascular:      Rate and Rhythm: Normal rate and regular rhythm.   Pulmonary:      Effort: Pulmonary effort is normal.      Breath sounds: Normal breath sounds.   Abdominal:      General: Bowel sounds are normal. There is no distension.      Palpations: Abdomen is soft.      Tenderness: There is no abdominal tenderness.   Genitourinary:     Comments: Astorga in place  Musculoskeletal:      Right lower leg: No edema.      Left lower leg: No edema.   Skin:     General: Skin is warm and dry.   Neurological:      Mental Status: He is alert.      Cranial Nerves: Cranial nerve deficit present.      Comments: Left-sided hemiparesis.  Left-sided facial weakness.  Impaired phonation; Speaks at a whisper.  Bed-bound.  Follows commands with right hand and foot.       Significant Labs personally reviewed: WBC 11.51, Hgb 10.3, BUN 30, Cr 1.7    Assessment/Plan:     * Dysphagia due to recent stroke  Multiple CVA  Urinary retention  NPO, continue IV fluids  SLP consulted  Astorga in place, strict I/O  Resume Plavix when appropriate  GI consulted for PEG tube evaluation unclear if pt has capacity this time.  Recommend  further discussion with family as well.  According to mother, daughter Antonieta ([phone]) is POA but unable to contact    Hypertension  On Norvasc 10 q.p.m. is lisinopril 40 daily per NH facility.  Holding now with BP low normal.    Diabetes mellitus, type 2  Last A1c 9.5 and 2022  Repeat A1c pending  NH says pt is on detemir 50 q.p.m..  Hold for now.  Continue sliding scale, titrate and add Lantus as needed    Diabetic gastroparesis  On Reglan per nursing home facility  Continue Protonix  PO changed to IV      VTE Risk Mitigation (From admission, onward)           Ordered     IP VTE HIGH RISK PATIENT  Once         03/11/25 2052     Place sequential compression device  Until discontinued         03/11/25 2052                  On 03/11/2025, patient should be placed in hospital observation services under my care.      Tim Langley III, MD  Department of Hospital Medicine  [facility]

## 2025-03-12 NOTE — SUBJECTIVE & OBJECTIVE
Past Medical History:   Diagnosis Date    CKD (chronic kidney disease)     Diabetes mellitus     Gastroparesis     Hypertension     Stroke     With left-sided weakness       Past Surgical History:   Procedure Laterality Date    BONE BIOPSY Right 9/15/2022    Procedure: BIOPSY, BONE;  Surgeon: Tatyana Siddiqui DPM;  Location: [facility] OR;  Service: Podiatry;  Laterality: Right;    CHOLECYSTECTOMY      DEBRIDEMENT OF FOOT Right 9/15/2022    Procedure: DEBRIDEMENT, FOOT;  Surgeon: Tatyana Siddiqui DPM;  Location: [facility] OR;  Service: Podiatry;  Laterality: Right;    INCISION AND DRAINAGE FOOT Right 9/15/2022    Procedure: INCISION AND DRAINAGE, FOOT;  Surgeon: Tatyana Siddiqui DPM;  Location: [facility] OR;  Service: Podiatry;  Laterality: Right;       Review of patient's allergies indicates:  No Known Allergies  Family History       Problem Relation (Age of Onset)    No Known Problems Mother, Father          Tobacco Use    Smoking status: Never    Smokeless tobacco: Never   Substance and Sexual Activity    Alcohol use: No    Drug use: No    Sexual activity: Yes     Partners: Female     Review of Systems   Respiratory:  Negative for shortness of breath.    Cardiovascular:  Negative for chest pain.   Gastrointestinal:  Negative for abdominal pain.     Objective:     Vital Signs (Most Recent):  Temp: 98.2 °F (36.8 °C) (03/12/25 0817)  Pulse: 75 (03/12/25 0817)  Resp: 18 (03/12/25 0817)  BP: 104/67 (03/12/25 0817)  SpO2: 98 % (03/12/25 0817) Vital Signs (24h Range):  Temp:  [97.8 °F (36.6 °C)-98.8 °F (37.1 °C)] 98.2 °F (36.8 °C)  Pulse:  [65-92] 75  Resp:  [16-18] 18  SpO2:  [95 %-99 %] 98 %  BP: (101-126)/(66-76) 104/67     Weight: 76 kg (167 lb 8.8 oz) (03/12/25 0215)  Body mass index is 24.74 kg/m².      Intake/Output Summary (Last 24 hours) at 3/12/2025 1036  Last data filed at 3/12/2025 0559  Gross per 24 hour   Intake 700 ml   Output 600 ml   Net 100 ml       Lines/Drains/Airways       Peripherally Inserted Central Catheter  "Line  Duration             PICC Double Lumen 09/19/22 1533 right basilic 904 days              Drain  Duration                  Urethral Catheter -- days              Peripheral Intravenous Line  Duration                  Peripheral IV - Single Lumen 03/11/25 1617 20 G Left Antecubital <1 day                     Physical Exam  Vitals and nursing note reviewed.   Constitutional:       General: He is not in acute distress.  HENT:      Head: Normocephalic and atraumatic.      Nose: Nose normal.   Cardiovascular:      Rate and Rhythm: Normal rate.   Pulmonary:      Effort: Pulmonary effort is normal. No respiratory distress.   Abdominal:      General: Abdomen is flat.      Palpations: Abdomen is soft. There is no mass.      Tenderness: There is no abdominal tenderness. There is no guarding or rebound.   Skin:     General: Skin is warm and dry.   Neurological:      Mental Status: He is disoriented.          Significant Labs:  CBC:   Recent Labs   Lab 03/11/25  1617   WBC 11.51   HGB 10.3*   HCT 32.8*        CMP:   Recent Labs   Lab 03/11/25  1617   *   CALCIUM 8.7   ALBUMIN 2.5*   PROT 7.1      K 4.5   CO2 23      BUN 30*   CREATININE 1.7*   ALKPHOS 98   ALT 14   AST 14   BILITOT 0.5     Coagulation: No results for input(s): "PT", "INR", "APTT" in the last 48 hours.    Significant Imaging:  Imaging results within the past 24 hours have been reviewed.  "

## 2025-03-12 NOTE — PLAN OF CARE
Problem: SLP  Goal: SLP Goal  Description: Speech Language Pathology Goals  Goals expected to be met by 3/19/25    1. Pt will participate in ongoing assessment of swallow function to determine safest, least restrictive means of nutrition/hydration  2. Educate Pt and family on aspiration precautions and SLP POC    Outcome: Progressing     SLP Bedside Swallow Evaluation initiated.  SLP unable to r/o aspiration at the bedside with low-level trials accepted. Pending access to recently completed MBSS at outside facility, Patient might benefit from repeat imaging via Modified Barium Swallow Study at this facility to further assess swallow function and options for PO nutrition should Patient not wish to have alternative means nutrition/hydration at this time. Findings/recommendations reviewed with Pt and MD.     3/12/2025

## 2025-03-12 NOTE — SUBJECTIVE & OBJECTIVE
Past Medical History:   Diagnosis Date    Diabetes mellitus     Gastroparesis     Hypertension     Stroke     With left-sided weakness       Past Surgical History:   Procedure Laterality Date    BONE BIOPSY Right 9/15/2022    Procedure: BIOPSY, BONE;  Surgeon: Tatyana Siddiqui DPM;  Location: [facility] OR;  Service: Podiatry;  Laterality: Right;    CHOLECYSTECTOMY      DEBRIDEMENT OF FOOT Right 9/15/2022    Procedure: DEBRIDEMENT, FOOT;  Surgeon: Tatyana Siddiqui DPM;  Location: [facility] OR;  Service: Podiatry;  Laterality: Right;    INCISION AND DRAINAGE FOOT Right 9/15/2022    Procedure: INCISION AND DRAINAGE, FOOT;  Surgeon: Tatyana Siddiqui DPM;  Location: [facility] OR;  Service: Podiatry;  Laterality: Right;       Review of patient's allergies indicates:  No Known Allergies    No current facility-administered medications on file prior to encounter.     Current Outpatient Medications on File Prior to Encounter   Medication Sig    amLODIPine (NORVASC) 10 MG tablet Take 10 mg by mouth once daily.    atorvastatin (LIPITOR) 20 MG tablet atorvastatin 20 mg tablet    clopidogreL (PLAVIX) 75 mg tablet clopidogrel 75 mg tablet    insulin aspart U-100 (NOVOLOG) 100 unit/mL (3 mL) InPn pen Inject 1-10 Units into the skin before meals and at bedtime as needed (Hyperglycemia).    insulin detemir U-100 (LEVEMIR FLEXTOUCH) 100 unit/mL (3 mL) SubQ InPn pen Inject 30 Units into the skin every evening.    ondansetron (ZOFRAN-ODT) 4 MG TbDL Take 1 tablet (4 mg total) by mouth every 8 (eight) hours as needed.    polyethylene glycol (GLYCOLAX) 17 gram PwPk Take 17 g by mouth daily as needed.    promethazine (PHENERGAN) 25 MG suppository Place 1 suppository (25 mg total) rectally every 6 (six) hours as needed for Nausea.     Family History       Problem Relation (Age of Onset)    No Known Problems Mother, Father          Tobacco Use    Smoking status: Never    Smokeless tobacco: Never   Substance and Sexual Activity    Alcohol use: No    Drug  use: No    Sexual activity: Yes     Partners: Female     Review of Systems: See HPI  Objective:     Vital Signs (Most Recent):  Temp: 97.8 °F (36.6 °C) (03/12/25 0434)  Pulse: 65 (03/12/25 0434)  Resp: 16 (03/12/25 0434)  BP: 110/66 (03/12/25 0434)  SpO2: 95 % (03/12/25 0434) Vital Signs (24h Range):  Temp:  [97.8 °F (36.6 °C)-98.8 °F (37.1 °C)] 97.8 °F (36.6 °C)  Pulse:  [65-92] 65  Resp:  [16-18] 16  SpO2:  [95 %-99 %] 95 %  BP: (101-126)/(66-76) 110/66     Weight: 76 kg (167 lb 8.8 oz)  Body mass index is 24.74 kg/m².     Physical Exam  Vitals reviewed.   Constitutional:       General: He is not in acute distress.     Appearance: He is ill-appearing. He is not toxic-appearing.   HENT:      Mouth/Throat:      Mouth: Mucous membranes are moist.   Cardiovascular:      Rate and Rhythm: Normal rate and regular rhythm.   Pulmonary:      Effort: Pulmonary effort is normal.      Breath sounds: Normal breath sounds.   Abdominal:      General: Bowel sounds are normal. There is no distension.      Palpations: Abdomen is soft.      Tenderness: There is no abdominal tenderness.   Genitourinary:     Comments: Astorga in place  Musculoskeletal:      Right lower leg: No edema.      Left lower leg: No edema.   Skin:     General: Skin is warm and dry.   Neurological:      Mental Status: He is alert.      Cranial Nerves: Cranial nerve deficit present.      Comments: Left-sided hemiparesis.  Left-sided facial weakness.  Impaired phonation; Speaks at a whisper.  Bed-bound.  Follows commands with right hand and foot.       Significant Labs personally reviewed: WBC 11.51, Hgb 10.3, BUN 30, Cr 1.7

## 2025-03-12 NOTE — HOSPITAL COURSE
Sent from NH for consideration of PEG tube placement in setting of poor oral intake after CVA.  Per NH, he failed outpatient barium swallow study.  Labs in the ED unremarkable.  Pt continues to state adamantly that he does not want a PEG tube and accepts the risks of aspiration. SLP evaluated and did imaging/scoping and are recommending minced/moist diet with nectar thick liquids at this time with aspiration precautions:  STRICT ASPIRATION PRECAUTIONS   SMALL consistent bites and sips  1 bite/sip at a time   Assistance with meals  Cup only - no straws    Avoid talking while eating   Eliminate distractions, Feed only when awake/alert  HOB to 90 degrees or as close as tolerated with head in neutral position, avoid hyperextension of neck and attempt to minimize left sided leaning   Meds crushed in puree  All liquids MUST be a Nectar thick consistency -  Pre-thickened liquids level 2 with a PINK triangle - MILDLY THICK  Can also thicken using thickener packets, 1 full pack to 6 oz of fluid   Encourage double swallows between bites and sips to clear residue   Pt may be interested in small single sips of un thickened liquids small single sips at a time via open cup in isolation in between meals for pleasure

## 2025-03-12 NOTE — PLAN OF CARE
03/12/25 1241   Post-Acute Status   Post-Acute Authorization Placement   Post-Acute Placement Status Referrals Sent     Pt is a resident of [facility] and is expected to return once he is medically stable. SW sent referral via [facility] for review.    Discharge Plan A and Plan B have been determined by review of patient's clinical status, future medical and therapeutic needs, and coverage/benefits for post-acute care in coordination with multidisciplinary team members.    AWILDA will continue to follow up.      Sabrina Aguero LMSW  [facility]  Ext. [phone]

## 2025-03-12 NOTE — ASSESSMENT & PLAN NOTE
Last A1c 9.5 and 2022  Repeat A1c pending  NH says pt is on detemir 50 q.p.m..  Hold for now.  Continue sliding scale, titrate and add Lantus as needed

## 2025-03-12 NOTE — PT/OT/SLP EVAL
Speech Language Pathology Evaluation  Bedside Swallow    Patient Name:  Henry Fernandez   MRN:  [id number]  Admitting Diagnosis: Dysphagia due to recent stroke    Recommendations:                 General Recommendations:  Dysphagia therapy and Modified barium swallow study  Diet recommendations:  NPO, NPO   Aspiration Precautions: Frequent oral care, Ice chips sparingly, and Strict aspiration precautions   General Precautions: Standard, aspiration, fall  Communication strategies:  provide increased time to answer and go to room if call light pushed    Assessment:     Henry Fernandez is a 54 y.o. male with an SLP diagnosis of Dysphagia and Dysphonia.  He presents from outside facility for consideration of PEG tube following recent Modified Barium Swallow Study (MBSS) from outside facility.  Patient interested in PO diet.  SLP unable to r/o aspiration at the bedside.  Pending access to outside MBSS report, Patient might warrant reassessment via MBSS at this facility to further assess swallow function and feasibility for PO advancement.      History:     Past Medical History:   Diagnosis Date    CKD (chronic kidney disease)     Diabetes mellitus     Gastroparesis     Hypertension     Stroke     With left-sided weakness       Past Surgical History:   Procedure Laterality Date    BONE BIOPSY Right 9/15/2022    Procedure: BIOPSY, BONE;  Surgeon: Tatyana Siddiqui DPM;  Location: [facility] OR;  Service: Podiatry;  Laterality: Right;    CHOLECYSTECTOMY      DEBRIDEMENT OF FOOT Right 9/15/2022    Procedure: DEBRIDEMENT, FOOT;  Surgeon: Tatyana Siddiqui DPM;  Location: [facility] OR;  Service: Podiatry;  Laterality: Right;    INCISION AND DRAINAGE FOOT Right 9/15/2022    Procedure: INCISION AND DRAINAGE, FOOT;  Surgeon: Tatyana Siddiqui DPM;  Location: [facility] OR;  Service: Podiatry;  Laterality: Right;       Social History: Patient presents from SNF    Prior Intubation HX:  none this admission     Modified Barium Swallow: Pt with recent MBSS at  "outside facility per chart, pending access to report     Chest X-Rays: none recent     Prior diet: unknown. Pt admitted for PEG placement following recently completed MBSS at outside facility.  Pt stated he has not eaten much for past few weeks. No family present.     Subjective     SLP Reviewed Pt with RN pre/post attempt  SLP reviewed Pt with MD pre/post attempts, MD explained Pt does not want PEG placement   Pt presents calm  He asks, "Am I going to die?"     Pain/Comfort:  Pain Rating 1: other (see comments) (did not rate)    Respiratory Status: Room air    Objective:     Oral Musculature Evaluation  Oral Musculature: general weakness, left weakness  Dentition: present and adequate  Secretion Management: adequate  Mucosal Quality: dry  Oral Labial Strength and Mobility: impaired retraction, impaired seal (L anterior loss of thin liquids)  Lingual Strength and Mobility: functional protrusion  Volitional Cough: present, reduced in strength  Volitional Swallow: elicited  Voice Prior to PO Intake: hoarse and breathy    Bedside Swallow Eval:   Consistencies Assessed:  Thin liquids : ice chipsx 3, tsp sipsx2, cup sipsx2       Oral Phase:   Anterior loss    Pharyngeal Phase:   delayed swallow initation  wet vocal quality after swallow  Delayed cough with eye watering following cup sips thin liquid    Compensatory Strategies  Volitional throat clear     Treatment: Pt found awake in bed with call light in reach. HOB elevated. He was alert and oriented to person and place via yes/no questions. When asked about prior dysphagia therapy and testing, Pt with minimal responses and decreased recall of events of prior ST recommendations prior to admission. Pt with overt S/S aspiration with low-level trials.  Pt was educated on dysphagia, definition and risk of aspiration, S/S aspiration and possibility of further repeat assessment via MBSS. Pt with minimal demonstration of understanding and did not verbalize understanding. He " asked questions about medical POC, questions deferred to MD.  He expressed interest in PO diet.  No additional questions. No family present. RN and MD notified and aware.     Goals:   Multidisciplinary Problems       SLP Goals          Problem: SLP    Goal Priority Disciplines Outcome   SLP Goal     SLP Progressing   Description: Speech Language Pathology Goals  Goals expected to be met by 3/19/25    1. Pt will participate in ongoing assessment of swallow function to determine safest, least restrictive means of nutrition/hydration  2. Educate Pt and family on aspiration precautions and SLP POC                         Plan:     Patient to be seen:  4 x/week   Plan of Care expires:  04/11/25  Plan of Care reviewed with:  patient   SLP Follow-Up:  Yes       Discharge recommendations:  Moderate Intensity Therapy (pending progress)   Barriers to Discharge:  NPO    Time Tracking:     SLP Treatment Date:   03/12/25  Speech Start Time:  1101  Speech Stop Time:  1120     Speech Total Time (min):  19 min    Billable Minutes: Eval Swallow and Oral Function 8 and Self Care/Home Management Training 8    03/12/2025

## 2025-03-12 NOTE — ASSESSMENT & PLAN NOTE
Multiple CVA  Urinary retention  NPO, continue IV fluids  SLP consulted  Astorga in place, strict I/O  Resume Plavix when appropriate  GI consulted for PEG tube evaluation unclear if pt has capacity this time.  Recommend further discussion with family as well.  According to mother, daughter Antonieta ([phone]) is POA but unable to contact

## 2025-03-12 NOTE — PLAN OF CARE
[facility]  Initial Discharge Assessment       Primary Care Provider: No Maher MD    Admission Diagnosis: Pre-operative clearance [Z01.818]  Dysphagia due to recent stroke [I69.391]    Admission Date: 3/11/2025  Expected Discharge Date: 3/13/2025         Payor: [employer] [facility] / Plan: [employer] CHOICES / Product Type: Medicare Advantage /     Extended Emergency Contact Information  Primary Emergency Contact: Cora Esqueda   [facility]  Home Phone: [phone]  Relation: Mother  Secondary Emergency Contact: Angela Ta   [place]  Mobile Phone: [phone]  Relation: Significant other    Discharge Plan A: (P) Return to nursing home  Discharge Plan B: (P) Return to Nursing Home    No Pharmacies Listed             SW completed Discharge Planning Assessment with nursing facility via telephone. Discharge planning booklet given to patient/family and whiteboard updated with ZECHARIAH and phone #. All questions answered.    Patient will need assistance with transportation upon discharge.     SW confirmed with facility that patient is a resident at [facility]. Patient is expected to return once he is medically stable. Prior to hospitalization patient needed assistance with his ADL's. Patient is not on dialysis and does not go to a Coumadin clinic.     SW attempted to contact patient's mother, Cora ([phone]); however, there was no answer and a message was left for her to return call. SW attempted to contact patient's significant other, Angela; however, the number on file is the wrong number. SW reviewed H&P, and patient's daughter, Antonieta ([phone]) is mentioned as his POA; however, when SW attempted to contact her there was no answer, and a message was left for her to return call.     Discharge Plan A and Plan B have been determined by review of patient's clinical status, future medical and therapeutic needs, and coverage/benefits for  post-acute care in coordination with multidisciplinary team members.      Sabrina Aguero LMSW  [facility]  Ext. [phone]

## 2025-03-12 NOTE — HPI
55 yo M, PMH DM2, HTN, HLD, CKD 3 (Cr baseline approx 1.3-1.6), gastroparesis with intermittent vomiting, multiple CVA with indwelling Astorga, residual left hemiparesis, left-sided facial muscle weakness, and impaired vocal phonation who presents to the ED for PEG tube evaluation following failed barium swallow.  Pt is difficult to understand and it is unclear if he has capacity to make his own medical decisions. When asked why he needed the PEG tube, pt was not sure.  When asked about urination, pt said that he did not have a Astorga when there was one in place with leg bag.  He seems to answer yes or no questions appropriately.  Denies any headache, chest pain, SOB, abdominal pain, nausea or vomiting.  He does verbalize that he would like to discuss PEG tube with family.  Contacted patient's mother who says that following 3rd stroke, pt's voice became very weak and he speaks at almost a whisper.  Mother says pt is unable to move his left side and confirms left-sided facial weakness.  Pt does have 2 adult daughters who are not in contact with the patient's mother.  Mother says that daughter Antonieta is POA at [phone] (called and straight to ).  Mother says that she thinks the pt would want to have the PEG tube placed.     Called nursing facility who confirmed full code on file.  Confirmed baseline mentation and residual stroke deficits.

## 2025-03-13 LAB
ANION GAP SERPL CALC-SCNC: 11 MMOL/L (ref 8–16)
BASOPHILS # BLD AUTO: 0.07 K/UL (ref 0–0.2)
BASOPHILS NFR BLD: 0.8 % (ref 0–1.9)
BUN SERPL-MCNC: 29 MG/DL (ref 6–20)
CALCIUM SERPL-MCNC: 8.4 MG/DL (ref 8.7–10.5)
CHLORIDE SERPL-SCNC: 112 MMOL/L (ref 95–110)
CO2 SERPL-SCNC: 18 MMOL/L (ref 23–29)
CREAT SERPL-MCNC: 1.2 MG/DL (ref 0.5–1.4)
DIFFERENTIAL METHOD BLD: ABNORMAL
EOSINOPHIL # BLD AUTO: 0.1 K/UL (ref 0–0.5)
EOSINOPHIL NFR BLD: 1.2 % (ref 0–8)
ERYTHROCYTE [DISTWIDTH] IN BLOOD BY AUTOMATED COUNT: 13.3 % (ref 11.5–14.5)
EST. GFR  (NO RACE VARIABLE): >60 ML/MIN/1.73 M^2
GLUCOSE SERPL-MCNC: 82 MG/DL (ref 70–110)
HCT VFR BLD AUTO: 34.1 % (ref 40–54)
HGB BLD-MCNC: 10.1 G/DL (ref 14–18)
IMM GRANULOCYTES # BLD AUTO: 0.04 K/UL (ref 0–0.04)
IMM GRANULOCYTES NFR BLD AUTO: 0.4 % (ref 0–0.5)
LYMPHOCYTES # BLD AUTO: 1.9 K/UL (ref 1–4.8)
LYMPHOCYTES NFR BLD: 20.8 % (ref 18–48)
MCH RBC QN AUTO: 29 PG (ref 27–31)
MCHC RBC AUTO-ENTMCNC: 29.6 G/DL (ref 32–36)
MCV RBC AUTO: 98 FL (ref 82–98)
MONOCYTES # BLD AUTO: 0.6 K/UL (ref 0.3–1)
MONOCYTES NFR BLD: 7.1 % (ref 4–15)
NEUTROPHILS # BLD AUTO: 6.3 K/UL (ref 1.8–7.7)
NEUTROPHILS NFR BLD: 69.7 % (ref 38–73)
NRBC BLD-RTO: 0 /100 WBC
PLATELET # BLD AUTO: 280 K/UL (ref 150–450)
PMV BLD AUTO: 9.6 FL (ref 9.2–12.9)
POCT GLUCOSE: 83 MG/DL (ref 70–110)
POCT GLUCOSE: 86 MG/DL (ref 70–110)
POCT GLUCOSE: 95 MG/DL (ref 70–110)
POTASSIUM SERPL-SCNC: 4.4 MMOL/L (ref 3.5–5.1)
RBC # BLD AUTO: 3.48 M/UL (ref 4.6–6.2)
SODIUM SERPL-SCNC: 141 MMOL/L (ref 136–145)
WBC # BLD AUTO: 9.04 K/UL (ref 3.9–12.7)

## 2025-03-13 PROCEDURE — 92612 ENDOSCOPY SWALLOW (FEES) VID: CPT

## 2025-03-13 PROCEDURE — G0378 HOSPITAL OBSERVATION PER HR: HCPCS

## 2025-03-13 PROCEDURE — 80048 BASIC METABOLIC PNL TOTAL CA: CPT | Performed by: HOSPITALIST

## 2025-03-13 PROCEDURE — 85025 COMPLETE CBC W/AUTO DIFF WBC: CPT | Performed by: HOSPITALIST

## 2025-03-13 PROCEDURE — 97535 SELF CARE MNGMENT TRAINING: CPT

## 2025-03-13 PROCEDURE — 96376 TX/PRO/DX INJ SAME DRUG ADON: CPT

## 2025-03-13 PROCEDURE — 25000003 PHARM REV CODE 250: Performed by: INTERNAL MEDICINE

## 2025-03-13 PROCEDURE — 25000003 PHARM REV CODE 250: Performed by: HOSPITALIST

## 2025-03-13 PROCEDURE — 63600175 PHARM REV CODE 636 W HCPCS: Performed by: FAMILY MEDICINE

## 2025-03-13 PROCEDURE — 36415 COLL VENOUS BLD VENIPUNCTURE: CPT | Performed by: HOSPITALIST

## 2025-03-13 RX ORDER — BACLOFEN 5 MG/1
5 TABLET ORAL 2 TIMES DAILY
Status: DISCONTINUED | OUTPATIENT
Start: 2025-03-13 | End: 2025-03-14 | Stop reason: HOSPADM

## 2025-03-13 RX ORDER — METOCLOPRAMIDE 5 MG/1
5 TABLET ORAL EVERY 6 HOURS
Status: DISCONTINUED | OUTPATIENT
Start: 2025-03-13 | End: 2025-03-14 | Stop reason: HOSPADM

## 2025-03-13 RX ORDER — FAMOTIDINE 40 MG/1
40 TABLET, FILM COATED ORAL NIGHTLY
Start: 2025-03-13 | End: 2026-03-13

## 2025-03-13 RX ORDER — MUPIROCIN 20 MG/G
OINTMENT TOPICAL 2 TIMES DAILY
Status: DISCONTINUED | OUTPATIENT
Start: 2025-03-13 | End: 2025-03-14 | Stop reason: HOSPADM

## 2025-03-13 RX ORDER — BACLOFEN 5 MG/1
5 TABLET ORAL 2 TIMES DAILY
Start: 2025-03-13 | End: 2026-03-13

## 2025-03-13 RX ORDER — METOCLOPRAMIDE 5 MG/1
5 TABLET ORAL EVERY 6 HOURS
Start: 2025-03-13

## 2025-03-13 RX ORDER — PANTOPRAZOLE SODIUM 40 MG/1
40 TABLET, DELAYED RELEASE ORAL DAILY
Start: 2025-03-13 | End: 2026-03-13

## 2025-03-13 RX ORDER — INSULIN GLARGINE 100 [IU]/ML
50 INJECTION, SOLUTION SUBCUTANEOUS DAILY
Start: 2025-03-13 | End: 2026-03-13

## 2025-03-13 RX ADMIN — PANTOPRAZOLE SODIUM 40 MG: 40 INJECTION, POWDER, LYOPHILIZED, FOR SOLUTION INTRAVENOUS at 08:03

## 2025-03-13 RX ADMIN — METOCLOPRAMIDE 5 MG: 5 INJECTION, SOLUTION INTRAMUSCULAR; INTRAVENOUS at 06:03

## 2025-03-13 RX ADMIN — MUPIROCIN: 20 OINTMENT TOPICAL at 10:03

## 2025-03-13 RX ADMIN — METOCLOPRAMIDE 5 MG: 5 INJECTION, SOLUTION INTRAMUSCULAR; INTRAVENOUS at 01:03

## 2025-03-13 RX ADMIN — METOCLOPRAMIDE 5 MG: 5 INJECTION, SOLUTION INTRAMUSCULAR; INTRAVENOUS at 12:03

## 2025-03-13 RX ADMIN — BACLOFEN 5 MG: 5 TABLET ORAL at 10:03

## 2025-03-13 RX ADMIN — METOCLOPRAMIDE 5 MG: 5 TABLET ORAL at 05:03

## 2025-03-13 NOTE — PLAN OF CARE
Problem: Adult Inpatient Plan of Care  Goal: Plan of Care Review  Outcome: Progressing  Goal: Patient-Specific Goal (Individualized)  Outcome: Progressing  Goal: Absence of Hospital-Acquired Illness or Injury  Outcome: Progressing  Goal: Optimal Comfort and Wellbeing  Outcome: Progressing  Goal: Readiness for Transition of Care  Outcome: Progressing       Pt AOx3. No acute events noted during shift. Vitals remained stable. Had c/o discomfort, scheduled meds given per MD orders. Q1-2hr safety checks completed. Bed remained low, locked, with all personal items in reach.

## 2025-03-13 NOTE — PROGRESS NOTES
[facility] [address] [place] [address] Medicine  Progress Note    Patient Name: Henry Fernandez  MRN: [id number]  Patient Class: OP- Observation   Admission Date: 3/11/2025  Length of Stay: 0 days  Attending Physician: Ekaterina Daley MD  Primary Care Provider: No Maher MD        Subjective     Principal Problem:Dysphagia due to recent stroke        HPI:  53 yo M, PMH DM2, HTN, HLD, CKD 3 (Cr baseline approx 1.3-1.6), gastroparesis with intermittent vomiting, multiple CVA with indwelling Astorga, residual left hemiparesis, left-sided facial muscle weakness, and impaired vocal phonation who presents to the ED for PEG tube evaluation following failed barium swallow.  Pt is difficult to understand and it is unclear if he has capacity to make his own medical decisions. When asked why he needed the PEG tube, pt was not sure.  When asked about urination, pt said that he did not have a Astorga when there was one in place with leg bag.  He seems to answer yes or no questions appropriately.  Denies any headache, chest pain, SOB, abdominal pain, nausea or vomiting.  He does verbalize that he would like to discuss PEG tube with family.  Contacted patient's mother who says that following 3rd stroke, pt's voice became very weak and he speaks at almost a whisper.  Mother says pt is unable to move his left side and confirms left-sided facial weakness.  Pt does have 2 adult daughters who are not in contact with the patient's mother.  Mother says that daughter Antonieta is POA at [phone] (called and straight to ).  Mother says that she thinks the pt would want to have the PEG tube placed.     Called nursing facility who confirmed full code on file.  Confirmed baseline mentation and residual stroke deficits.    Overview/Hospital Course:  Sent from NH for PEG tube placement in setting of poor oral intake after CVA.  Per NH, he failed outpatient barium swallow study.  Labs in the ED unremarkable.  Pt has been stating adamantly that he  does not want a PEG tube.  SLP evaluated and did imaging/scoping and are recommending regular diet at this time with aspiration precautions. They recommend continued monitoring of oral intake.     Interval History:  Did well on imaging/scoping study with SLP today, regular diet ordered. SLP would like to reevaluate his swallow tomorrow, notified nursing team to hold breakfast tray in the morning until SLP arrives.     Objective:     Vital Signs (Most Recent):  Temp: 97.5 °F (36.4 °C) (03/13/25 1553)  Pulse: 92 (03/13/25 1553)  Resp: 18 (03/13/25 1553)  BP: (!) 141/84 (03/13/25 1553)  SpO2: 96 % (03/13/25 1553) Vital Signs (24h Range):  Temp:  [97.5 °F (36.4 °C)-99.5 °F (37.5 °C)] 97.5 °F (36.4 °C)  Pulse:  [83-96] 92  Resp:  [16-18] 18  SpO2:  [93 %-98 %] 96 %  BP: (109-141)/(66-85) 141/84     Weight: 76 kg (167 lb 8.8 oz)  Body mass index is 24.74 kg/m².    Intake/Output Summary (Last 24 hours) at 3/13/2025 1609  Last data filed at 3/13/2025 1324  Gross per 24 hour   Intake 0 ml   Output 500 ml   Net -500 ml         Physical Exam  Vitals reviewed.   Constitutional:       General: He is not in acute distress.     Appearance: He is not toxic-appearing.   HENT:      Mouth/Throat:      Mouth: Mucous membranes are moist.   Cardiovascular:      Rate and Rhythm: Normal rate and regular rhythm.   Pulmonary:      Effort: Pulmonary effort is normal.      Breath sounds: Normal breath sounds.   Abdominal:      General: Bowel sounds are normal. There is no distension.      Palpations: Abdomen is soft.      Tenderness: There is no abdominal tenderness.   Genitourinary:     Comments: Astorga in place  Musculoskeletal:      Right lower leg: No edema.      Left lower leg: No edema.   Skin:     General: Skin is warm and dry.   Neurological:      Mental Status: He is alert.      Cranial Nerves: Cranial nerve deficit present.      Comments: Left-sided hemiparesis.  Left-sided facial weakness.  Impaired phonation; Speaks at a whisper.   Bed-bound.  Follows commands with right hand and foot.               Significant Labs: All pertinent labs within the past 24 hours have been reviewed.    Significant Imaging: I have reviewed all pertinent imaging results/findings within the past 24 hours.  Review of Systems   Constitutional:  Negative for chills, diaphoresis, fatigue and fever.   Neurological:  Positive for speech difficulty and weakness.   All other systems reviewed and are negative.        Assessment & Plan  Dysphagia due to recent stroke  Multiple CVA  Urinary retention    Received IV fluids  Astorga in place, strict I/O  Resume Plavix when appropriate  GI consulted but pt stated that he does not want a PEG tube  SLP performed imaging/scoping study and advanced to regular diet; they will continue monitoring his progress. Hold breakfast tray in the morning until SLP arrives.     Diabetic gastroparesis  On Reglan per nursing home facility  Continue Protonix    Diabetes mellitus, type 2  A1c 7.9  NH says pt is on detemir 50 q.p.m..  Hold for now.  Continue sliding scale, titrate and add Lantus as needed    Hypertension  On Norvasc 10 q.p.m., lisinopril 40 daily per NH facility.  Holding now with BP low normal.    VTE Risk Mitigation (From admission, onward)           Ordered     IP VTE HIGH RISK PATIENT  Once         03/11/25 2052     Place sequential compression device  Until discontinued         03/11/25 2052                    Discharge Planning   ZECHARIAH: 3/14/2025     Code Status: Full Code   Medical Readiness for Discharge Date:   Discharge Plan A: Return to nursing home                        Ekaterina Daley MD  Department of Hospital Medicine   [facility]

## 2025-03-13 NOTE — SUBJECTIVE & OBJECTIVE
Interval History:  Did well on imaging/scoping study with SLP today, regular diet ordered. SLP would like to reevaluate his swallow tomorrow, notified nursing team to hold breakfast tray in the morning until SLP arrives.     Objective:     Vital Signs (Most Recent):  Temp: 97.5 °F (36.4 °C) (03/13/25 1553)  Pulse: 92 (03/13/25 1553)  Resp: 18 (03/13/25 1553)  BP: (!) 141/84 (03/13/25 1553)  SpO2: 96 % (03/13/25 1553) Vital Signs (24h Range):  Temp:  [97.5 °F (36.4 °C)-99.5 °F (37.5 °C)] 97.5 °F (36.4 °C)  Pulse:  [83-96] 92  Resp:  [16-18] 18  SpO2:  [93 %-98 %] 96 %  BP: (109-141)/(66-85) 141/84     Weight: 76 kg (167 lb 8.8 oz)  Body mass index is 24.74 kg/m².    Intake/Output Summary (Last 24 hours) at 3/13/2025 1609  Last data filed at 3/13/2025 1324  Gross per 24 hour   Intake 0 ml   Output 500 ml   Net -500 ml         Physical Exam  Vitals reviewed.   Constitutional:       General: He is not in acute distress.     Appearance: He is not toxic-appearing.   HENT:      Mouth/Throat:      Mouth: Mucous membranes are moist.   Cardiovascular:      Rate and Rhythm: Normal rate and regular rhythm.   Pulmonary:      Effort: Pulmonary effort is normal.      Breath sounds: Normal breath sounds.   Abdominal:      General: Bowel sounds are normal. There is no distension.      Palpations: Abdomen is soft.      Tenderness: There is no abdominal tenderness.   Genitourinary:     Comments: Astorga in place  Musculoskeletal:      Right lower leg: No edema.      Left lower leg: No edema.   Skin:     General: Skin is warm and dry.   Neurological:      Mental Status: He is alert.      Cranial Nerves: Cranial nerve deficit present.      Comments: Left-sided hemiparesis.  Left-sided facial weakness.  Impaired phonation; Speaks at a whisper.  Bed-bound.  Follows commands with right hand and foot.               Significant Labs: All pertinent labs within the past 24 hours have been reviewed.    Significant Imaging: I have reviewed all  pertinent imaging results/findings within the past 24 hours.  Review of Systems   Constitutional:  Negative for chills, diaphoresis, fatigue and fever.   Neurological:  Positive for speech difficulty and weakness.   All other systems reviewed and are negative.

## 2025-03-13 NOTE — ASSESSMENT & PLAN NOTE
55 yo male with PMHx of T2DM, gastroparesis with intermittent vomiting, HTN, HLD, CKD 3, 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 Patient presents to [facility] ED 3/11 for PEG tube evaluation. Per chart review patient underwent an outpatient barium swallow 3/11 (results not in chart) and was subsequently sent to ED for PEG tube.    -SLP unable to rule out aspiration at the bedside with low-level trials accepted. Pending access to recently completed MBSS at outside facility, patient might benefit from repeat Modified Barium Swallow Study    -Per SLP note patient not wish to have alternative means nutrition/hydration at this time.  -Currently no physical evidence precludes PEG tube placement.  -Please reach out if patient/family elects to proceed with PEG placement following repeat MBS/receipt of outside results     Thank you for involving us in the care of Henry Fernandez. Please call with any additional questions, concerns or changes in the patient's clinical status. We will sign off.

## 2025-03-13 NOTE — ASSESSMENT & PLAN NOTE
Multiple CVA  Urinary retention    Received IV fluids  Astorga in place, strict I/O  Resume Plavix when appropriate  GI consulted but pt stated that he does not want a PEG tube  SLP performed imaging/scoping study and advanced to regular diet; they will continue monitoring his progress. Hold breakfast tray in the morning until SLP arrives.

## 2025-03-13 NOTE — PT/OT/SLP EVAL
Speech Language Pathology  [facility]  Fiberoptic Endoscopic Evaluation of Swallowing (FEES)    Patient Name:  Henry Fernandez   MRN:  [id number]    Impression:     The patient tolerated the procedure and the equipment was removed. Findings were consistent with the following swallow diagnoses following swallow diagnosis and severity: Functional oral and pharyngeal phases    Dysphagia is further characterized by intact swallow function without evidence of penetration or aspiration with all consistencies presented.      Recommendations:       General Recommendations: Dysphagia therapy  Diet Recommendations:  Regular Diet - IDDSI Level 7, Thin liquids - IDDSI Level 0   Medications: One at a time   Aspiration Precautions:   1 SINGLE SMALL bite/sip at a time  Eliminate distractions  Feed only when awake/alert  HOB to 90 degrees  General Precautions: Standard, aspiration, fall  Communication Strategies: provide increased time to answer and go to room if call light pushed    Referral:     Reason for Referral  Patient was referred for a Fiberoptic Endoscopic Evaluation of a Swallow (FEES) to assess the efficiency of swallow function, rule out aspiration and make recommendations regarding safe dietary consistencies, effective compensatory strategies, and safe eating environment. Please refer to initial assessments and H&P for detailed/pertinent past medical history.    Diagnosis: Dysphagia due to recent stroke     History:           Past Medical History:   Diagnosis Date    CKD (chronic kidney disease)     Diabetes mellitus     Gastroparesis     Hypertension     Stroke     With left-sided weakness       Subjective:     General Appearance:       Behavior/State: awake     Feeding tube present: No      Tracheostomy present: No     Respiratory Status: Room air    Pain: Pain Rating 1: 0/10    Additional Information: in bed    Objective:     Henry was seen today for a fiberoptic endoscopic evaluation of swallowing  (FEES). The patient was positioned upright in bed.    The patient's name and medical record number were verified via verbal consent and/or visualization of wristband. The purpose, procedure, and risks of FEES were explained to the patient. The patient expressed an understanding of potential risks and verbally consented to the procedure. Patient without known allergy to foods or synthetic food dyes offered during today's assessment.    Flexible Fiberoptic Endoscope was advanced transnasally through the most patent nasal cavity to the level of the velopharynx and larynx. A video of the examination was recorded.    Oral Mechanism Examination  Oral Musculature: general weakness, left weakness  Dentition: present and adequate  Secretion Management: adequate  Mucosal Quality: dry  Mandibular Strength and Mobility: WFL  Oral Labial Strength and Mobility: impaired seal  Lingual Strength and Mobility: functional anterior elevation, functional lateral movement  Volitional Cough: present, reduced in strength  Volitional Swallow: elicited  Voice Prior to PO Intake: hoarse and breathy with whispering during majority of spontaneous conversation; however, able to produce funciotnal phonaiton spontaneously throughout session (i.e. talking on phone); aphonia noted in connected speech though with persistent respiratory effort and coordination with voice onset, able to achieve adequate phonation for short bursts via single words and vegetative actions such as coughing    Scope Utilized:  Pentax 0194    Nares Entry: left    Visualization of Anatomy:     Velopharynx: Velopharyngeal closure noted to be complete but weak during speech tasks   Epiglottis: Intact   Arytenoids: Intact   True vocal folds: hypomobility appreciated to left true VF    Secretion Management: adequate   Glottis: incomplete glottic closure during speech tasks      L weakness/asymmetry        L true VF atrophy during adduction        Oral phase:  The oral phase  cannot be directly observed. The following behaviors are determined by the manner in which the bolus enters the pharynx.     WFL- Pt with adequate bolus acceptance, containment, control and timely A-P transfer across consistencies     Pharyngeal Phase:     Consistency Method/Volume Observation Compensatory Strategy   Thin Spoon, Cup, and Straw Essentially timely swallow initiation , No penetration observed , and No aspiration observed  N/A   Puree Spoon Essentially timely swallow initiation , No penetration observed , and No aspiration observed  N/A   Solid Clinician-regulated bites of yemi cracker  Essentially timely swallow initiation , No penetration observed , and No aspiration observed  N/A     During/Post-Procedure Respiratory Status: Room air    Prognosis:     Fair    Prognostic Barriers: medical diagnosis and cognitive status    Education:     Following assessment, SLP spoke with pt and pt's daughter (via phone) regarding FEES procedure, overall impressions, and findings of overall intact swallow function. Pt requesting water frequently throughout session and SLP offered pt with cup of thin liquids following termination of procedure; pt noted to take large, cyclical sips of thin liquids resulting in persistent coughing following presentation. SLP provided direct, concrete instruction regarding need for single small sips at a time though pt, again, regulating large open cup sips with continued evidence of productive coughing. Notable to mention, pt with increased cervical neck flexion during periods of self-presentation of cup sip trials following study with improved positioning noted during instrumental assessment. Pt's swallow function may be impacted by changes to positioning in conjunction with ability to consistently execute safe swallow strategies. SLP provided education regarding overall impressions, recommendations for regular diet with thin liquids, overt signs of airway compromise, safe swallow  strategies, and ongoing SLP POC. Pt and family verbalized understanding but would continue to benefit from ongoing education.     Spoke with MD regarding impressions and recommendations with emphasis on impact of pt's cognitive status, impulsivity, and positioning on performance with PO trials. Discussed that while ongoing assessment with full meal tray would be beneficial, pt has demonstrated a functional oropharyngeal swallow for support of an oral diet at this time. Discussed plans to return tomorrow AM to complete session with breakfast tray and continue to provide most appropriate diet consistency recommendations. MD verbalized understanding and was in agreement with plan.     Plan:     Speech service will continue to closely monitor  and Ongoing follow up warranted by speech service in post-acute setting upon discharge     Time Tracking:     SLP Treatment Date: 03/13/25  Speech Start Time: 1258  Speech Stop Time: 1342     Speech Total Time (min): 44 min    Billable Minutes: FEES Billable Minutes: Endoscopy Swallow Test 24 and Self Care/Home Management Training 25 03/13/2025

## 2025-03-13 NOTE — SUBJECTIVE & OBJECTIVE
Subjective:     Interval History: GI consulted for possible PEG placement. Patient refusing PEG at this time, wants to work with SLP and repeat the MBSS before making any decisions going forward.     Objective:     Vital Signs (Most Recent):  Temp: 98.2 °F (36.8 °C) (03/13/25 1107)  Pulse: 96 (03/13/25 1107)  Resp: 18 (03/13/25 0734)  BP: 109/66 (03/13/25 1107)  SpO2: (!) 93 % (03/13/25 1107) Vital Signs (24h Range):  Temp:  [97.5 °F (36.4 °C)-99.5 °F (37.5 °C)] 98.2 °F (36.8 °C)  Pulse:  [83-96] 96  Resp:  [16-18] 18  SpO2:  [93 %-98 %] 93 %  BP: (109-130)/(66-85) 109/66     Weight: 76 kg (167 lb 8.8 oz) (03/12/25 0215)  Body mass index is 24.74 kg/m².      Intake/Output Summary (Last 24 hours) at 3/13/2025 1445  Last data filed at 3/13/2025 1324  Gross per 24 hour   Intake 0 ml   Output 500 ml   Net -500 ml       Lines/Drains/Airways       Peripherally Inserted Central Catheter Line  Duration             PICC Double Lumen 09/19/22 1533 right basilic 905 days              Drain  Duration                  Urethral Catheter -- days              Peripheral Intravenous Line  Duration                  Peripheral IV - Single Lumen 03/11/25 1617 20 G Left Antecubital 1 day                     Physical Exam  Vitals and nursing note reviewed.   Constitutional:       General: He is not in acute distress.     Appearance: He is ill-appearing.   HENT:      Head: Normocephalic and atraumatic.   Eyes:      Extraocular Movements: Extraocular movements intact.   Cardiovascular:      Rate and Rhythm: Normal rate.   Pulmonary:      Effort: Pulmonary effort is normal. No respiratory distress.   Abdominal:      Tenderness: There is no abdominal tenderness. There is no guarding.   Musculoskeletal:      Cervical back: Normal range of motion.   Skin:     General: Skin is warm.   Neurological:      Mental Status: He is alert. Mental status is at baseline.          Significant Labs:  Recent Lab Results         03/13/25  1111    03/13/25  1100   03/13/25  0730   03/12/25 2047        Anion Gap   11           Baso #   0.07           Basophil %   0.8           BUN   29           Calcium   8.4           Chloride   112           CO2   18           Creatinine   1.2           Differential Method   Automated           eGFR   >60.0           Eos #   0.1           Eos %   1.2           Glucose   82           Gran # (ANC)   6.3           Gran %   69.7           Hematocrit   34.1           Hemoglobin   10.1           Immature Grans (Abs)   0.04  Comment: Mild elevation in immature granulocytes is non specific and   can be seen in a variety of conditions including stress response,   acute inflammation, trauma and pregnancy. Correlation with other   laboratory and clinical findings is essential.             Immature Granulocytes   0.4           Lymph #   1.9           Lymph %   20.8           MCH   29.0           MCHC   29.6           MCV   98           Mono #   0.6           Mono %   7.1           MPV   9.6           nRBC   0           Platelet Count   280           POCT Glucose 83     95   105       Potassium   4.4           RBC   3.48           RDW   13.3           Sodium   141           WBC   9.04                     Significant Imaging:  Imaging results within the past 24 hours have been reviewed.

## 2025-03-13 NOTE — PROGRESS NOTES
[facility]  Gastroenterology  Progress Note    Patient Name: Henry Fernandez  MRN: [id number]  Admission Date: 3/11/2025  Hospital Length of Stay: 0 days  Code Status: Full Code   Attending Provider: Ekaterina Daley MD  Consulting Provider: MICHELLE Cullen-C  Primary Care Physician: No Maher MD  Principal Problem: Dysphagia due to recent stroke        Subjective:     Interval History: GI consulted for possible PEG placement. Patient refusing PEG at this time, wants to work with SLP and repeat the MBSS before making any decisions going forward.     Objective:     Vital Signs (Most Recent):  Temp: 98.2 °F (36.8 °C) (03/13/25 1107)  Pulse: 96 (03/13/25 1107)  Resp: 18 (03/13/25 0734)  BP: 109/66 (03/13/25 1107)  SpO2: (!) 93 % (03/13/25 1107) Vital Signs (24h Range):  Temp:  [97.5 °F (36.4 °C)-99.5 °F (37.5 °C)] 98.2 °F (36.8 °C)  Pulse:  [83-96] 96  Resp:  [16-18] 18  SpO2:  [93 %-98 %] 93 %  BP: (109-130)/(66-85) 109/66     Weight: 76 kg (167 lb 8.8 oz) (03/12/25 0215)  Body mass index is 24.74 kg/m².      Intake/Output Summary (Last 24 hours) at 3/13/2025 1445  Last data filed at 3/13/2025 1324  Gross per 24 hour   Intake 0 ml   Output 500 ml   Net -500 ml       Lines/Drains/Airways       Peripherally Inserted Central Catheter Line  Duration             PICC Double Lumen 09/19/22 1533 right basilic 905 days              Drain  Duration                  Urethral Catheter -- days              Peripheral Intravenous Line  Duration                  Peripheral IV - Single Lumen 03/11/25 1617 20 G Left Antecubital 1 day                     Physical Exam  Vitals and nursing note reviewed.   Constitutional:       General: He is not in acute distress.     Appearance: He is ill-appearing.   HENT:      Head: Normocephalic and atraumatic.   Eyes:      Extraocular Movements: Extraocular movements intact.   Cardiovascular:      Rate and Rhythm: Normal rate.   Pulmonary:      Effort: Pulmonary effort is  normal. No respiratory distress.   Abdominal:      Tenderness: There is no abdominal tenderness. There is no guarding.   Musculoskeletal:      Cervical back: Normal range of motion.   Skin:     General: Skin is warm.   Neurological:      Mental Status: He is alert. Mental status is at baseline.          Significant Labs:  Recent Lab Results         03/13/25  1111   03/13/25  1100   03/13/25  0730   03/12/25  2047        Anion Gap   11           Baso #   0.07           Basophil %   0.8           BUN   29           Calcium   8.4           Chloride   112           CO2   18           Creatinine   1.2           Differential Method   Automated           eGFR   >60.0           Eos #   0.1           Eos %   1.2           Glucose   82           Gran # (ANC)   6.3           Gran %   69.7           Hematocrit   34.1           Hemoglobin   10.1           Immature Grans (Abs)   0.04  Comment: Mild elevation in immature granulocytes is non specific and   can be seen in a variety of conditions including stress response,   acute inflammation, trauma and pregnancy. Correlation with other   laboratory and clinical findings is essential.             Immature Granulocytes   0.4           Lymph #   1.9           Lymph %   20.8           MCH   29.0           MCHC   29.6           MCV   98           Mono #   0.6           Mono %   7.1           MPV   9.6           nRBC   0           Platelet Count   280           POCT Glucose 83     95   105       Potassium   4.4           RBC   3.48           RDW   13.3           Sodium   141           WBC   9.04                     Significant Imaging:  Imaging results within the past 24 hours have been reviewed.  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 Patient presents to [facility] ED 3/11 for PEG tube evaluation. Per chart review  patient underwent an outpatient barium swallow 3/11 (results not in chart) and was subsequently sent to ED for PEG tube.    -SLP unable to rule out aspiration at the bedside with low-level trials accepted. Pending access to recently completed MBSS at outside facility, patient might benefit from repeat Modified Barium Swallow Study    -Per SLP note patient not wish to have alternative means nutrition/hydration at this time.  -Currently no physical evidence precludes PEG tube placement.  -Please reach out if patient/family elects to proceed with PEG placement following repeat MBS/receipt of outside results     Thank you for involving us in the care of Henry Fernandez. Please call with any additional questions, concerns or changes in the patient's clinical status. We will sign off.         Case discussed with Dr Avila   Thank you for your consult. I will sign off. Please contact us if you have any additional questions.    Mickie Fay, ALINEP-C  Gastroenterology  [facility]

## 2025-03-13 NOTE — ASSESSMENT & PLAN NOTE
A1c 7.9  NH says pt is on detemir 50 q.p.m..  Hold for now.  Continue sliding scale, titrate and add Lantus as needed

## 2025-03-14 VITALS
HEART RATE: 82 BPM | DIASTOLIC BLOOD PRESSURE: 87 MMHG | TEMPERATURE: 98 F | RESPIRATION RATE: 18 BRPM | OXYGEN SATURATION: 99 % | BODY MASS INDEX: 24.82 KG/M2 | SYSTOLIC BLOOD PRESSURE: 137 MMHG | HEIGHT: 69 IN | WEIGHT: 167.56 LBS

## 2025-03-14 LAB
POCT GLUCOSE: 113 MG/DL (ref 70–110)
POCT GLUCOSE: 126 MG/DL (ref 70–110)
POCT GLUCOSE: 127 MG/DL (ref 70–110)
POCT GLUCOSE: 172 MG/DL (ref 70–110)
POCT GLUCOSE: 186 MG/DL (ref 70–110)

## 2025-03-14 PROCEDURE — 92526 ORAL FUNCTION THERAPY: CPT

## 2025-03-14 PROCEDURE — 97535 SELF CARE MNGMENT TRAINING: CPT

## 2025-03-14 PROCEDURE — 96376 TX/PRO/DX INJ SAME DRUG ADON: CPT

## 2025-03-14 PROCEDURE — G0378 HOSPITAL OBSERVATION PER HR: HCPCS

## 2025-03-14 PROCEDURE — 25000003 PHARM REV CODE 250: Performed by: INTERNAL MEDICINE

## 2025-03-14 PROCEDURE — 25000003 PHARM REV CODE 250: Performed by: HOSPITALIST

## 2025-03-14 PROCEDURE — 63600175 PHARM REV CODE 636 W HCPCS: Performed by: FAMILY MEDICINE

## 2025-03-14 RX ADMIN — BACLOFEN 5 MG: 5 TABLET ORAL at 08:03

## 2025-03-14 RX ADMIN — MUPIROCIN: 20 OINTMENT TOPICAL at 08:03

## 2025-03-14 RX ADMIN — PANTOPRAZOLE SODIUM 40 MG: 40 INJECTION, POWDER, LYOPHILIZED, FOR SOLUTION INTRAVENOUS at 08:03

## 2025-03-14 RX ADMIN — METOCLOPRAMIDE 5 MG: 5 TABLET ORAL at 12:03

## 2025-03-14 NOTE — DISCHARGE SUMMARY
[facility] [address] [place] [address] Medicine  Discharge Summary      Patient Name: Henry Fernandez  MRN: [id number]  NIRAJ: [id number]  Patient Class: OP- Observation  Admission Date: 3/11/2025  Hospital Length of Stay: 3 days  Discharge Date and Time:  03/14/2025 1:19 PM  Attending Physician: Ekaterina Daley MD   Discharging Provider: Ekaterina Daley MD  Primary Care Provider: No Maher MD  [facility] Medicine Team: [facility] Ekaterina Daley MD  Primary Care Team: [facility]    HPI:   53 yo M, PMH DM2, HTN, HLD, CKD 3 (Cr baseline approx 1.3-1.6), gastroparesis with intermittent vomiting, multiple CVA with indwelling Astorga, residual left hemiparesis, left-sided facial muscle weakness, and impaired vocal phonation who presents to the ED for PEG tube evaluation following failed barium swallow.  Pt is difficult to understand and it is unclear if he has capacity to make his own medical decisions. When asked why he needed the PEG tube, pt was not sure.  When asked about urination, pt said that he did not have a Astorga when there was one in place with leg bag.  He seems to answer yes or no questions appropriately.  Denies any headache, chest pain, SOB, abdominal pain, nausea or vomiting.  He does verbalize that he would like to discuss PEG tube with family.  Contacted patient's mother who says that following 3rd stroke, pt's voice became very weak and he speaks at almost a whisper.  Mother says pt is unable to move his left side and confirms left-sided facial weakness.  Pt does have 2 adult daughters who are not in contact with the patient's mother.  Mother says that daughter Antonieta is POA at [phone] (called and straight to ).  Mother says that she thinks the pt would want to have the PEG tube placed.     Called nursing facility who confirmed full code on file.  Confirmed baseline mentation and residual stroke deficits.    * No surgery found *      Hospital Course:   Sent from NH for consideration of PEG tube  placement in setting of poor oral intake after CVA.  Per NH, he failed outpatient barium swallow study.  Labs in the ED unremarkable.  Pt continues to state adamantly that he does not want a PEG tube and accepts the risks of aspiration. SLP evaluated and did imaging/scoping and are recommending minced/moist diet with nectar thick liquids at this time with aspiration precautions:  STRICT ASPIRATION PRECAUTIONS   SMALL consistent bites and sips  1 bite/sip at a time   Assistance with meals  Cup only - no straws    Avoid talking while eating   Eliminate distractions, Feed only when awake/alert  HOB to 90 degrees or as close as tolerated with head in neutral position, avoid hyperextension of neck and attempt to minimize left sided leaning   Meds crushed in puree  All liquids MUST be a Nectar thick consistency -  Pre-thickened liquids level 2 with a PINK triangle - MILDLY THICK  Can also thicken using thickener packets, 1 full pack to 6 oz of fluid   Encourage double swallows between bites and sips to clear residue   Pt may be interested in small single sips of un thickened liquids small single sips at a time via open cup in isolation in between meals for pleasure        Goals of Care Treatment Preferences:  Code Status: Full Code         Consults:   Consults (From admission, onward)          Status Ordering Provider     Inpatient consult to Gastroenterology  Once        Provider:  (Not yet assigned)    Completed JEROME FREEMAN            Assessment & Plan  Dysphagia due to recent stroke  Multiple CVA  Urinary retention    Received IV fluids  Astorga in place, strict I/O  GI consulted but pt reiterated multiple times to many providers that he does not want a PEG tube  SLP performed imaging/scoping study and advanced to minced/moist diet with nectar thick liquids with STRICT aspiration precautions    Diabetic gastroparesis  On Reglan per nursing home facility  Continue Protonix    Diabetes mellitus, type 2  A1c  7.9  Resume home regimen    Hypertension  Resume home meds    Final Active Diagnoses:    Diagnosis Date Noted POA    PRINCIPAL PROBLEM:  Dysphagia due to recent stroke [I69.391] 03/11/2025 Not Applicable    Diabetes mellitus, type 2 [E11.9]  Yes    Diabetic gastroparesis [E11.43, K31.84]  Yes    Hypertension [I10] 12/26/2014 Yes      Problems Resolved During this Admission:       Discharged Condition: stable    [facility]    Follow Up:    Patient Instructions:   No discharge procedures on file.    Significant Diagnostic Studies: N/A    Pending Diagnostic Studies:       None           Medications:  Reconciled Home Medications:      Medication List        CONTINUE taking these medications      amLODIPine 10 MG tablet  Commonly known as: NORVASC  Take 10 mg by mouth once daily.     baclofen 5 mg Tab tablet  Commonly known as: LIORESAL  Take 1 tablet (5 mg total) by mouth 2 (two) times daily.     famotidine 40 MG tablet  Commonly known as: PEPCID  Take 1 tablet (40 mg total) by mouth every evening.     LANTUS SOLOSTAR U-100 INSULIN 100 unit/mL (3 mL) Inpn pen  Generic drug: insulin glargine U-100 (Lantus)  Inject 50 Units into the skin once daily.     metoclopramide HCl 5 MG tablet  Commonly known as: REGLAN  Take 1 tablet (5 mg total) by mouth every 6 (six) hours.     pantoprazole 40 MG tablet  Commonly known as: PROTONIX  Take 1 tablet (40 mg total) by mouth once daily.              Indwelling Lines/Drains at time of discharge:   Lines/Drains/Airways       Peripherally Inserted Central Catheter Line  Duration             PICC Double Lumen 09/19/22 1533 right basilic 906 days              Drain  Duration                  Urethral Catheter -- days                    Time spent on the discharge of patient: 45 minutes of time spent on discharge, including examining the patient, providing discharge instructions, arranging follow up, and documentation.            Ekaterina Daley MD  Department of  [facility] Medicine  [facility]

## 2025-03-14 NOTE — ASSESSMENT & PLAN NOTE
Multiple CVA  Urinary retention    Received IV fluids  Astorga in place, strict I/O  GI consulted but pt reiterated multiple times to many providers that he does not want a PEG tube  SLP performed imaging/scoping study and advanced to minced/moist diet with nectar thick liquids with STRICT aspiration precautions

## 2025-03-14 NOTE — PLAN OF CARE
03/14/25 1425   Post-Acute Status   Post-Acute Authorization Placement   Post-Acute Placement Status Set-up Complete/Auth obtained     Pt is discharging to [facility] located at [address] [place] [address]. Pt is going to [facility]. RN Vilma can call report to the [address] [place] [address] nurse at [phone].    SW arranged stretcher transport via [facility]. Requested  time is 3:30PM. Requested  time does not guarantee arrival time.      Sabrina Aguero LMSW  [facility]  Ext. [phone]

## 2025-03-14 NOTE — PT/OT/SLP PROGRESS
Speech Language Pathology Treatment    Patient Name:  Henry Fernandez   MRN:  [id number]  Admitting Diagnosis: Dysphagia due to recent stroke    Recommendations:                 General Recommendations:  Dysphagia therapy  Diet recommendations:  Minced & Moist Diet - IDDSI Level 5, Liquid Diet Level: Mildly thick/Nectar thick liquids - IDDSI Level 2   Aspiration Precautions:   STRICT ASPIRATION PRECAUTIONS   SMALL consistent bites and sips  1 bite/sip at a time   Assistance with meals  Cup only - no straws    Avoid talking while eating   Eliminate distractions, Feed only when awake/alert  HOB to 90 degrees or as close as tolerated with head in neutral position, avoid hyperextension of neck and attempt to minimize left sided leaning   Meds crushed in puree  All liquids MUST be a Nectar thick consistency -  Pre-thickened liquids level 2 with a PINK triangle - MILDLY THICK  Can also thicken using thickener packets, 1 full pack to 6 oz of fluid   Encourage double swallows between bites and sips to clear residue   Pt may be interested in small single sips of un thickened liquids small single sips at a time via open cup in isolation in between meals for pleasure     General Precautions: Standard, aspiration, fall  Communication strategies:  none    Assessment:     Henry Fernandez is a 54 y.o. male with an SLP diagnosis of Dysphagia, Cognitive-Linguistic Impairment, and Dysphonia.     Subjective     Pt awake and alert     Pain/Comfort:  Pain Rating 1: 0/10  Pain Rating Post-Intervention 1: 0/10    Respiratory Status: Room air    Objective:     Has the patient been evaluated by SLP for swallowing?   Yes  Keep patient NPO? No   Current Respiratory Status:        Pt seen for ongoing dysphagia treatment. Despite results from previous dates objective swallow assessment (FEES -revealing adequate airway protection with thin liquids and regular solids), Pt continues to exhibit clinical signs of aspiration with frequent coughing during  independent feeding opportunities. SLP explaining to pt swallow study is just a moment in time and given impulsivity, positioning preferences (reclined), underlying left sided weakness he remains high aspiration risk. Pt nodding in agreement and continues to report disinterest in supplemental feeding tube.  During PO trials this date pt demonstrated ability to manage single sips of thin liquid via straw and open cup x1 each in isolation without concern for airway compromise and no coughing appreciated. As food and drink begin to become more cyclic in nature as part of a larger meal pt consistently coughing on thin liquids and regular solids. With SLP modifications to a pre-chopped soft solid and cup sips of nectar thickened liquids (across 3oz) coughing is eliminated. At this time,  given Pt wishes to continue with PO intake, information from FEES, clinical presentation and pre-morbid deficits it appears a diet of minced and moist solids with nectar thickened liquids and crushed meds is the least restrictive.  Additional modifications and strategies listed in recommendations above. SLP spoke directly with MD regarding recs and ongoing speech therapy follow up in subacute setting warranted.    Goals:   Multidisciplinary Problems       SLP Goals          Problem: SLP    Goal Priority Disciplines Outcome   SLP Goal     SLP Progressing   Description: Speech Language Pathology Goals  Goals expected to be met by 3/19/25    1. Pt will participate in ongoing assessment of swallow function to determine safest, least restrictive means of nutrition/hydration  2. Educate Pt and family on aspiration precautions and SLP POC                         Plan:     Patient to be seen:  4 x/week   Plan of Care expires:  04/11/25  Plan of Care reviewed with:  patient   SLP Follow-Up:  Yes       Discharge recommendations:  Moderate Intensity Therapy   Barriers to Discharge:  Level of Skilled Assistance Needed   and Safety Awareness       Time Tracking:     SLP Treatment Date:   03/14/25  Speech Start Time:  0838  Speech Stop Time:  0904     Speech Total Time (min):  26 min    Billable Minutes: Treatment Swallowing Dysfunction 10 and Self Care/Home Management Training 16    03/14/2025

## 2025-03-14 NOTE — PLAN OF CARE
NURSING HOME ORDERS    03/14/2025  [facility]  [facility]  [address]  [facility] [address]  Dept: [phone]  Loc: [phone]     Admit to Nursing Home:  snf Nursing Facili*    Diagnoses:  Active Hospital Problems    Diagnosis  POA    *Dysphagia due to recent stroke [I69.391]  Not Applicable    Diabetes mellitus, type 2 [E11.9]  Yes    Diabetic gastroparesis [E11.43, K31.84]  Yes    Hypertension [I10]  Yes      Resolved Hospital Problems   No resolved problems to display.       Patient is homebound due to:  Dysphagia due to recent stroke    Allergies:Review of patient's allergies indicates:  No Known Allergies    Vitals :  Routine    Diet: minced and moist diet with nectar thick liquids only with strict aspiration precautions:  STRICT ASPIRATION PRECAUTIONS   SMALL consistent bites and sips  1 bite/sip at a time   Assistance with meals  Cup only - no straws    Avoid talking while eating   Eliminate distractions, Feed only when awake/alert  HOB to 90 degrees or as close as tolerated with head in neutral position, avoid hyperextension of neck and attempt to minimize left sided leaning   Meds crushed in puree  All liquids MUST be a Nectar thick consistency -  Pre-thickened liquids level 2 with a PINK triangle - MILDLY THICK  Can also thicken using thickener packets, 1 full pack to 6 oz of fluid   Encourage double swallows between bites and sips to clear residue   Pt may be interested in small single sips of un thickened liquids small single sips at a time via open cup in isolation in between meals for pleasure     Activities:   Activity as tolerated    Goals of Care Treatment Preferences:  Code Status: Full Code        Nursing Precautions:  Aspiration , Fall, and Pressure ulcer prevention    Consults:   ST to evaluate and treat- 3 times a week (or per nursing facility)    Miscellaneous Care: Routine Skin for Bedridden Patients:  Apply moisture barrier cream to  all  Diabetes Care: Diabetes: Check blood sugar. Fingerstick blood sugar AC and HS  Sliding Scale/Hypoglycemia Protocol: For FSG<80, give 1 amp D50 or 1 glucose tablet. For FSG , do nothing. For -200, give 1 unit of novolog in addition to pre-meal insulin. For -250, give 2 units of novolog in addition to pre-meal insulin. For -300, give 3 units of novolog in addition to pre-meal insulin. For 301-350, give 4 units of novolog in addition to pre-meal insulin. For 351-400, give 5 units of novolog in addition to pre-meal insulin. For FSG >400, give 5 units of novolog in addition to pre-meal insulin and please call MD                   Diabetes Care:  SN to perform and educate Diabetic management with blood glucose monitoring:, Fingerstick blood sugar AC and HS, and Report CBG < 60 or > 350 to physician.      Medications: Discontinue all previous medication orders, if any. See new list below.     Medication List        CONTINUE taking these medications      amLODIPine 10 MG tablet  Commonly known as: NORVASC  Take 10 mg by mouth once daily.     baclofen 5 mg Tab tablet  Commonly known as: LIORESAL  Take 1 tablet (5 mg total) by mouth 2 (two) times daily.     famotidine 40 MG tablet  Commonly known as: PEPCID  Take 1 tablet (40 mg total) by mouth every evening.     LANTUS SOLOSTAR U-100 INSULIN 100 unit/mL (3 mL) Inpn pen  Generic drug: insulin glargine U-100 (Lantus)  Inject 50 Units into the skin once daily.     metoclopramide HCl 5 MG tablet  Commonly known as: REGLAN  Take 1 tablet (5 mg total) by mouth every 6 (six) hours.     pantoprazole 40 MG tablet  Commonly known as: PROTONIX  Take 1 tablet (40 mg total) by mouth once daily.                  _________________________________  Ekaterina Daley MD  03/14/2025

## 2025-03-15 NOTE — NURSING
Patient cleaned / left via stretcher with transport personal . Patient belongings/ cell phone placed in a bag in patients lap under the cover.   
Received admit from er via stretcher 55 y/o b/m with dx of dysphagia due to recent stroke. Aaox2. No distress noted no complaints. Will continue to monitor.  
Report called to the facility and given to  Katie FORBES. Awaiting transportation to pick patient up. VSS and NAD at this time.    
Patient

## 2025-03-17 NOTE — PLAN OF CARE
[facility]  Discharge Final Note    Primary Care Provider: No Maher MD    Expected Discharge Date: 3/14/2025    Patient discharged to [facility] via ambulance transportation.     Patient's bedside nurse and patient notified of the above.    Discharge Plan A and Plan B have been determined by review of patient's clinical status, future medical and therapeutic needs, and coverage/benefits for post-acute care in coordination with multidisciplinary team members.        Final Discharge Note (most recent)       Final Note - 03/17/25 0942          Final Note    Assessment Type Final Discharge Note (P)      Anticipated Discharge Disposition [facility] (P)         Post-Acute Status    Post-Acute Authorization Placement (P)      Post-Acute Placement Status Set-up Complete/Auth obtained (P)                      Important Message from Medicare                 No future appointments.    SW scheduled post-discharge follow-up appointment and information added to AVS.     Sabrina Aguero LMSW  [facility]  Ext. [phone]

## (undated) DEVICE — CONTAINER SPECIMEN OR STER 4OZ

## (undated) DEVICE — SOL 9P NACL IRR PIC IL

## (undated) DEVICE — DRAPE SURG W/TWL 17 5/8X23

## (undated) DEVICE — MANIFOLD 4 PORT

## (undated) DEVICE — GLOVE 8 PROTEXIS PI ORTHO

## (undated) DEVICE — NDL SAFETY 25G X 1.5 ECLIPSE

## (undated) DEVICE — HEADREST ROUND DISP FOAM 9IN

## (undated) DEVICE — PAD CAST SPECIALIST STRL 4

## (undated) DEVICE — APPLICATOR CHLORAPREP ORN 26ML

## (undated) DEVICE — COVER LIGHT HANDLE 80/CA

## (undated) DEVICE — NDL HYPODERMIC BLUNT 18G 1.5IN

## (undated) DEVICE — BLADE SURG CARBON STEEL #10

## (undated) DEVICE — SPONGE DERMACEA GAUZE 4X4

## (undated) DEVICE — GAUZE SPONGE 4X4 12PLY

## (undated) DEVICE — BANDAGE ESMARK ELASTIC ST 4X9

## (undated) DEVICE — STOCKINET TUBULAR 1 PLY 6X60IN

## (undated) DEVICE — GOWN POLY REINF BRTH SLV XL

## (undated) DEVICE — NDL BONE MAR JAMSHIDI 11G 4CM

## (undated) DEVICE — GLOVE SURG BIOGEL LATEX SZ 7.5

## (undated) DEVICE — ELECTRODE REM PLYHSV RETURN 9

## (undated) DEVICE — BLADE SURG #15 CARBON STEEL

## (undated) DEVICE — SYR 3CC LUER LOC

## (undated) DEVICE — DRAPE THREE-QUARTER 53X77IN

## (undated) DEVICE — BLADE SCALP OPHTL RND TIP

## (undated) DEVICE — SUPPORT ULNA NERVE PROTECTOR

## (undated) DEVICE — BANDAGE MATRIX HK LOOP 4IN 5YD

## (undated) DEVICE — DRAPE T EXTRM SURG 121X128X90

## (undated) DEVICE — PACK BASIC SETUP SC BR

## (undated) DEVICE — DRESSING N ADH OIL EMUL 3X3

## (undated) DEVICE — DECANTER 6 VIAL

## (undated) DEVICE — SUT 4-0 ETHILON 18 PS-2

## (undated) DEVICE — TOWEL OR DISP STRL BLUE 4/PK

## (undated) DEVICE — SYR 10CC LUER LOCK